# Patient Record
Sex: FEMALE | Race: WHITE | NOT HISPANIC OR LATINO | Employment: OTHER | URBAN - METROPOLITAN AREA
[De-identification: names, ages, dates, MRNs, and addresses within clinical notes are randomized per-mention and may not be internally consistent; named-entity substitution may affect disease eponyms.]

---

## 2023-05-15 ENCOUNTER — TELEPHONE (OUTPATIENT)
Dept: PRIMARY CARE | Facility: CLINIC | Age: 78
End: 2023-05-15

## 2023-06-28 PROBLEM — M25.511 CHRONIC RIGHT SHOULDER PAIN: Status: ACTIVE | Noted: 2023-06-28

## 2023-06-28 PROBLEM — E03.8 HYPOTHYROIDISM DUE TO HASHIMOTO'S THYROIDITIS: Status: ACTIVE | Noted: 2023-06-28

## 2023-06-28 PROBLEM — S46.001S OSTEOARTHRITIS OF RIGHT SHOULDER DUE TO ROTATOR CUFF INJURY: Status: ACTIVE | Noted: 2023-06-28

## 2023-06-28 PROBLEM — M67.819 TENDINOSIS OF ROTATOR CUFF: Status: ACTIVE | Noted: 2023-06-28

## 2023-06-28 PROBLEM — M54.6 DORSALGIA OF THORACIC REGION: Status: ACTIVE | Noted: 2023-06-28

## 2023-06-28 PROBLEM — M25.572 ACUTE BILATERAL ANKLE PAIN: Status: ACTIVE | Noted: 2023-06-28

## 2023-06-28 PROBLEM — K63.5 BENIGN COLONIC POLYP: Status: ACTIVE | Noted: 2023-06-28

## 2023-06-28 PROBLEM — G47.62: Status: ACTIVE | Noted: 2023-06-28

## 2023-06-28 PROBLEM — L80 VITILIGO: Status: ACTIVE | Noted: 2023-06-28

## 2023-06-28 PROBLEM — E55.9 VITAMIN D DEFICIENCY: Status: ACTIVE | Noted: 2023-06-28

## 2023-06-28 PROBLEM — N81.10 FEMALE BLADDER PROLAPSE: Status: ACTIVE | Noted: 2023-06-28

## 2023-06-28 PROBLEM — M48.062 SPINAL STENOSIS OF LUMBAR REGION WITH NEUROGENIC CLAUDICATION: Status: ACTIVE | Noted: 2023-06-28

## 2023-06-28 PROBLEM — G89.29 CHRONIC RIGHT SHOULDER PAIN: Status: ACTIVE | Noted: 2023-06-28

## 2023-06-28 PROBLEM — M25.561 RIGHT KNEE PAIN: Status: ACTIVE | Noted: 2023-06-28

## 2023-06-28 PROBLEM — I65.23 BILATERAL CAROTID ARTERY STENOSIS: Status: ACTIVE | Noted: 2023-06-28

## 2023-06-28 PROBLEM — R10.2 PELVIC PAIN IN FEMALE: Status: ACTIVE | Noted: 2023-06-28

## 2023-06-28 PROBLEM — M70.61 GREATER TROCHANTERIC BURSITIS OF RIGHT HIP: Status: ACTIVE | Noted: 2023-06-28

## 2023-06-28 PROBLEM — R26.81 GAIT INSTABILITY: Status: ACTIVE | Noted: 2023-06-28

## 2023-06-28 PROBLEM — M85.80 OSTEOPENIA: Status: ACTIVE | Noted: 2023-06-28

## 2023-06-28 PROBLEM — M19.111 OSTEOARTHRITIS OF RIGHT SHOULDER DUE TO ROTATOR CUFF INJURY: Status: ACTIVE | Noted: 2023-06-28

## 2023-06-28 PROBLEM — E53.8 VITAMIN B12 DEFICIENCY: Status: ACTIVE | Noted: 2023-06-28

## 2023-06-28 PROBLEM — E06.3 HYPOTHYROIDISM DUE TO HASHIMOTO'S THYROIDITIS: Status: ACTIVE | Noted: 2023-06-28

## 2023-06-28 PROBLEM — J44.89 COPD (CHRONIC OBSTRUCTIVE PULMONARY DISEASE) WITH CHRONIC BRONCHITIS (MULTI): Status: ACTIVE | Noted: 2023-06-28

## 2023-06-28 PROBLEM — I87.2 CHRONIC VENOUS INSUFFICIENCY OF LOWER EXTREMITY: Status: ACTIVE | Noted: 2023-06-28

## 2023-06-28 PROBLEM — N32.81 OAB (OVERACTIVE BLADDER): Status: ACTIVE | Noted: 2023-06-28

## 2023-06-28 PROBLEM — J30.89 PERENNIAL ALLERGIC RHINITIS WITH SEASONAL VARIATION: Status: ACTIVE | Noted: 2023-06-28

## 2023-06-28 PROBLEM — M53.9 MULTILEVEL DEGENERATIVE DISC DISEASE: Status: ACTIVE | Noted: 2023-06-28

## 2023-06-28 PROBLEM — J30.2 PERENNIAL ALLERGIC RHINITIS WITH SEASONAL VARIATION: Status: ACTIVE | Noted: 2023-06-28

## 2023-06-28 PROBLEM — M25.571 ACUTE BILATERAL ANKLE PAIN: Status: ACTIVE | Noted: 2023-06-28

## 2023-06-28 PROBLEM — M99.09 SEGMENTAL AND SOMATIC DYSFUNCTION: Status: ACTIVE | Noted: 2023-06-28

## 2023-06-28 PROBLEM — M12.811 ROTATOR CUFF ARTHROPATHY OF RIGHT SHOULDER: Status: ACTIVE | Noted: 2023-06-28

## 2023-06-28 PROBLEM — R73.02 IGT (IMPAIRED GLUCOSE TOLERANCE): Status: ACTIVE | Noted: 2023-06-28

## 2023-06-28 PROBLEM — M47.814 THORACIC SPONDYLOSIS: Status: ACTIVE | Noted: 2023-06-28

## 2023-06-28 PROBLEM — D89.89: Status: ACTIVE | Noted: 2023-06-28

## 2023-06-28 PROBLEM — R53.83 FATIGUE: Status: ACTIVE | Noted: 2023-06-28

## 2023-06-28 PROBLEM — M15.9 GENERALIZED OSTEOARTHRITIS OF MULTIPLE SITES: Status: ACTIVE | Noted: 2023-06-28

## 2023-06-28 PROBLEM — G93.32: Status: ACTIVE | Noted: 2023-06-28

## 2023-06-28 RX ORDER — CHOLECALCIFEROL (VITAMIN D3) 50 MCG
50 TABLET ORAL DAILY
COMMUNITY

## 2023-06-28 RX ORDER — ALBUTEROL SULFATE 90 UG/1
2 AEROSOL, METERED RESPIRATORY (INHALATION) EVERY 6 HOURS PRN
COMMUNITY
Start: 2020-06-03

## 2023-06-28 RX ORDER — ESTRADIOL 0.1 MG/G
2 CREAM VAGINAL
COMMUNITY
Start: 2023-03-08

## 2023-06-28 RX ORDER — CYANOCOBALAMIN 1000 UG/ML
1000 INJECTION, SOLUTION INTRAMUSCULAR; SUBCUTANEOUS
COMMUNITY
Start: 2022-09-19

## 2023-06-28 RX ORDER — LEVOTHYROXINE SODIUM 100 UG/1
100 TABLET ORAL DAILY
COMMUNITY
End: 2023-08-02 | Stop reason: ALTCHOICE

## 2023-06-28 RX ORDER — IBUPROFEN 200 MG
1 TABLET ORAL DAILY
COMMUNITY
Start: 2022-04-05

## 2023-06-28 RX ORDER — BUDESONIDE AND FORMOTEROL FUMARATE DIHYDRATE 160; 4.5 UG/1; UG/1
2 AEROSOL RESPIRATORY (INHALATION) 2 TIMES DAILY
COMMUNITY
Start: 2022-05-20 | End: 2023-10-31 | Stop reason: SDUPTHER

## 2023-07-05 ENCOUNTER — OFFICE VISIT (OUTPATIENT)
Dept: PRIMARY CARE | Facility: CLINIC | Age: 78
End: 2023-07-05
Payer: MEDICARE

## 2023-07-05 VITALS
HEART RATE: 78 BPM | OXYGEN SATURATION: 96 % | SYSTOLIC BLOOD PRESSURE: 118 MMHG | RESPIRATION RATE: 16 BRPM | HEIGHT: 68 IN | DIASTOLIC BLOOD PRESSURE: 70 MMHG | WEIGHT: 198 LBS | BODY MASS INDEX: 30.01 KG/M2

## 2023-07-05 DIAGNOSIS — E53.8 VITAMIN B12 DEFICIENCY: ICD-10-CM

## 2023-07-05 DIAGNOSIS — D89.89 CFIDS (CHRONIC FATIGUE AND IMMUNE DYSFUNCTION SYNDROME) (MULTI): ICD-10-CM

## 2023-07-05 DIAGNOSIS — I65.23 CAROTID STENOSIS, ASYMPTOMATIC, BILATERAL: ICD-10-CM

## 2023-07-05 DIAGNOSIS — I65.23 BILATERAL CAROTID ARTERY STENOSIS: ICD-10-CM

## 2023-07-05 DIAGNOSIS — J44.89 COPD (CHRONIC OBSTRUCTIVE PULMONARY DISEASE) WITH CHRONIC BRONCHITIS (MULTI): ICD-10-CM

## 2023-07-05 DIAGNOSIS — R73.02 IGT (IMPAIRED GLUCOSE TOLERANCE): ICD-10-CM

## 2023-07-05 DIAGNOSIS — E03.8 HYPOTHYROIDISM DUE TO HASHIMOTO'S THYROIDITIS: ICD-10-CM

## 2023-07-05 DIAGNOSIS — E06.3 HYPOTHYROIDISM DUE TO HASHIMOTO'S THYROIDITIS: ICD-10-CM

## 2023-07-05 DIAGNOSIS — G93.32 CFIDS (CHRONIC FATIGUE AND IMMUNE DYSFUNCTION SYNDROME) (MULTI): ICD-10-CM

## 2023-07-05 DIAGNOSIS — G43.801 OCULAR MIGRAINE WITH STATUS MIGRAINOSUS: Primary | ICD-10-CM

## 2023-07-05 DIAGNOSIS — M70.61 GREATER TROCHANTERIC BURSITIS OF RIGHT HIP: ICD-10-CM

## 2023-07-05 DIAGNOSIS — E55.9 VITAMIN D DEFICIENCY: ICD-10-CM

## 2023-07-05 DIAGNOSIS — E78.2 MIXED HYPERLIPIDEMIA: ICD-10-CM

## 2023-07-05 PROBLEM — G89.29 CHRONIC RIGHT SHOULDER PAIN: Status: RESOLVED | Noted: 2023-06-28 | Resolved: 2023-07-05

## 2023-07-05 PROBLEM — R26.81 GAIT INSTABILITY: Status: RESOLVED | Noted: 2023-06-28 | Resolved: 2023-07-05

## 2023-07-05 PROBLEM — M67.819 TENDINOSIS OF ROTATOR CUFF: Status: RESOLVED | Noted: 2023-06-28 | Resolved: 2023-07-05

## 2023-07-05 PROBLEM — M99.09 SEGMENTAL AND SOMATIC DYSFUNCTION: Status: RESOLVED | Noted: 2023-06-28 | Resolved: 2023-07-05

## 2023-07-05 PROBLEM — M25.571 ACUTE BILATERAL ANKLE PAIN: Status: RESOLVED | Noted: 2023-06-28 | Resolved: 2023-07-05

## 2023-07-05 PROBLEM — M25.572 ACUTE BILATERAL ANKLE PAIN: Status: RESOLVED | Noted: 2023-06-28 | Resolved: 2023-07-05

## 2023-07-05 PROBLEM — G47.62: Status: RESOLVED | Noted: 2023-06-28 | Resolved: 2023-07-05

## 2023-07-05 PROBLEM — M25.511 CHRONIC RIGHT SHOULDER PAIN: Status: RESOLVED | Noted: 2023-06-28 | Resolved: 2023-07-05

## 2023-07-05 PROBLEM — M12.811 ROTATOR CUFF ARTHROPATHY OF RIGHT SHOULDER: Status: RESOLVED | Noted: 2023-06-28 | Resolved: 2023-07-05

## 2023-07-05 PROBLEM — R10.2 PELVIC PAIN IN FEMALE: Status: RESOLVED | Noted: 2023-06-28 | Resolved: 2023-07-05

## 2023-07-05 PROBLEM — M25.561 RIGHT KNEE PAIN: Status: RESOLVED | Noted: 2023-06-28 | Resolved: 2023-07-05

## 2023-07-05 PROBLEM — R53.83 FATIGUE: Status: RESOLVED | Noted: 2023-06-28 | Resolved: 2023-07-05

## 2023-07-05 PROBLEM — M47.814 THORACIC SPONDYLOSIS: Status: RESOLVED | Noted: 2023-06-28 | Resolved: 2023-07-05

## 2023-07-05 PROCEDURE — 1160F RVW MEDS BY RX/DR IN RCRD: CPT | Performed by: INTERNAL MEDICINE

## 2023-07-05 PROCEDURE — 1036F TOBACCO NON-USER: CPT | Performed by: INTERNAL MEDICINE

## 2023-07-05 PROCEDURE — 20610 DRAIN/INJ JOINT/BURSA W/O US: CPT | Performed by: INTERNAL MEDICINE

## 2023-07-05 PROCEDURE — 1159F MED LIST DOCD IN RCRD: CPT | Performed by: INTERNAL MEDICINE

## 2023-07-05 PROCEDURE — 96372 THER/PROPH/DIAG INJ SC/IM: CPT | Performed by: INTERNAL MEDICINE

## 2023-07-05 PROCEDURE — 99214 OFFICE O/P EST MOD 30 MIN: CPT | Performed by: INTERNAL MEDICINE

## 2023-07-05 RX ORDER — CYANOCOBALAMIN 1000 UG/ML
1000 INJECTION, SOLUTION INTRAMUSCULAR; SUBCUTANEOUS ONCE
Status: COMPLETED | OUTPATIENT
Start: 2023-07-05 | End: 2023-07-05

## 2023-07-05 RX ORDER — TRIAMCINOLONE ACETONIDE 40 MG/ML
40 INJECTION, SUSPENSION INTRA-ARTICULAR; INTRAMUSCULAR
Status: COMPLETED | OUTPATIENT
Start: 2023-07-05 | End: 2023-07-05

## 2023-07-05 RX ORDER — LIDOCAINE HYDROCHLORIDE 10 MG/ML
2 INJECTION INFILTRATION; PERINEURAL
Status: COMPLETED | OUTPATIENT
Start: 2023-07-05 | End: 2023-07-05

## 2023-07-05 RX ADMIN — TRIAMCINOLONE ACETONIDE 40 MG: 40 INJECTION, SUSPENSION INTRA-ARTICULAR; INTRAMUSCULAR at 18:59

## 2023-07-05 RX ADMIN — CYANOCOBALAMIN 1000 MCG: 1000 INJECTION, SOLUTION INTRAMUSCULAR; SUBCUTANEOUS at 17:53

## 2023-07-05 RX ADMIN — LIDOCAINE HYDROCHLORIDE 2 ML: 10 INJECTION INFILTRATION; PERINEURAL at 18:59

## 2023-07-05 ASSESSMENT — ENCOUNTER SYMPTOMS
MYALGIAS: 1
LOSS OF SENSATION IN FEET: 0
OCCASIONAL FEELINGS OF UNSTEADINESS: 0
FATIGUE: 1
BACK PAIN: 1
ARTHRALGIAS: 1
HEADACHES: 1
DEPRESSION: 0

## 2023-07-05 ASSESSMENT — ANXIETY QUESTIONNAIRES
2. NOT BEING ABLE TO STOP OR CONTROL WORRYING: NOT AT ALL
5. BEING SO RESTLESS THAT IT IS HARD TO SIT STILL: NOT AT ALL
1. FEELING NERVOUS, ANXIOUS, OR ON EDGE: NOT AT ALL
4. TROUBLE RELAXING: NOT AT ALL
IF YOU CHECKED OFF ANY PROBLEMS ON THIS QUESTIONNAIRE, HOW DIFFICULT HAVE THESE PROBLEMS MADE IT FOR YOU TO DO YOUR WORK, TAKE CARE OF THINGS AT HOME, OR GET ALONG WITH OTHER PEOPLE: NOT DIFFICULT AT ALL
GAD7 TOTAL SCORE: 0
6. BECOMING EASILY ANNOYED OR IRRITABLE: NOT AT ALL
3. WORRYING TOO MUCH ABOUT DIFFERENT THINGS: NOT AT ALL
7. FEELING AFRAID AS IF SOMETHING AWFUL MIGHT HAPPEN: NOT AT ALL

## 2023-07-05 ASSESSMENT — PATIENT HEALTH QUESTIONNAIRE - PHQ9
2. FEELING DOWN, DEPRESSED OR HOPELESS: NOT AT ALL
SUM OF ALL RESPONSES TO PHQ9 QUESTIONS 1 AND 2: 0
1. LITTLE INTEREST OR PLEASURE IN DOING THINGS: NOT AT ALL

## 2023-07-05 ASSESSMENT — COLUMBIA-SUICIDE SEVERITY RATING SCALE - C-SSRS
1. IN THE PAST MONTH, HAVE YOU WISHED YOU WERE DEAD OR WISHED YOU COULD GO TO SLEEP AND NOT WAKE UP?: NO
2. HAVE YOU ACTUALLY HAD ANY THOUGHTS OF KILLING YOURSELF?: NO
6. HAVE YOU EVER DONE ANYTHING, STARTED TO DO ANYTHING, OR PREPARED TO DO ANYTHING TO END YOUR LIFE?: NO

## 2023-07-05 NOTE — PROGRESS NOTES
Subjective   Reason for Visit: Jie Crocker is an 78 y.o. female here for a fu  visit.     Past Medical, Surgical, and Family History reviewed and updated in chart.    Reviewed all medications by prescribing practitioner or clinical pharmacist (such as prescriptions, OTCs, herbal therapies and supplements) and documented in the medical record.    Presented to the office today history of vitreous detachment of her eyes bilaterally in the past has been experiencing increase in amount of floaters in addition to visual phenomenon followed by a chronic mild headache she was evaluated by her ophthalmologist who diagnosed ocular migraine there was no evidence of embolic disease but felt that she should have a vascular work-up.  Patient denies acute loss of vision diplopia she denies severe migraine headache she denies jaw claudication or temporal pain he denies any shoulder or neck pain does have chronic rotator cuff tear pathology of her right shoulder from previous falls no TIA or stroke phenomenon noted patient notes chronic back pain particularly in her thoracic spine at this time.  Patient being cared for by chiropractic physician with mild benefit patient also experiencing recurrent greater trochanteric bursitis of the right hip and would like a hip injection which she responded to well with her previous visit has follow-up appointment in 1 month for regular examination with her history of Hashimoto thyroiditis and vitiligo with like to repeat autoimmune work-up        Patient Care Team:  Magdaleno Nielson DO as PCP - General  Magdaleno Nielson DO as PCP - [facility] [place] [place]     Review of Systems   Constitutional:  Positive for fatigue.   Eyes:  Positive for visual disturbance.   Musculoskeletal:  Positive for arthralgias, back pain and myalgias.   Neurological:  Positive for headaches.   Patient ID: Jie Crocker is a 78 y.o. female.    Joint Injection Large/Arthrocentesis: R greater trochanteric  "bursa on 7/5/2023 6:59 PM  Indications: pain  Details: lateral approach  Medications: 40 mg triamcinolone acetonide 40 mg/mL; 2 mL lidocaine 10 mg/mL (1 %)  Procedure, treatment alternatives, risks and benefits explained, specific risks discussed. Immediately prior to procedure a time out was called to verify the correct patient, procedure, equipment, support staff and site/side marked as required. Patient was prepped and draped in the usual sterile fashion.           Objective   Vitals:  /70   Pulse 78   Resp 16   Ht 1.715 m (5' 7.5\")   Wt 89.8 kg (198 lb)   SpO2 96%   BMI 30.55 kg/m²       Physical Exam  Vitals and nursing note reviewed.   Constitutional:       General: She is not in acute distress.     Appearance: Normal appearance. She is well-developed. She is not toxic-appearing.   HENT:      Head: Normocephalic and atraumatic.      Right Ear: Tympanic membrane and external ear normal.      Left Ear: Tympanic membrane and external ear normal.      Nose: Nose normal.      Mouth/Throat:      Mouth: Mucous membranes are moist.      Pharynx: Oropharynx is clear. No oropharyngeal exudate or posterior oropharyngeal erythema.      Tonsils: No tonsillar exudate. 2+ on the right. 2+ on the left.   Eyes:      Extraocular Movements: Extraocular movements intact.      Conjunctiva/sclera: Conjunctivae normal.   Cardiovascular:      Rate and Rhythm: Normal rate and regular rhythm.      Pulses: Normal pulses.      Heart sounds: Normal heart sounds. No murmur heard.  Pulmonary:      Effort: Pulmonary effort is normal.      Breath sounds: Normal breath sounds.   Abdominal:      General: Abdomen is flat. Bowel sounds are normal.      Palpations: Abdomen is soft.   Musculoskeletal:      Cervical back: Neck supple.   Lymphadenopathy:      Cervical: No cervical adenopathy.   Skin:     General: Skin is warm and dry.      Findings: No rash.   Neurological:      Mental Status: She is alert. Mental status is at baseline. "   Psychiatric:         Mood and Affect: Mood normal.         Behavior: Behavior normal.         Thought Content: Thought content normal.         Judgment: Judgment normal.         Assessment/Plan   Problem List Items Addressed This Visit       Bilateral carotid artery stenosis    Current Assessment & Plan     Reevaluate carotid Dopplers in the setting of visual disturbances and ocular migraine         COPD (chronic obstructive pulmonary disease) with chronic bronchitis (CMS/[facility])    Current Assessment & Plan     Stable on bronchodilator therapy at this time         CFIDS (chronic fatigue and immune dysfunction syndrome) (CMS/[facility])    Current Assessment & Plan     Reevaluate autoimmune profile         Relevant Orders    Sedimentation Rate    MERRY with Reflex to EDITH    Rheumatoid factor    Tsh With Reflex To Free T4 If Abnormal    Comprehensive metabolic panel    CBC and Auto Differential    Vitamin D 25-Hydroxy,Total    Vitamin B12    High sensitivity CRP    Vascular US carotid artery duplex bilateral    Generalized osteoarthritis of multiple sites    Greater trochanteric bursitis of right hip    Current Assessment & Plan     Given bursal hip injection today with good results no complications reevaluate next visit         Relevant Orders    Sedimentation Rate    MERRY with Reflex to EDITH    Rheumatoid factor    Tsh With Reflex To Free T4 If Abnormal    Comprehensive metabolic panel    CBC and Auto Differential    Vitamin D 25-Hydroxy,Total    Vitamin B12    High sensitivity CRP    Vascular US carotid artery duplex bilateral    Hyperlipidemia    Current Assessment & Plan     Reevaluate cardiometabolic profile in the setting of vascular risk         Relevant Orders    Sedimentation Rate    MERRY with Reflex to EDITH    Rheumatoid factor    Tsh With Reflex To Free T4 If Abnormal    Comprehensive metabolic panel    CBC and Auto Differential    Vitamin D 25-Hydroxy,Total    Vitamin B12    High sensitivity CRP    Vascular US carotid  artery duplex bilateral    Hypothyroidism due to Hashimoto's thyroiditis    Current Assessment & Plan     Reevaluate thyroid functions continue levothyroxine 100 mcg daily clinically euthyroid in January         Relevant Orders    Sedimentation Rate    MERRY with Reflex to EDITH    Rheumatoid factor    Tsh With Reflex To Free T4 If Abnormal    Comprehensive metabolic panel    CBC and Auto Differential    Vitamin D 25-Hydroxy,Total    Vitamin B12    High sensitivity CRP    Vascular US carotid artery duplex bilateral    IGT (impaired glucose tolerance)    Current Assessment & Plan     Reevaluate fasting blood sugar and hemoglobin A1c as a relates to fatigue and polymyalgia         Vitamin B12 deficiency    Current Assessment & Plan     Repeat vitamin B12 levels for history of vitamin B12 deficiency         Relevant Medications    cyanocobalamin (Vitamin B-12) injection 1,000 mcg (Completed)    Vitamin D deficiency    Current Assessment & Plan     Repeat vitamin D level with vitamin D supplementation         Relevant Orders    Sedimentation Rate    MERRY with Reflex to EDITH    Rheumatoid factor    Tsh With Reflex To Free T4 If Abnormal    Comprehensive metabolic panel    CBC and Auto Differential    Vitamin D 25-Hydroxy,Total    Vitamin B12    High sensitivity CRP    Vascular US carotid artery duplex bilateral    Ocular migraine with status migrainosus    Overview     Patient have more frequent episodes of starburst followed by headache may be related to rebound headache from NSAID overuse for treatment of osteoarthritis multifactorial history of bilateral vitreous detachment no evidence of retinal detachment continue to monitor closely neurological exam is nonfocal and does not suggest intracranial pathology May consider CT scan of head if symptoms continue or acutely worsen we will check sed rate and CRP levels to evaluate for possibility of temporal arteritis based on age and history of autoimmune condition with  polymyalgia rheumatica          Other Visit Diagnoses       Ocular migraine    -  Primary    Relevant Orders    Sedimentation Rate    MERRY with Reflex to EDITH    Rheumatoid factor    Tsh With Reflex To Free T4 If Abnormal    Comprehensive metabolic panel    CBC and Auto Differential    Vitamin D 25-Hydroxy,Total    Vitamin B12    High sensitivity CRP    Vascular US carotid artery duplex bilateral    Carotid stenosis, asymptomatic, bilateral        Relevant Orders    Vascular US carotid artery duplex bilateral

## 2023-07-24 ENCOUNTER — LAB (OUTPATIENT)
Dept: LAB | Facility: LAB | Age: 78
End: 2023-07-24
Payer: MEDICARE

## 2023-07-24 DIAGNOSIS — M70.61 GREATER TROCHANTERIC BURSITIS OF RIGHT HIP: ICD-10-CM

## 2023-07-24 DIAGNOSIS — D89.89 CFIDS (CHRONIC FATIGUE AND IMMUNE DYSFUNCTION SYNDROME) (MULTI): ICD-10-CM

## 2023-07-24 DIAGNOSIS — E03.8 HYPOTHYROIDISM DUE TO HASHIMOTO'S THYROIDITIS: ICD-10-CM

## 2023-07-24 DIAGNOSIS — E78.2 MIXED HYPERLIPIDEMIA: ICD-10-CM

## 2023-07-24 DIAGNOSIS — E55.9 VITAMIN D DEFICIENCY: ICD-10-CM

## 2023-07-24 DIAGNOSIS — E06.3 HYPOTHYROIDISM DUE TO HASHIMOTO'S THYROIDITIS: ICD-10-CM

## 2023-07-24 DIAGNOSIS — G93.32 CFIDS (CHRONIC FATIGUE AND IMMUNE DYSFUNCTION SYNDROME) (MULTI): ICD-10-CM

## 2023-07-24 LAB
ALANINE AMINOTRANSFERASE (SGPT) (U/L) IN SER/PLAS: 21 U/L (ref 7–45)
ALBUMIN (G/DL) IN SER/PLAS: 3.9 G/DL (ref 3.4–5)
ALKALINE PHOSPHATASE (U/L) IN SER/PLAS: 75 U/L (ref 33–136)
ANION GAP IN SER/PLAS: 9 MMOL/L (ref 10–20)
ASPARTATE AMINOTRANSFERASE (SGOT) (U/L) IN SER/PLAS: 19 U/L (ref 9–39)
BASOPHILS (10*3/UL) IN BLOOD BY AUTOMATED COUNT: 0.07 X10E9/L (ref 0–0.1)
BASOPHILS/100 LEUKOCYTES IN BLOOD BY AUTOMATED COUNT: 1.1 % (ref 0–2)
BILIRUBIN TOTAL (MG/DL) IN SER/PLAS: 0.5 MG/DL (ref 0–1.2)
C REACTIVE PROTEIN (MG/L) IN SER/PLAS BY HIGH SENSIT: 3.1 MG/L
CALCIDIOL (25 OH VITAMIN D3) (NG/ML) IN SER/PLAS: 46 NG/ML
CALCIUM (MG/DL) IN SER/PLAS: 9.2 MG/DL (ref 8.6–10.3)
CARBON DIOXIDE, TOTAL (MMOL/L) IN SER/PLAS: 26 MMOL/L (ref 21–32)
CHLORIDE (MMOL/L) IN SER/PLAS: 109 MMOL/L (ref 98–107)
COBALAMIN (VITAMIN B12) (PG/ML) IN SER/PLAS: 1060 PG/ML (ref 211–911)
CREATININE (MG/DL) IN SER/PLAS: 0.77 MG/DL (ref 0.5–1.05)
EOSINOPHILS (10*3/UL) IN BLOOD BY AUTOMATED COUNT: 0.21 X10E9/L (ref 0–0.4)
EOSINOPHILS/100 LEUKOCYTES IN BLOOD BY AUTOMATED COUNT: 3.4 % (ref 0–6)
ERYTHROCYTE DISTRIBUTION WIDTH (RATIO) BY AUTOMATED COUNT: 12.8 % (ref 11.5–14.5)
ERYTHROCYTE MEAN CORPUSCULAR HEMOGLOBIN CONCENTRATION (G/DL) BY AUTOMATED: 31.8 G/DL (ref 32–36)
ERYTHROCYTE MEAN CORPUSCULAR VOLUME (FL) BY AUTOMATED COUNT: 91 FL (ref 80–100)
ERYTHROCYTES (10*6/UL) IN BLOOD BY AUTOMATED COUNT: 4.9 X10E12/L (ref 4–5.2)
GFR FEMALE: 80 ML/MIN/1.73M2
GLUCOSE (MG/DL) IN SER/PLAS: 81 MG/DL (ref 74–99)
HEMATOCRIT (%) IN BLOOD BY AUTOMATED COUNT: 44.4 % (ref 36–46)
HEMOGLOBIN (G/DL) IN BLOOD: 14.1 G/DL (ref 12–16)
IMMATURE GRANULOCYTES/100 LEUKOCYTES IN BLOOD BY AUTOMATED COUNT: 0.2 % (ref 0–0.9)
LEUKOCYTES (10*3/UL) IN BLOOD BY AUTOMATED COUNT: 6.1 X10E9/L (ref 4.4–11.3)
LYMPHOCYTES (10*3/UL) IN BLOOD BY AUTOMATED COUNT: 2.5 X10E9/L (ref 0.8–3)
LYMPHOCYTES/100 LEUKOCYTES IN BLOOD BY AUTOMATED COUNT: 41 % (ref 13–44)
MONOCYTES (10*3/UL) IN BLOOD BY AUTOMATED COUNT: 0.56 X10E9/L (ref 0.05–0.8)
MONOCYTES/100 LEUKOCYTES IN BLOOD BY AUTOMATED COUNT: 9.2 % (ref 2–10)
NEUTROPHILS (10*3/UL) IN BLOOD BY AUTOMATED COUNT: 2.75 X10E9/L (ref 1.6–5.5)
NEUTROPHILS/100 LEUKOCYTES IN BLOOD BY AUTOMATED COUNT: 45.1 % (ref 40–80)
PLATELETS (10*3/UL) IN BLOOD AUTOMATED COUNT: 219 X10E9/L (ref 150–450)
POTASSIUM (MMOL/L) IN SER/PLAS: 4.4 MMOL/L (ref 3.5–5.3)
PROTEIN TOTAL: 6.1 G/DL (ref 6.4–8.2)
RHEUMATOID FACTOR (IU/ML) IN SERUM OR PLASMA: <10 IU/ML (ref 0–15)
SEDIMENTATION RATE, ERYTHROCYTE: 14 MM/H (ref 0–30)
SODIUM (MMOL/L) IN SER/PLAS: 140 MMOL/L (ref 136–145)
THYROTROPIN (MIU/L) IN SER/PLAS BY DETECTION LIMIT <= 0.05 MIU/L: 2.95 MIU/L (ref 0.44–3.98)
UREA NITROGEN (MG/DL) IN SER/PLAS: 14 MG/DL (ref 6–23)

## 2023-07-24 PROCEDURE — 36415 COLL VENOUS BLD VENIPUNCTURE: CPT

## 2023-07-24 PROCEDURE — 86431 RHEUMATOID FACTOR QUANT: CPT

## 2023-07-24 PROCEDURE — 85652 RBC SED RATE AUTOMATED: CPT

## 2023-07-24 PROCEDURE — 84443 ASSAY THYROID STIM HORMONE: CPT

## 2023-07-24 PROCEDURE — 85025 COMPLETE CBC W/AUTO DIFF WBC: CPT

## 2023-07-24 PROCEDURE — 80053 COMPREHEN METABOLIC PANEL: CPT

## 2023-07-24 PROCEDURE — 86038 ANTINUCLEAR ANTIBODIES: CPT

## 2023-07-24 PROCEDURE — 82306 VITAMIN D 25 HYDROXY: CPT

## 2023-07-24 PROCEDURE — 86141 C-REACTIVE PROTEIN HS: CPT

## 2023-07-24 PROCEDURE — 82607 VITAMIN B-12: CPT

## 2023-07-25 LAB — ANTI-NUCLEAR ANTIBODY (ANA): NEGATIVE

## 2023-07-31 ENCOUNTER — OFFICE VISIT (OUTPATIENT)
Dept: PRIMARY CARE | Facility: CLINIC | Age: 78
End: 2023-07-31
Payer: MEDICARE

## 2023-07-31 VITALS
HEIGHT: 68 IN | HEART RATE: 68 BPM | SYSTOLIC BLOOD PRESSURE: 112 MMHG | BODY MASS INDEX: 29.25 KG/M2 | DIASTOLIC BLOOD PRESSURE: 70 MMHG | WEIGHT: 193 LBS

## 2023-07-31 DIAGNOSIS — E03.8 HYPOTHYROIDISM DUE TO HASHIMOTO'S THYROIDITIS: ICD-10-CM

## 2023-07-31 DIAGNOSIS — E53.8 VITAMIN B12 DEFICIENCY: ICD-10-CM

## 2023-07-31 DIAGNOSIS — I73.9 PAD (PERIPHERAL ARTERY DISEASE) (CMS-HCC): ICD-10-CM

## 2023-07-31 DIAGNOSIS — I65.23 BILATERAL CAROTID ARTERY STENOSIS: ICD-10-CM

## 2023-07-31 DIAGNOSIS — E06.3 HYPOTHYROIDISM DUE TO HASHIMOTO'S THYROIDITIS: ICD-10-CM

## 2023-07-31 DIAGNOSIS — I87.2 CHRONIC VENOUS INSUFFICIENCY OF LOWER EXTREMITY: Primary | ICD-10-CM

## 2023-07-31 DIAGNOSIS — G93.32 CFIDS (CHRONIC FATIGUE AND IMMUNE DYSFUNCTION SYNDROME) (MULTI): ICD-10-CM

## 2023-07-31 DIAGNOSIS — I87.2 EDEMA OF LEFT LOWER LEG DUE TO PERIPHERAL VENOUS INSUFFICIENCY: ICD-10-CM

## 2023-07-31 DIAGNOSIS — D89.89 CFIDS (CHRONIC FATIGUE AND IMMUNE DYSFUNCTION SYNDROME) (MULTI): ICD-10-CM

## 2023-07-31 DIAGNOSIS — R60.0 EDEMA OF LEFT LOWER LEG DUE TO PERIPHERAL VENOUS INSUFFICIENCY: ICD-10-CM

## 2023-07-31 PROCEDURE — 99213 OFFICE O/P EST LOW 20 MIN: CPT | Performed by: INTERNAL MEDICINE

## 2023-07-31 PROCEDURE — 1036F TOBACCO NON-USER: CPT | Performed by: INTERNAL MEDICINE

## 2023-07-31 PROCEDURE — 1159F MED LIST DOCD IN RCRD: CPT | Performed by: INTERNAL MEDICINE

## 2023-07-31 PROCEDURE — 1160F RVW MEDS BY RX/DR IN RCRD: CPT | Performed by: INTERNAL MEDICINE

## 2023-07-31 PROCEDURE — 96372 THER/PROPH/DIAG INJ SC/IM: CPT | Performed by: INTERNAL MEDICINE

## 2023-07-31 RX ORDER — CYANOCOBALAMIN 1000 UG/ML
1000 INJECTION, SOLUTION INTRAMUSCULAR; SUBCUTANEOUS ONCE
Status: COMPLETED | OUTPATIENT
Start: 2023-07-31 | End: 2023-07-31

## 2023-07-31 RX ADMIN — CYANOCOBALAMIN 1000 MCG: 1000 INJECTION, SOLUTION INTRAMUSCULAR; SUBCUTANEOUS at 12:18

## 2023-07-31 ASSESSMENT — ENCOUNTER SYMPTOMS
ARTHRALGIAS: 1
BACK PAIN: 1
FATIGUE: 1
MYALGIAS: 1

## 2023-07-31 NOTE — PROGRESS NOTES
"Subjective   Reason for Visit: Jie Crocker is an 78 y.o. female here for a fu  visit.     Past Medical, Surgical, and Family History reviewed and updated in chart.         HPI    Patient Care Team:  Magdaleno Nielson DO as PCP - General  Magdaleno Nielson DO as PCP - [facility] Attributed Provider     Review of Systems   Constitutional:  Positive for fatigue.   Musculoskeletal:  Positive for arthralgias, back pain and myalgias.       Objective   Vitals:  /70 (BP Location: Other (Comment), Patient Position: Sitting) Comment (BP Location): Right Forearm  Pulse 68   Ht 1.715 m (5' 7.5\")   Wt 87.5 kg (193 lb)   BMI 29.78 kg/m²       Physical Exam  Vitals and nursing note reviewed.   Constitutional:       General: She is not in acute distress.     Appearance: Normal appearance. She is well-developed. She is not toxic-appearing.   HENT:      Head: Normocephalic and atraumatic.      Right Ear: Tympanic membrane and external ear normal.      Left Ear: Tympanic membrane and external ear normal.      Nose: Nose normal.      Mouth/Throat:      Mouth: Mucous membranes are moist.      Pharynx: Oropharynx is clear. No oropharyngeal exudate or posterior oropharyngeal erythema.      Tonsils: No tonsillar exudate. 2+ on the right. 2+ on the left.   Eyes:      Extraocular Movements: Extraocular movements intact.      Conjunctiva/sclera: Conjunctivae normal.   Cardiovascular:      Rate and Rhythm: Normal rate and regular rhythm.      Pulses: Normal pulses.      Heart sounds: Normal heart sounds. No murmur heard.  Pulmonary:      Effort: Pulmonary effort is normal.      Breath sounds: Normal breath sounds.   Abdominal:      General: Abdomen is flat. Bowel sounds are normal.      Palpations: Abdomen is soft.   Musculoskeletal:      Cervical back: Neck supple.   Lymphadenopathy:      Cervical: No cervical adenopathy.   Skin:     General: Skin is warm and dry.      Findings: No rash.   Neurological:      Mental Status: She " is alert. Mental status is at baseline.   Psychiatric:         Mood and Affect: Mood normal.         Behavior: Behavior normal.         Thought Content: Thought content normal.         Judgment: Judgment normal.         Assessment/Plan   Problem List Items Addressed This Visit       Bilateral carotid artery stenosis    Current Assessment & Plan     Reevaluation of carotid arteries reveals minimal disease less than 50% continue with annual screening         Chronic venous insufficiency of lower extremity - Primary    Current Assessment & Plan     Patient experiencing worsening nocturnal cramping of the lower legs with venous insufficiency in addition to knee-high compression stockings which may help referral to vascular surgery for further evaluation         Relevant Orders    Vascular US lower extremity arterial duplex graft bilateral with DEJON    Vascular US lower extremity venous insufficiency bilateral    Follow Up In [facility] - PCP - Established    CFIDS (chronic fatigue and immune dysfunction syndrome) (CMS/[facility])    Current Assessment & Plan     Consider referral to rheumatologist recent blood work revealed no evidence of autoimmune pathology or explanation for arthralgias myalgias other than treatment of fibromyalgia discomfort with         Hypothyroidism due to Hashimoto's thyroiditis    Current Assessment & Plan     Clinically euthyroid TSH at 2.95.  Higher doses in the past has resulted in subclinical hyperthyroidism thyroid levels appropriate at this time and not related to her symptoms we will continue to follow on a 6-month basis no change in therapy         Vitamin B12 deficiency     Other Visit Diagnoses       PAD (peripheral artery disease) (CMS/HCC)        Relevant Orders    Vascular US lower extremity arterial duplex graft bilateral with DEJON    Follow Up In [facility] - PCP - Established    Edema of left lower leg due to peripheral venous insufficiency        Relevant Orders     Vascular US lower extremity venous insufficiency bilateral    Follow Up In [facility] - PCP - Established

## 2023-07-31 NOTE — ASSESSMENT & PLAN NOTE
Patient experiencing worsening nocturnal cramping of the lower legs with venous insufficiency in addition to knee-high compression stockings which may help referral to vascular surgery for further evaluation

## 2023-07-31 NOTE — ASSESSMENT & PLAN NOTE
Reevaluation of carotid arteries reveals minimal disease less than 50% continue with annual screening

## 2023-07-31 NOTE — ASSESSMENT & PLAN NOTE
Consider referral to rheumatologist recent blood work revealed no evidence of autoimmune pathology or explanation for arthralgias myalgias other than treatment of fibromyalgia discomfort with

## 2023-07-31 NOTE — PROGRESS NOTES
"Subjective   Patient ID: Jie Crocker is a 78 y.o. female who presents for Follow-up, Hip Pain, and Leg Cramps.    HPI     Review of Systems    Objective   /70 (BP Location: Other (Comment), Patient Position: Sitting) Comment (BP Location): Right Forearm  Pulse 68   Ht 1.715 m (5' 7.5\")   Wt 87.5 kg (193 lb)   BMI 29.78 kg/m²     Physical Exam    Assessment/Plan          "

## 2023-08-02 RX ORDER — LEVOTHYROXINE SODIUM 112 UG/1
112 TABLET ORAL DAILY
Qty: 30 TABLET | Refills: 11 | Status: SHIPPED | OUTPATIENT
Start: 2023-08-02 | End: 2024-08-01

## 2023-08-02 NOTE — ASSESSMENT & PLAN NOTE
Clinically euthyroid TSH at 2.95.   Patient exhibiting mild cognitive deficits with memory and ongoing fatigue we will titrate levothyroxine to 112 mcg daily and reevaluate with next blood work

## 2023-09-12 ENCOUNTER — TELEPHONE (OUTPATIENT)
Dept: PRIMARY CARE | Facility: CLINIC | Age: 78
End: 2023-09-12
Payer: MEDICARE

## 2023-09-12 DIAGNOSIS — I87.2 CHRONIC VENOUS INSUFFICIENCY OF LOWER EXTREMITY: Primary | ICD-10-CM

## 2023-09-12 NOTE — TELEPHONE ENCOUNTER
Per dr. Nielson    Arterial Dopplers of the lower legs reveals no evidence of peripheral arterial disease with excellent flow of the arteries of both legs

## 2023-09-13 ENCOUNTER — TELEPHONE (OUTPATIENT)
Dept: PRIMARY CARE | Facility: CLINIC | Age: 78
End: 2023-09-13
Payer: MEDICARE

## 2023-09-13 NOTE — TELEPHONE ENCOUNTER
Per Dr. Nielson    Venous reflux ultrasound suggests there is significant reflux in the upper thighs that may be amenable to stent we will make referral to Dr. Dez Kirkland of vascular surgery for venous stenting to improve overall venous flow of the lower legs

## 2023-10-02 ENCOUNTER — APPOINTMENT (OUTPATIENT)
Dept: PHYSICAL THERAPY | Facility: CLINIC | Age: 78
End: 2023-10-02
Payer: MEDICARE

## 2023-10-03 ENCOUNTER — CLINICAL SUPPORT (OUTPATIENT)
Dept: UROLOGY | Facility: CLINIC | Age: 78
End: 2023-10-03
Payer: MEDICARE

## 2023-10-03 ENCOUNTER — OFFICE VISIT (OUTPATIENT)
Dept: UROLOGY | Facility: CLINIC | Age: 78
End: 2023-10-03
Payer: MEDICARE

## 2023-10-03 ENCOUNTER — PROCEDURE VISIT (OUTPATIENT)
Dept: UROLOGY | Facility: CLINIC | Age: 78
End: 2023-10-03
Payer: MEDICARE

## 2023-10-03 DIAGNOSIS — N32.81 OAB (OVERACTIVE BLADDER): Primary | ICD-10-CM

## 2023-10-03 DIAGNOSIS — N81.9 FEMALE GENITAL PROLAPSE, UNSPECIFIED TYPE: ICD-10-CM

## 2023-10-03 DIAGNOSIS — N39.3 SUI (STRESS URINARY INCONTINENCE, FEMALE): ICD-10-CM

## 2023-10-03 PROCEDURE — 51728 CYSTOMETROGRAM W/VP: CPT | Performed by: STUDENT IN AN ORGANIZED HEALTH CARE EDUCATION/TRAINING PROGRAM

## 2023-10-03 PROCEDURE — 99214 OFFICE O/P EST MOD 30 MIN: CPT | Performed by: STUDENT IN AN ORGANIZED HEALTH CARE EDUCATION/TRAINING PROGRAM

## 2023-10-03 PROCEDURE — 1036F TOBACCO NON-USER: CPT | Performed by: STUDENT IN AN ORGANIZED HEALTH CARE EDUCATION/TRAINING PROGRAM

## 2023-10-03 PROCEDURE — 51741 ELECTRO-UROFLOWMETRY FIRST: CPT | Performed by: STUDENT IN AN ORGANIZED HEALTH CARE EDUCATION/TRAINING PROGRAM

## 2023-10-03 PROCEDURE — 51784 ANAL/URINARY MUSCLE STUDY: CPT | Performed by: STUDENT IN AN ORGANIZED HEALTH CARE EDUCATION/TRAINING PROGRAM

## 2023-10-03 PROCEDURE — 51797 INTRAABDOMINAL PRESSURE TEST: CPT | Performed by: STUDENT IN AN ORGANIZED HEALTH CARE EDUCATION/TRAINING PROGRAM

## 2023-10-03 PROCEDURE — 1159F MED LIST DOCD IN RCRD: CPT | Performed by: STUDENT IN AN ORGANIZED HEALTH CARE EDUCATION/TRAINING PROGRAM

## 2023-10-03 PROCEDURE — 1160F RVW MEDS BY RX/DR IN RCRD: CPT | Performed by: STUDENT IN AN ORGANIZED HEALTH CARE EDUCATION/TRAINING PROGRAM

## 2023-10-03 RX ORDER — TAMSULOSIN HYDROCHLORIDE 0.4 MG/1
CAPSULE ORAL
Qty: 10 CAPSULE | Refills: 0 | Status: SHIPPED | OUTPATIENT
Start: 2023-10-03 | End: 2024-04-30 | Stop reason: WASHOUT

## 2023-10-03 NOTE — PROGRESS NOTES
HPI:  Jie is a 79 y/o female presenting today for a follow up visit with UDS test results. Patient's UDS test results showed she does not leak and will not need a bladder sling along with her surgery. Surgery is scheduled for December 13th.       A/P:    78 year old patient referred from urge incontinence and stage II vaginal probe     POP:  Doing well with #4 ring with support  erosion improved  start estradiol cream   f/up in 6 months  will consider surgery   Surgery is scheduled for December 13th.   Will prescribe Flomax   Follow up in 6 weeks after surgery       MEGAN:  developed MEGAN with pessary  Status post bulking 4 weeks ago with not significant improvement         OAB:  improved with pessary

## 2023-10-03 NOTE — PROGRESS NOTES
Urodynamic Study:   Jie Crocker Identified using 2 forms of identification. A timeout was completed, patient is in the correct position and all safety precautions have been taken.   Risks, Benefits and Alternatives:  Risks, benefits and alternatives were discussed in detail. The patient appears to understand and agrees to proceed.  Jie Crocker has completed, signed and dated the procedure consent form.    Uroflow completed.  Using sterile technique, a 7fr Air Charge Catheter was inserted into the bladder. Rectal catheter inserted approximately 15cm  Bladder filled with normal saline at a rate of 49.9ml/min  775.9 ml of normal saline instilled in bladder prior to voiding.   Results:  Post void residual  (PVR)  volume of   150  ml.      Patient here for urodynamic study. Discussed procedure.  Bladder diary reviewed. Performed UDS without difficulty. Patient complains of leaks with urgency not so much stress. She had pessary in during study. Reduction was not done. Instructed patient to increase fluid intake.

## 2023-10-03 NOTE — PROGRESS NOTES
Urodynamic Study:   Jie Crocker   Identified using 2 forms of identification. A timeout was completed, patient is in the correct position and all safety precautions have been taken.   Risks, Benefits and Alternatives:  Risks, benefits and alternatives were discussed in detail. The patient appears to understand and agrees to proceed.  Jie Crocker  has completed, signed and dated the procedure consent form.    Uroflow was not completed.  Using sterile technique, a 7fr Air Charge Catheter was inserted into the bladder. Rectal catheter inserted approximately 15cm  Bladder filled with normal saline at a rate of 49.9ml/min   775.9 ml of normal saline instilled in bladder prior to voiding.   Results:  Post void residual  (PVR)  volume of  150  ml.      Patient here for urodynamic study. Discussed procedure. Bladder diary reviewed. Performed UDS without difficulty. Patient states that she has leaks more so with urgency and not so much with stress. She is wearing a pessary today. Prolapse reduction was not done. Instructed patient to increase fluid intake.

## 2023-10-04 ENCOUNTER — TREATMENT (OUTPATIENT)
Dept: PHYSICAL THERAPY | Facility: CLINIC | Age: 78
End: 2023-10-04
Payer: MEDICARE

## 2023-10-04 DIAGNOSIS — R25.2 CRAMP OF LIMB: ICD-10-CM

## 2023-10-04 DIAGNOSIS — R26.81 GAIT INSTABILITY: ICD-10-CM

## 2023-10-04 DIAGNOSIS — M54.6 DORSALGIA OF THORACIC REGION: Primary | ICD-10-CM

## 2023-10-04 PROCEDURE — 97110 THERAPEUTIC EXERCISES: CPT | Mod: GP | Performed by: PHYSICAL THERAPIST

## 2023-10-04 ASSESSMENT — PATIENT HEALTH QUESTIONNAIRE - PHQ9
1. LITTLE INTEREST OR PLEASURE IN DOING THINGS: NOT AT ALL
SUM OF ALL RESPONSES TO PHQ9 QUESTIONS 1 AND 2: 0
2. FEELING DOWN, DEPRESSED OR HOPELESS: NOT AT ALL

## 2023-10-04 ASSESSMENT — ENCOUNTER SYMPTOMS
DEPRESSION: 0
LOSS OF SENSATION IN FEET: 0
OCCASIONAL FEELINGS OF UNSTEADINESS: 0

## 2023-10-04 NOTE — PROGRESS NOTES
"  Physical Therapy Treatment    Patient Name: Jie Crocker  MRN: [id number]  Today's Date: 10/4/2023    PRECAUTIONS   S/p L) TKR  SUBJECTIVE:  78 y.o. Female returns for thoracic pain, LE cramping and gait instability.  Back pain today 2-3/10.  W-7-8/10 over past week.    OBJECTIVE:  Kyphotic sitting posture  Strength: hip flexors 4/5, hip ABD L) 3/5, R) 3-/5  Hip EXT 3+/5  (+) compensatory trunk sway with gait   Abdominal strength 3-/5  Trunk ROM is WFL w/mild end range limits throughout and mild end range pain, (-) repeated movements for pain provocation    TREATMENT:  - Therex:  Therapeutic Exercise  Therapeutic Exercise Activity 1: Seated T-ROT x15 ea  Therapeutic Exercise Activity 2: seated T-EXT 3x10  Therapeutic Exercise Activity 3: T-L HL ROT 10\"x5  Therapeutic Exercise Activity 4: Dead bug #2 3x10  Therapeutic Exercise Activity 5: TB HL hip ABD G 5\"x10x2  Therapeutic Exercise Activity 6: seated trunk SB x10ea.  Therapeutic Exercise Activity 7: bend over hip EXT Y 2 x10 ea.  Therapeutic Exercise Activity 8: Incline sit holds 10\"x10x2  Therapeutic Exercise Activity 9: Fig 4 Hip cross body S 10:x 5 ea.  Therapeutic Exercise Activity 10: wall Calf S 30\"x2  Therapeutic Exercise Activity 11: wall Soleus S 30\"x2    Reviewed neutral spine sitting using lumbar roll again today.    ASSESSMENT:  Pt. Continues to have pain that is minimally changed throughout body.  Tolerated ex's with changes as noted in flow sheet.     PLAN:    Continue HEP and try to attend PT more consistently.        "

## 2023-10-04 NOTE — PROGRESS NOTES
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), Neuralgia and neuritis, unspecified (08/05/2020), Obesity, unspecified (02/13/2020), Other fecal abnormalities (11/15/2021), Other low back pain (07/05/2022), Other shoulder lesions, right shoulder (02/13/2020), Other symptoms and signs involving the musculoskeletal system (12/13/2021), Other symptoms and signs involving the musculoskeletal system (10/27/2021), Other thyrotoxicosis without thyrotoxic crisis or storm (01/11/2021), Pain in left elbow (09/16/2021), Pain in left knee (12/13/2021), Pain in right finger(s) (01/03/2017), Pain in right hip (01/03/2017), Pain in thoracic spine (08/05/2020), Pain in thoracic spine (08/05/2020), Personal history of diseases of the blood and blood-forming organs and certain disorders involving the immune mechanism (02/10/2022), Personal history of other diseases of the musculoskeletal system and connective tissue (03/08/2022), Personal history of other diseases of the musculoskeletal system and connective tissue (07/25/2022), Personal history of other diseases of the musculoskeletal system and connective tissue, Personal history of other diseases of the respiratory system, Personal history of other endocrine, nutritional and metabolic disease (09/03/2020), Personal history of other endocrine, nutritional and metabolic disease (07/10/2021), Personal history of other endocrine,  "nutritional and metabolic disease (08/31/2020), Personal history of other endocrine, nutritional and metabolic disease, Personal history of other specified conditions (07/05/2022), Personal history of other specified conditions (06/24/2017), Radiculopathy, cervical region (11/08/2021), Repeated falls (07/05/2022), 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    Surgical History  She has a past surgical history that includes Other surgical history (09/03/2020); Other surgical history (09/03/2020); Other surgical history (09/03/2020); Other surgical history (09/03/2020); Other surgical history (09/03/2020); and Other surgical history (11/13/2019).     Social History  She reports that she has never smoked. She has never used smokeless tobacco. She reports that she does not currently use alcohol. She reports that she does not use drugs.    Family History  Family History   Problem Relation Name Age of Onset    Heart attack Other      Dementia Other          Allergies  Mold, Corticosteroids (glucocorticoids), Ketamine, Pollen extracts, Propoxyphene-acetaminophen, Adhesive tape-silicones, and Piroxicam      A comprehensive 10+ review of systems was negative except for: see hpi                          PHYSICAL EXAMINATION:  BP Readings from Last 3 Encounters:   07/31/23 112/70   07/05/23 118/70   03/07/23 142/76      Wt Readings from Last 3 Encounters:   07/31/23 87.5 kg (193 lb)   07/05/23 89.8 kg (198 lb)   05/18/23 90.7 kg (200 lb)      BMI: Estimated body mass index is 29.78 kg/m² as calculated from the following:    Height as of 7/31/23: 1.715 m (5' 7.5\").    Weight as of 7/31/23: 87.5 kg (193 lb).  BSA: Estimated body surface area is 2.04 meters squared as calculated from the " "following:    Height as of 7/31/23: 1.715 m (5' 7.5\").    Weight as of 7/31/23: 87.5 kg (193 lb).  HEENT: Normocephalic, atraumatic, PER EOMI, nonicteric, trachea normal, thyroid normal, oropharynx normal.  CARDIAC: regular rate & rhythm, S1 & S2 normal.  No heaves, thrills, gallops or murmurs.  LUNGS: Clear to auscultation, no spinal or CV tenderness.  ABDOMEN: flat, negative hepatosplenomegaly, soft and non-tender.  EXTREMITIES: No evidence of cyanosis, clubbing or edema.               Assessment:    78 year old patient referred from urge incontinence and stage II vaginal probe    POP:  Doing well with #4 ring with support  erosion improved  continue estradiol cream   wants to proceed with laparoscopic sacrocolpopexy       MEGAN:  developed MEGAN with pessary  Status post bulking 11/22, no improvement  UDS negative, no sling indicated     OAB:  improved with pessary        Bernardino Maya MD                   Assessment:    Bernardino Maya MD      "

## 2023-10-05 ENCOUNTER — ALLIED HEALTH (OUTPATIENT)
Dept: INTEGRATIVE MEDICINE | Facility: CLINIC | Age: 78
End: 2023-10-05
Payer: MEDICARE

## 2023-10-05 DIAGNOSIS — M54.59 OTHER LOW BACK PAIN: Primary | ICD-10-CM

## 2023-10-05 PROCEDURE — 97810 ACUP 1/> WO ESTIM 1ST 15 MIN: CPT | Performed by: ACUPUNCTURIST

## 2023-10-05 PROCEDURE — 97811 ACUP 1/> W/O ESTIM EA ADD 15: CPT | Performed by: ACUPUNCTURIST

## 2023-10-05 ASSESSMENT — ENCOUNTER SYMPTOMS: BACK PAIN: 1

## 2023-10-05 NOTE — PROGRESS NOTES
Acupuncture Visit:     Subjective   Patient ID: Jie Crocker is a 78 y.o. female who presents for Back Pain (Chronic low//)  No change in pain.  States mid back pain always more painful than low back.  Mid back pain more during the day.  States R hip pain seems to radiate from low back area.  Notes RIGHT hip is more painful at night.      Back Pain        Session Information  Is this acupuncture treatment being billed to the patient's insurance company: Yes  This is visit number: 2  The patient has a total number of visits of: 12  Initial Acupuncture Treatment date: 09/13/23  Last Treatment date: 12/13/23  Name of Insurance Company: Medicare A  + B  Name of Supervising Physician: DELBERT  Visit Type: Follow-up visit         Review of Systems   Musculoskeletal:  Positive for back pain.            Provider reviewed plan for the acupuncture session, precautions and contraindications. Patient/guardian/hospital staff has given consent to treat with full understanding of what to expect during the session. Before acupuncture began, provider explained to the patient to communicate at any time if the procedure was causing discomfort past their tolerance level. Patient agreed to advise acupuncturist. The acupuncturist counseled the patient on the risks of acupuncture treatment including pain, infection, bleeding, and no relief of pain. The patient was positioned comfortably. There was no evidence of infection at the site of needle insertions.    Objective   Physical Exam              Acupuncture Treatment  Patient Position: Supine on a table  Acupuncture Needling: Yes  Body Points: With retention  Body Points - Left: 22.01, Lv 5-6, 22.06  Body Points - Bilateral: Du 20, SP 6, UB 57-58, Kailey 5(cramps)  Body Points - Right: LGDB  Auricular Points: No  Electroacupuncture Used: No  Other Techniques Utilized: TDP Lamp  TDP Lamp Descripton: feet  Needle Count In: 12  Needle Count Out: 12  Total Face to Face Time (min): 35  "minutes    Acupuncture Evaluation / Recommendation(s) / Follow-up  Patient Comments: Pt states abdominal \"rumbling\" 5 min post insertion. States she also had a bowel movmt 3 hrs ago, magnesium supp to help bowels move last night and no breakfast this morning.         Assessment/Plan       "

## 2023-10-17 ENCOUNTER — APPOINTMENT (OUTPATIENT)
Dept: PHYSICAL THERAPY | Facility: CLINIC | Age: 78
End: 2023-10-17
Payer: MEDICARE

## 2023-10-20 ENCOUNTER — TELEPHONE (OUTPATIENT)
Dept: PRIMARY CARE | Facility: CLINIC | Age: 78
End: 2023-10-20
Payer: MEDICARE

## 2023-10-20 DIAGNOSIS — R05.1 ACUTE COUGH: Primary | ICD-10-CM

## 2023-10-20 RX ORDER — BROMPHENIRAMINE MALEATE, PSEUDOEPHEDRINE HYDROCHLORIDE, AND DEXTROMETHORPHAN HYDROBROMIDE 2; 30; 10 MG/5ML; MG/5ML; MG/5ML
10 SYRUP ORAL 4 TIMES DAILY PRN
Qty: 240 ML | Refills: 0 | Status: SHIPPED | OUTPATIENT
Start: 2023-10-20 | End: 2023-10-24

## 2023-10-20 NOTE — TELEPHONE ENCOUNTER
Patient called said has had cold and flu symptoms since Friday last, sore throat, sinus congestion, productive cough afebrile, no covid test   Pharmacy [facility] in [place]  In liquid form if possible   Taking OTC Mucinex Nyquil, but just not helping Pl  Advise to go be tested for covid as coughing during call and it was a barky cough  Please call [phone]

## 2023-10-23 ENCOUNTER — TELEPHONE (OUTPATIENT)
Dept: PRIMARY CARE | Facility: CLINIC | Age: 78
End: 2023-10-23
Payer: MEDICARE

## 2023-10-23 NOTE — TELEPHONE ENCOUNTER
Yzfwonblazxxrhhh-Abpsowacg-On was called in to pharmacy on 10/20. Patient was told to call office on Monday if she still wasn't feeling well and an antibiotic will be called in to pharmacy. Patient states she can't swallow normal size pills. Kendell

## 2023-10-24 ENCOUNTER — OFFICE VISIT (OUTPATIENT)
Dept: PRIMARY CARE | Facility: CLINIC | Age: 78
End: 2023-10-24
Payer: MEDICARE

## 2023-10-24 VITALS
BODY MASS INDEX: 29.4 KG/M2 | DIASTOLIC BLOOD PRESSURE: 74 MMHG | SYSTOLIC BLOOD PRESSURE: 116 MMHG | WEIGHT: 194 LBS | HEART RATE: 74 BPM | HEIGHT: 68 IN

## 2023-10-24 DIAGNOSIS — E53.8 VITAMIN B12 DEFICIENCY: ICD-10-CM

## 2023-10-24 DIAGNOSIS — J44.89 COPD (CHRONIC OBSTRUCTIVE PULMONARY DISEASE) WITH CHRONIC BRONCHITIS (MULTI): Primary | ICD-10-CM

## 2023-10-24 PROCEDURE — 1160F RVW MEDS BY RX/DR IN RCRD: CPT | Performed by: INTERNAL MEDICINE

## 2023-10-24 PROCEDURE — 1159F MED LIST DOCD IN RCRD: CPT | Performed by: INTERNAL MEDICINE

## 2023-10-24 PROCEDURE — 1036F TOBACCO NON-USER: CPT | Performed by: INTERNAL MEDICINE

## 2023-10-24 PROCEDURE — 99213 OFFICE O/P EST LOW 20 MIN: CPT | Performed by: INTERNAL MEDICINE

## 2023-10-24 PROCEDURE — 96372 THER/PROPH/DIAG INJ SC/IM: CPT | Performed by: INTERNAL MEDICINE

## 2023-10-24 RX ORDER — CYANOCOBALAMIN 1000 UG/ML
1000 INJECTION, SOLUTION INTRAMUSCULAR; SUBCUTANEOUS ONCE
Status: COMPLETED | OUTPATIENT
Start: 2023-10-24 | End: 2023-10-24

## 2023-10-24 RX ORDER — PREDNISONE 20 MG/1
TABLET ORAL
Qty: 18 TABLET | Refills: 0 | Status: SHIPPED | OUTPATIENT
Start: 2023-10-24 | End: 2023-10-31 | Stop reason: ALTCHOICE

## 2023-10-24 RX ADMIN — CYANOCOBALAMIN 1000 MCG: 1000 INJECTION, SOLUTION INTRAMUSCULAR; SUBCUTANEOUS at 14:47

## 2023-10-24 ASSESSMENT — PATIENT HEALTH QUESTIONNAIRE - PHQ9
SUM OF ALL RESPONSES TO PHQ9 QUESTIONS 1 AND 2: 0
1. LITTLE INTEREST OR PLEASURE IN DOING THINGS: NOT AT ALL
2. FEELING DOWN, DEPRESSED OR HOPELESS: NOT AT ALL

## 2023-10-24 ASSESSMENT — ENCOUNTER SYMPTOMS
DEPRESSION: 0
LOSS OF SENSATION IN FEET: 0
OCCASIONAL FEELINGS OF UNSTEADINESS: 0

## 2023-10-24 NOTE — PROGRESS NOTES
"Subjective   Reason for Visit: Jie Crocker is an 78 y.o. female here for a visit.     Past Medical, Surgical, and Family History reviewed and updated in chart.    Reviewed all medications by prescribing practitioner or clinical pharmacist (such as prescriptions, OTCs, herbal therapies and supplements) and documented in the medical record.    HPI    Patient Care Team:  Magdaleno Nielson DO as PCP - General  Magdaleno Nielson DO as PCP - [facility] Attributed Provider     Review of Systems   All other systems reviewed and are negative.      Objective   Vitals:  /74 (BP Location: Right arm, Patient Position: Sitting)   Pulse 74   Ht 1.727 m (5' 8\")   Wt 88 kg (194 lb)   BMI 29.50 kg/m²       Physical Exam  Pulmonary:      Breath sounds: Wheezing and rhonchi present.         Assessment/Plan   Problem List Items Addressed This Visit       COPD (chronic obstructive pulmonary disease) with chronic bronchitis - Primary    Current Assessment & Plan     Patient with COPD exacerbation we will test patient for COVID-19 placed on steroid taper continued use of inhalers with albuterol and use of Symbicort reevaluate with follow-up appointment next week         Relevant Medications    predniSONE (Deltasone) 20 mg tablet    Vitamin B12 deficiency    Current Assessment & Plan     Given vitamin B12 injection today in office         Relevant Medications    cyanocobalamin (Vitamin B-12) injection 1,000 mcg (Completed)    predniSONE (Deltasone) 20 mg tablet          "

## 2023-10-24 NOTE — ASSESSMENT & PLAN NOTE
Patient with COPD exacerbation we will test patient for COVID-19 placed on steroid taper continued use of inhalers with albuterol and use of Symbicort reevaluate with follow-up appointment next week

## 2023-10-25 ENCOUNTER — APPOINTMENT (OUTPATIENT)
Dept: INTEGRATIVE MEDICINE | Facility: CLINIC | Age: 78
End: 2023-10-25
Payer: MEDICARE

## 2023-10-27 ENCOUNTER — TELEPHONE (OUTPATIENT)
Dept: PRIMARY CARE | Facility: CLINIC | Age: 78
End: 2023-10-27
Payer: MEDICARE

## 2023-10-27 ENCOUNTER — APPOINTMENT (OUTPATIENT)
Dept: PHYSICAL THERAPY | Facility: CLINIC | Age: 78
End: 2023-10-27
Payer: MEDICARE

## 2023-10-27 NOTE — TELEPHONE ENCOUNTER
She is on day 3 of taking Prednisone---she has not slept for 2 nights    Can dosage be cut back on the Prednisone ?

## 2023-10-30 DIAGNOSIS — Z23 FLU VACCINE NEED: ICD-10-CM

## 2023-10-31 ENCOUNTER — OFFICE VISIT (OUTPATIENT)
Dept: PRIMARY CARE | Facility: CLINIC | Age: 78
End: 2023-10-31
Payer: MEDICARE

## 2023-10-31 VITALS
HEIGHT: 68 IN | DIASTOLIC BLOOD PRESSURE: 60 MMHG | SYSTOLIC BLOOD PRESSURE: 110 MMHG | HEART RATE: 66 BPM | BODY MASS INDEX: 29.86 KG/M2 | WEIGHT: 197 LBS

## 2023-10-31 DIAGNOSIS — E53.8 VITAMIN B12 DEFICIENCY: ICD-10-CM

## 2023-10-31 DIAGNOSIS — I73.9 PAD (PERIPHERAL ARTERY DISEASE) (CMS-HCC): ICD-10-CM

## 2023-10-31 DIAGNOSIS — E06.3 HYPOTHYROIDISM DUE TO HASHIMOTO'S THYROIDITIS: ICD-10-CM

## 2023-10-31 DIAGNOSIS — E78.5 DYSLIPIDEMIA, GOAL LDL BELOW 70: ICD-10-CM

## 2023-10-31 DIAGNOSIS — E78.2 MIXED HYPERLIPIDEMIA: ICD-10-CM

## 2023-10-31 DIAGNOSIS — G93.32 CFIDS (CHRONIC FATIGUE AND IMMUNE DYSFUNCTION SYNDROME) (MULTI): ICD-10-CM

## 2023-10-31 DIAGNOSIS — E55.9 VITAMIN D DEFICIENCY: ICD-10-CM

## 2023-10-31 DIAGNOSIS — D89.89 CFIDS (CHRONIC FATIGUE AND IMMUNE DYSFUNCTION SYNDROME) (MULTI): ICD-10-CM

## 2023-10-31 DIAGNOSIS — E03.8 HYPOTHYROIDISM DUE TO HASHIMOTO'S THYROIDITIS: ICD-10-CM

## 2023-10-31 DIAGNOSIS — J44.89 COPD (CHRONIC OBSTRUCTIVE PULMONARY DISEASE) WITH CHRONIC BRONCHITIS (MULTI): ICD-10-CM

## 2023-10-31 DIAGNOSIS — Z00.00 MEDICARE ANNUAL WELLNESS VISIT, SUBSEQUENT: Primary | ICD-10-CM

## 2023-10-31 DIAGNOSIS — Z00.00 ROUTINE GENERAL MEDICAL EXAMINATION AT HEALTH CARE FACILITY: ICD-10-CM

## 2023-10-31 PROBLEM — R73.02 IGT (IMPAIRED GLUCOSE TOLERANCE): Status: RESOLVED | Noted: 2023-06-28 | Resolved: 2023-10-31

## 2023-10-31 PROBLEM — R25.2 CRAMP OF LIMB: Status: RESOLVED | Noted: 2023-06-28 | Resolved: 2023-10-31

## 2023-10-31 PROCEDURE — 99497 ADVNCD CARE PLAN 30 MIN: CPT | Performed by: INTERNAL MEDICINE

## 2023-10-31 PROCEDURE — 1170F FXNL STATUS ASSESSED: CPT | Performed by: INTERNAL MEDICINE

## 2023-10-31 PROCEDURE — 1160F RVW MEDS BY RX/DR IN RCRD: CPT | Performed by: INTERNAL MEDICINE

## 2023-10-31 PROCEDURE — 99214 OFFICE O/P EST MOD 30 MIN: CPT | Performed by: INTERNAL MEDICINE

## 2023-10-31 PROCEDURE — 1036F TOBACCO NON-USER: CPT | Performed by: INTERNAL MEDICINE

## 2023-10-31 PROCEDURE — G0444 DEPRESSION SCREEN ANNUAL: HCPCS | Performed by: INTERNAL MEDICINE

## 2023-10-31 PROCEDURE — G0439 PPPS, SUBSEQ VISIT: HCPCS | Performed by: INTERNAL MEDICINE

## 2023-10-31 PROCEDURE — 1159F MED LIST DOCD IN RCRD: CPT | Performed by: INTERNAL MEDICINE

## 2023-10-31 RX ORDER — BUDESONIDE AND FORMOTEROL FUMARATE DIHYDRATE 160; 4.5 UG/1; UG/1
2 AEROSOL RESPIRATORY (INHALATION) 2 TIMES DAILY
Qty: 90 EACH | Refills: 3 | Status: SHIPPED | OUTPATIENT
Start: 2023-10-31

## 2023-10-31 ASSESSMENT — ANXIETY QUESTIONNAIRES
3. WORRYING TOO MUCH ABOUT DIFFERENT THINGS: NOT AT ALL
IF YOU CHECKED OFF ANY PROBLEMS ON THIS QUESTIONNAIRE, HOW DIFFICULT HAVE THESE PROBLEMS MADE IT FOR YOU TO DO YOUR WORK, TAKE CARE OF THINGS AT HOME, OR GET ALONG WITH OTHER PEOPLE: NOT DIFFICULT AT ALL
2. NOT BEING ABLE TO STOP OR CONTROL WORRYING: NOT AT ALL
GAD7 TOTAL SCORE: 0
6. BECOMING EASILY ANNOYED OR IRRITABLE: NOT AT ALL
4. TROUBLE RELAXING: NOT AT ALL
5. BEING SO RESTLESS THAT IT IS HARD TO SIT STILL: NOT AT ALL
1. FEELING NERVOUS, ANXIOUS, OR ON EDGE: NOT AT ALL
7. FEELING AFRAID AS IF SOMETHING AWFUL MIGHT HAPPEN: NOT AT ALL

## 2023-10-31 ASSESSMENT — ACTIVITIES OF DAILY LIVING (ADL)
DOING_HOUSEWORK: INDEPENDENT
TAKING_MEDICATION: INDEPENDENT
GROCERY_SHOPPING: INDEPENDENT
BATHING: INDEPENDENT
MANAGING_FINANCES: INDEPENDENT
DRESSING: INDEPENDENT

## 2023-10-31 ASSESSMENT — PATIENT HEALTH QUESTIONNAIRE - PHQ9
1. LITTLE INTEREST OR PLEASURE IN DOING THINGS: NOT AT ALL
2. FEELING DOWN, DEPRESSED OR HOPELESS: NOT AT ALL
SUM OF ALL RESPONSES TO PHQ9 QUESTIONS 1 AND 2: 0

## 2023-10-31 ASSESSMENT — ENCOUNTER SYMPTOMS
DEPRESSION: 0
LOSS OF SENSATION IN FEET: 0
OCCASIONAL FEELINGS OF UNSTEADINESS: 0

## 2023-10-31 NOTE — ASSESSMENT & PLAN NOTE
Continue with annual surveillance carotid Dopplers completed July 2023 revealed less than 50% stenosis

## 2023-10-31 NOTE — ASSESSMENT & PLAN NOTE
Reevaluate lipid panel in the setting of suspected peripheral arterial disease with LDL goal less than 100

## 2023-10-31 NOTE — ASSESSMENT & PLAN NOTE
Reinitiate medical therapy with Symbicort to reduce exacerbation rate with recent exacerbation of acute on chronic bronchitis

## 2023-10-31 NOTE — ASSESSMENT & PLAN NOTE
Continue with regular exercise program as tolerated improvement in sleep continue low-dose of cyclobenzaprine at night optimize health or activity poor tolerance to medication trials in the past

## 2023-10-31 NOTE — PROGRESS NOTES
"Subjective   Reason for Visit: Jie Crocker is an 78 y.o. female here for a Medicare Wellness visit.          Reviewed all medications by prescribing practitioner or clinical pharmacist (such as prescriptions, OTCs, herbal therapies and supplements) and documented in the medical record.    HPI    Patient Care Team:  Magdaleno Nielson DO as PCP - General  Magdaleno Nielson DO as PCP - [facility] Attributed Provider     Review of Systems    Objective   Vitals:  /60 (BP Location: Right arm, Patient Position: Sitting)   Pulse 66   Ht 1.715 m (5' 7.5\")   Wt 89.4 kg (197 lb)   BMI 30.40 kg/m²       Physical Exam    Assessment/Plan   Problem List Items Addressed This Visit       Chronic venous insufficiency of lower extremity    COPD (chronic obstructive pulmonary disease) with chronic bronchitis     Other Visit Diagnoses       PAD (peripheral artery disease) (CMS/[facility])        Edema of left lower leg due to peripheral venous insufficiency                   "

## 2023-10-31 NOTE — PROGRESS NOTES
"Alcohol consumption becomes hazardous when consuming women oh over 65 years old greater than 7 drinks per week or greater than 3 drinks per occasion for men greater than 14 drinks per week or greater than 4 drinks per occasion.  I spent 15 minutes screening for alcohol use.Depression and anxiety screening completed   PHQ9 score   GAD7 score   I spent 15 minutes obtaining and discussing depression screening using PHQ 2 questions with results documented in chart.  Screening using PHQ-9 and CLARISSA-7 scores were used for follow-up with treatment and referral plan discussed.      I spent greater than 15 minutes face-to-face with individual providing recommendations for nutrition choices and exercise plan to help achieve weight reductionI spent greater than 15 minutes discussing advance care planning including the explanation and discussion of advanced directives.  If patient does not have current up-to-date documents examples and information provided on how to create both living will and power of .  toolkit was given to patient and was encouraged to work out completing these documents.Subjective   Reason for Visit: Jie Crocker is an 78 y.o. female here for a Medicare Wellness visit.     Past Medical, Surgical, and Family History reviewed and updated in chart.    Reviewed all medications by prescribing practitioner or clinical pharmacist (such as prescriptions, OTCs, herbal therapies and supplements) and documented in the medical record.    HPI    Patient Care Team:  Magdaleno Nielson DO as PCP - General  Magdaleno Nielson DO as PCP [facility]     Review of Systems   All other systems reviewed and are negative.      Objective   Vitals:  /60 (BP Location: Right arm, Patient Position: Sitting)   Pulse 66   Ht 1.715 m (5' 7.5\")   Wt 89.4 kg (197 lb)   BMI 30.40 kg/m²       Physical Exam  Vitals and nursing note reviewed.   Constitutional:       General: She is not in acute " distress.     Appearance: Normal appearance. She is well-developed. She is obese. She is not toxic-appearing.   HENT:      Head: Normocephalic and atraumatic.      Right Ear: Tympanic membrane and external ear normal.      Left Ear: Tympanic membrane and external ear normal.      Nose: Nose normal.      Mouth/Throat:      Mouth: Mucous membranes are moist.      Pharynx: Oropharynx is clear. No oropharyngeal exudate or posterior oropharyngeal erythema.      Tonsils: No tonsillar exudate. 2+ on the right. 2+ on the left.   Eyes:      Extraocular Movements: Extraocular movements intact.      Conjunctiva/sclera: Conjunctivae normal.   Cardiovascular:      Rate and Rhythm: Normal rate and regular rhythm.      Pulses: Normal pulses.      Heart sounds: Normal heart sounds. No murmur heard.  Pulmonary:      Effort: Pulmonary effort is normal.      Breath sounds: Normal breath sounds.   Abdominal:      General: Abdomen is flat. Bowel sounds are normal.      Palpations: Abdomen is soft.   Musculoskeletal:      Cervical back: Neck supple.   Lymphadenopathy:      Cervical: No cervical adenopathy.   Skin:     General: Skin is warm and dry.      Findings: No rash.   Neurological:      Mental Status: She is alert. Mental status is at baseline.   Psychiatric:         Mood and Affect: Mood normal.         Behavior: Behavior normal.         Thought Content: Thought content normal.         Judgment: Judgment normal.         Assessment/Plan   Problem List Items Addressed This Visit       Chronic venous insufficiency of lower extremity    COPD (chronic obstructive pulmonary disease) with chronic bronchitis    Relevant Medications    budesonide-formoteroL (Symbicort) 160-4.5 mcg/actuation inhaler     Other Visit Diagnoses       PAD (peripheral artery disease) (CMS/[facility])        Edema of left lower leg due to peripheral venous insufficiency

## 2023-11-02 ENCOUNTER — ALLIED HEALTH (OUTPATIENT)
Dept: INTEGRATIVE MEDICINE | Facility: CLINIC | Age: 78
End: 2023-11-02
Payer: MEDICARE

## 2023-11-02 DIAGNOSIS — M54.59 OTHER LOW BACK PAIN: Primary | ICD-10-CM

## 2023-11-02 PROCEDURE — 97810 ACUP 1/> WO ESTIM 1ST 15 MIN: CPT | Performed by: ACUPUNCTURIST

## 2023-11-02 PROCEDURE — 97811 ACUP 1/> W/O ESTIM EA ADD 15: CPT | Performed by: ACUPUNCTURIST

## 2023-11-02 ASSESSMENT — ENCOUNTER SYMPTOMS: BACK PAIN: 1

## 2023-11-02 NOTE — PROGRESS NOTES
Acupuncture Visit:     Subjective   Patient ID: Jie Crocker is a 78 y.o. female who presents for Back Pain (Chronic low)  Medicare A B : DELBERT  Initial : 9/13/23  3/12 in 90 days    States pain was worse the day of the tx.  Notes back worse since being sick recently.  Notes she has been sick this past week with cold and cough.   She is on a steroid, only slept 1 hour last night d/t side effect of steroid.  R elbow with pain today.    previous  No change in pain.  States mid back pain always more painful than low back.  Mid back pain more during the day.  States R hip pain seems to radiate from low back area.  Notes RIGHT hip is more painful at night.      Back Pain        Session Information  Is this acupuncture treatment being billed to the patient's insurance company: Yes  This is visit number: 3  The patient has a total number of visits of: 12  Initial Acupuncture Treatment date: 09/13/23  Last Treatment date: 12/13/23  Name of Insurance Company: Medicare A  + B  Name of Supervising Physician: DELBERT  Visit Type: Follow-up visit         Review of Systems   Musculoskeletal:  Positive for back pain.            Provider reviewed plan for the acupuncture session, precautions and contraindications. Patient/guardian/hospital staff has given consent to treat with full understanding of what to expect during the session. Before acupuncture began, provider explained to the patient to communicate at any time if the procedure was causing discomfort past their tolerance level. Patient agreed to advise acupuncturist. The acupuncturist counseled the patient on the risks of acupuncture treatment including pain, infection, bleeding, and no relief of pain. The patient was positioned comfortably. There was no evidence of infection at the site of needle insertions.    Objective   Physical Exam              Acupuncture Treatment  Body Points - Left: SP 9.5, SP 6-4, SI 3, 22.06  Body Points - Bilateral: Kd 6, 7, Kailey 7, SJ 5 (next  consider back tx-UB pts)  Body Points - Right: JENNY HUNTER 11  Other Techniques Utilized: TDP Lamp  TDP Lamp Descripton: feet  Needle Count In: 18  Needle Count Out: 18  Total Face to Face Time (min): 35 minutes              Assessment/Plan

## 2023-11-09 ENCOUNTER — ALLIED HEALTH (OUTPATIENT)
Dept: INTEGRATIVE MEDICINE | Facility: CLINIC | Age: 78
End: 2023-11-09
Payer: MEDICARE

## 2023-11-09 DIAGNOSIS — M54.59 OTHER LOW BACK PAIN: Primary | ICD-10-CM

## 2023-11-09 PROCEDURE — 97811 ACUP 1/> W/O ESTIM EA ADD 15: CPT | Performed by: ACUPUNCTURIST

## 2023-11-09 PROCEDURE — 97810 ACUP 1/> WO ESTIM 1ST 15 MIN: CPT | Performed by: ACUPUNCTURIST

## 2023-11-09 ASSESSMENT — ENCOUNTER SYMPTOMS: BACK PAIN: 1

## 2023-11-09 NOTE — PROGRESS NOTES
Acupuncture Visit:     Subjective   Patient ID: Jie Crocker is a 78 y.o. female who presents for Back Pain  Medicare A B : DELBERT  Initial : 9/13/23  4/12 in 90 days    States no change in pain.  BL hips and ankles bothering her this past week.    previous  States pain was worse the day of the tx.  Notes back worse since being sick recently.  Notes she has been sick this past week with cold and cough.   She is on a steroid, only slept 1 hour last night d/t side effect of steroid.  R elbow with pain today.    previous  No change in pain.  States mid back pain always more painful than low back.  Mid back pain more during the day.  States R hip pain seems to radiate from low back area.  Notes RIGHT hip is more painful at night.      Back Pain        Session Information  Is this acupuncture treatment being billed to the patient's insurance company: Yes  This is visit number: 4  The patient has a total number of visits of: 12  Initial Acupuncture Treatment date: 09/13/23  Name of Insurance Company: Medicare A  + B  Name of Supervising Physician: DELBERT         Review of Systems   Musculoskeletal:  Positive for back pain.            Provider reviewed plan for the acupuncture session, precautions and contraindications. Patient/guardian/hospital staff has given consent to treat with full understanding of what to expect during the session. Before acupuncture began, provider explained to the patient to communicate at any time if the procedure was causing discomfort past their tolerance level. Patient agreed to advise acupuncturist. The acupuncturist counseled the patient on the risks of acupuncture treatment including pain, infection, bleeding, and no relief of pain. The patient was positioned comfortably. There was no evidence of infection at the site of needle insertions.    Objective   Physical Exam              Acupuncture Treatment  Body Points - Left: SP 6-4, SP 9 1.5, 22.06, SI 3  Body Points - Bilateral: Kd 6, 7,   27, GB 41  Body Points - Right: LGDB  Other Techniques Utilized: TDP Lamp  TDP Lamp Descripton: feet  Needle Count In: 17  Needle Count Out: 17  Needle Retention Time (min): 25 minutes  Total Face to Face Time (min): 35 minutes              Assessment/Plan

## 2023-11-15 ENCOUNTER — ALLIED HEALTH (OUTPATIENT)
Dept: INTEGRATIVE MEDICINE | Facility: CLINIC | Age: 78
End: 2023-11-15
Payer: MEDICARE

## 2023-11-15 VITALS
HEART RATE: 62 BPM | WEIGHT: 198.4 LBS | BODY MASS INDEX: 30.62 KG/M2 | DIASTOLIC BLOOD PRESSURE: 69 MMHG | OXYGEN SATURATION: 93 % | SYSTOLIC BLOOD PRESSURE: 107 MMHG

## 2023-11-15 DIAGNOSIS — G25.81 RESTLESS LEG: ICD-10-CM

## 2023-11-15 DIAGNOSIS — M54.6 DORSALGIA OF THORACIC REGION: Primary | ICD-10-CM

## 2023-11-15 DIAGNOSIS — E55.9 VITAMIN D DEFICIENCY: ICD-10-CM

## 2023-11-15 DIAGNOSIS — M25.551 PAIN IN RIGHT HIP: ICD-10-CM

## 2023-11-15 DIAGNOSIS — E53.8 VITAMIN B12 DEFICIENCY: ICD-10-CM

## 2023-11-15 DIAGNOSIS — M15.9 GENERALIZED OSTEOARTHRITIS OF MULTIPLE SITES: ICD-10-CM

## 2023-11-15 PROCEDURE — 99215 OFFICE O/P EST HI 40 MIN: CPT | Performed by: INTERNAL MEDICINE

## 2023-11-15 ASSESSMENT — ENCOUNTER SYMPTOMS
SLEEP DISTURBANCE: 0
BACK PAIN: 1
COUGH: 0
ARTHRALGIAS: 0
MYALGIAS: 0
NERVOUS/ANXIOUS: 0
FATIGUE: 0
HEADACHES: 0
DIFFICULTY URINATING: 0
APPETITE CHANGE: 0
FEVER: 0
WEAKNESS: 0
DYSURIA: 0
SHORTNESS OF BREATH: 0

## 2023-11-15 NOTE — PROGRESS NOTES
Integrative Medicine Follow-up Visit :     Subjective   Patient ID: Jie Crocker is a 78 y.o. female who presents for fuv       HPI  She became deathly sick since her last visit.  She is better now. Was tested for covid and it was negatvie.     Having trouble with energy. So tired all the time.  Not exercising. Not able to walk. Has too much hip pain. Has even used a walker. Uses it once every few weeks.  Having trouble with balance. Holds on to things to walk and notices her posture is leaning forward.   Taking vitamin b12 every day but not sure of the dose.     Seeing acupuncture for her low back pain. It is not helpful yet. Has gone several times. Still willing to go more to see if it improves.  Physical therapy hurts her. Has lots of little pains-       Continues to eat organic food.    Sleep: had trouble with sleep while on prednisone and could not sleep. She wakes with leg cramps regularly. Takes calcium and magnesium to help.        Pain:pain all over.  Not pain when sitting. When she tries to sew or do dishes.     Review of Systems   Constitutional:  Negative for appetite change, fatigue and fever.   HENT:  Negative for congestion and hearing loss.    Eyes:  Negative for visual disturbance.   Respiratory:  Negative for cough and shortness of breath.    Cardiovascular:  Negative for chest pain and leg swelling.   Genitourinary:  Negative for difficulty urinating, dysuria and urgency.   Musculoskeletal:  Positive for back pain and gait problem. Negative for arthralgias and myalgias.   Skin:  Negative for rash.   Neurological:  Negative for weakness and headaches.   Psychiatric/Behavioral:  Negative for behavioral problems and sleep disturbance. The patient is not nervous/anxious.                   Objective   /69 (BP Location: Right arm, Patient Position: Sitting)   Pulse 62   Wt 90 kg (198 lb 6.4 oz)   SpO2 93%   BMI 30.62 kg/m²     Physical Exam  HENT:      Head: Normocephalic and atraumatic.       Mouth/Throat:      Mouth: Mucous membranes are moist.   Cardiovascular:      Rate and Rhythm: Normal rate.   Pulmonary:      Effort: Pulmonary effort is normal. No respiratory distress.   Musculoskeletal:         General: No swelling or deformity.      Cervical back: Normal range of motion.   Skin:     General: Skin is dry.      Findings: No rash.   Neurological:      General: No focal deficit present.      Mental Status: She is alert and oriented to person, place, and time.      Gait: Gait abnormal.      Comments: Very slow to get up and to walk.    Psychiatric:         Thought Content: Thought content normal.      Comments: Normal affect                      Assessment/Plan     Problem List Items Addressed This Visit             ICD-10-CM    Dorsalgia of thoracic region - Primary M54.6     Encouraged her to continue PT but she says it makes pain worse.  Discussed importance of movement.   Leads very sedentary life.   Is open to trying chiropractic care again. Referred.          Relevant Orders    Referral to [employer]    Magnesium    Generalized osteoarthritis of multiple sites M15.9    Relevant Orders    Referral to [employer]    Vitamin B12 deficiency E53.8     She is not sure of dose that she is taking. Seems a bit confused today and reports memory loss. Make sure she is still taking this. Lab ordered.          Relevant Orders    Vitamin B12    Vitamin D deficiency E55.9     Advised to take daily.         Restless leg G25.81     Check ferritin. Had no anemia on recent blood work. Hard to tell if cramps or resltess legs going on.   Discussed stretching. Check magnesium level.          Relevant Orders    Ferritin     Other Visit Diagnoses         Codes    Pain in right hip     M25.551    Relevant Orders    Ferritin              Recommend Follow up in : 4 month      Chiquita Saba MD PhD

## 2023-11-15 NOTE — PATIENT INSTRUCTIONS
See PT again for your thoracic back pain.  Try to take the magnesium cream on your legs every night.  Stretch your legs three times per day.  Stop b12 for three days then get your labs.   Get up and move for five minutes every two hours at a minimum. Stretch twice a day.   Return in 4 months or as needed.   Chiquita Saba MD PhD

## 2023-11-15 NOTE — ASSESSMENT & PLAN NOTE
Encouraged her to continue PT but she says it makes pain worse.  Discussed importance of movement.   Leads very sedentary life.   Is open to trying chiropractic care again. Referred.

## 2023-11-15 NOTE — ASSESSMENT & PLAN NOTE
Check ferritin. Had no anemia on recent blood work. Hard to tell if cramps or resltess legs going on.   Discussed stretching. Check magnesium level.

## 2023-11-15 NOTE — ASSESSMENT & PLAN NOTE
She is not sure of dose that she is taking. Seems a bit confused today and reports memory loss. Make sure she is still taking this. Lab ordered.

## 2023-11-16 ENCOUNTER — ALLIED HEALTH (OUTPATIENT)
Dept: INTEGRATIVE MEDICINE | Facility: CLINIC | Age: 78
End: 2023-11-16
Payer: MEDICARE

## 2023-11-16 DIAGNOSIS — M15.9 GENERALIZED OSTEOARTHRITIS OF MULTIPLE SITES: ICD-10-CM

## 2023-11-16 DIAGNOSIS — M54.6 DORSALGIA OF THORACIC REGION: ICD-10-CM

## 2023-11-16 DIAGNOSIS — M54.59 OTHER LOW BACK PAIN: Primary | ICD-10-CM

## 2023-11-16 PROCEDURE — 97810 ACUP 1/> WO ESTIM 1ST 15 MIN: CPT | Performed by: ACUPUNCTURIST

## 2023-11-16 PROCEDURE — 97811 ACUP 1/> W/O ESTIM EA ADD 15: CPT | Performed by: ACUPUNCTURIST

## 2023-11-16 ASSESSMENT — ENCOUNTER SYMPTOMS: BACK PAIN: 1

## 2023-11-16 NOTE — PROGRESS NOTES
Acupuncture Visit:     Subjective   Patient ID: Jie Crocker is a 78 y.o. female who presents for Back Pain  Medicare A B : DELBERT  Initial : 9/13/23 4/12 in 90 days    No change in LBP.  R shoulder with pain.  BL hip with pain.  States she is having a bladder procedure Dec 13th. Currently with pessary.    previous  States no change in pain.  BL hips and ankles bothering her this past week.    Back Pain        Session Information  Is this acupuncture treatment being billed to the patient's insurance company: Yes  This is visit number: 5  The patient has a total number of visits of: 12  Initial Acupuncture Treatment date: 09/13/23  Name of Insurance Company: Medicare A  + B  Name of Supervising Physician: DELBERT         Review of Systems   Musculoskeletal:  Positive for back pain.            Provider reviewed plan for the acupuncture session, precautions and contraindications. Patient/guardian/hospital staff has given consent to treat with full understanding of what to expect during the session. Before acupuncture began, provider explained to the patient to communicate at any time if the procedure was causing discomfort past their tolerance level. Patient agreed to advise acupuncturist. The acupuncturist counseled the patient on the risks of acupuncture treatment including pain, infection, bleeding, and no relief of pain. The patient was positioned comfortably. There was no evidence of infection at the site of needle insertions.    Objective   Physical Exam              Acupuncture Treatment  Body Points - Bilateral: Du 20, Kd 7, SP 6, 9, GB 41, UB 60, 62  Body Points - Right: 44.06  Other Techniques Utilized: TDP Lamp, Topicals  Topicals Description: moxa feet  TDP Lamp Descripton: feet 4 moxa  Needle Count In: 14  Needle Count Out: 14  Total Face to Face Time (min): 35 minutes              Assessment/Plan

## 2023-11-20 ENCOUNTER — LAB (OUTPATIENT)
Dept: LAB | Facility: LAB | Age: 78
End: 2023-11-20
Payer: MEDICARE

## 2023-11-20 DIAGNOSIS — G25.81 RESTLESS LEG: ICD-10-CM

## 2023-11-20 DIAGNOSIS — M54.6 DORSALGIA OF THORACIC REGION: ICD-10-CM

## 2023-11-20 DIAGNOSIS — M25.551 PAIN IN RIGHT HIP: ICD-10-CM

## 2023-11-20 DIAGNOSIS — E53.8 VITAMIN B12 DEFICIENCY: ICD-10-CM

## 2023-11-20 LAB
FERRITIN SERPL-MCNC: 210 NG/ML (ref 8–150)
MAGNESIUM SERPL-MCNC: 2.2 MG/DL (ref 1.6–2.4)
VIT B12 SERPL-MCNC: 1346 PG/ML (ref 211–911)

## 2023-11-20 PROCEDURE — 82607 VITAMIN B-12: CPT

## 2023-11-20 PROCEDURE — 82728 ASSAY OF FERRITIN: CPT

## 2023-11-20 PROCEDURE — 36415 COLL VENOUS BLD VENIPUNCTURE: CPT

## 2023-11-20 PROCEDURE — 83735 ASSAY OF MAGNESIUM: CPT

## 2023-11-24 NOTE — RESULT ENCOUNTER NOTE
Alonso Crocker  Your labs look ok. Please let me know if you have questions.   Chiquita Saba MD PhD

## 2023-12-03 ENCOUNTER — ANESTHESIA EVENT (OUTPATIENT)
Dept: OPERATING ROOM | Facility: HOSPITAL | Age: 78
End: 2023-12-03
Payer: MEDICARE

## 2023-12-05 ENCOUNTER — PRE-ADMISSION TESTING (OUTPATIENT)
Dept: PREADMISSION TESTING | Facility: HOSPITAL | Age: 78
End: 2023-12-05
Payer: MEDICARE

## 2023-12-05 ENCOUNTER — OFFICE VISIT (OUTPATIENT)
Dept: UROLOGY | Facility: CLINIC | Age: 78
End: 2023-12-05
Payer: MEDICARE

## 2023-12-05 VITALS
HEART RATE: 99 BPM | RESPIRATION RATE: 20 BRPM | SYSTOLIC BLOOD PRESSURE: 106 MMHG | OXYGEN SATURATION: 95 % | HEIGHT: 68 IN | BODY MASS INDEX: 29.86 KG/M2 | WEIGHT: 197 LBS | TEMPERATURE: 96.4 F

## 2023-12-05 VITALS — DIASTOLIC BLOOD PRESSURE: 74 MMHG | SYSTOLIC BLOOD PRESSURE: 120 MMHG | HEART RATE: 67 BPM

## 2023-12-05 DIAGNOSIS — N81.9 FEMALE GENITAL PROLAPSE, UNSPECIFIED TYPE: Primary | ICD-10-CM

## 2023-12-05 DIAGNOSIS — N32.81 OAB (OVERACTIVE BLADDER): Primary | ICD-10-CM

## 2023-12-05 DIAGNOSIS — N39.3 SUI (STRESS URINARY INCONTINENCE, FEMALE): ICD-10-CM

## 2023-12-05 DIAGNOSIS — N32.81 OAB (OVERACTIVE BLADDER): ICD-10-CM

## 2023-12-05 DIAGNOSIS — N18.9 CHRONIC KIDNEY DISEASE, UNSPECIFIED CKD STAGE: ICD-10-CM

## 2023-12-05 DIAGNOSIS — I65.23 BILATERAL CAROTID ARTERY STENOSIS: ICD-10-CM

## 2023-12-05 DIAGNOSIS — J44.89 COPD (CHRONIC OBSTRUCTIVE PULMONARY DISEASE) WITH CHRONIC BRONCHITIS (MULTI): ICD-10-CM

## 2023-12-05 LAB
ANION GAP SERPL CALC-SCNC: 13 MMOL/L (ref 10–20)
BUN SERPL-MCNC: 11 MG/DL (ref 6–23)
CALCIUM SERPL-MCNC: 8.8 MG/DL (ref 8.6–10.3)
CHLORIDE SERPL-SCNC: 107 MMOL/L (ref 98–107)
CO2 SERPL-SCNC: 21 MMOL/L (ref 21–32)
CREAT SERPL-MCNC: 0.7 MG/DL (ref 0.5–1.05)
ERYTHROCYTE [DISTWIDTH] IN BLOOD BY AUTOMATED COUNT: 12.7 % (ref 11.5–14.5)
GFR SERPL CREATININE-BSD FRML MDRD: 90 ML/MIN/1.73M*2
GLUCOSE SERPL-MCNC: 90 MG/DL (ref 74–99)
HCT VFR BLD AUTO: 44.4 % (ref 36–46)
HGB BLD-MCNC: 14.2 G/DL (ref 12–16)
MCH RBC QN AUTO: 28.5 PG (ref 26–34)
MCHC RBC AUTO-ENTMCNC: 32 G/DL (ref 32–36)
MCV RBC AUTO: 89 FL (ref 80–100)
NRBC BLD-RTO: 0 /100 WBCS (ref 0–0)
PLATELET # BLD AUTO: 230 X10*3/UL (ref 150–450)
POTASSIUM SERPL-SCNC: 3.9 MMOL/L (ref 3.5–5.3)
RBC # BLD AUTO: 4.99 X10*6/UL (ref 4–5.2)
SODIUM SERPL-SCNC: 137 MMOL/L (ref 136–145)
WBC # BLD AUTO: 7.5 X10*3/UL (ref 4.4–11.3)

## 2023-12-05 PROCEDURE — 80048 BASIC METABOLIC PNL TOTAL CA: CPT | Performed by: CLINICAL NURSE SPECIALIST

## 2023-12-05 PROCEDURE — 99214 OFFICE O/P EST MOD 30 MIN: CPT | Performed by: STUDENT IN AN ORGANIZED HEALTH CARE EDUCATION/TRAINING PROGRAM

## 2023-12-05 PROCEDURE — 1036F TOBACCO NON-USER: CPT | Performed by: STUDENT IN AN ORGANIZED HEALTH CARE EDUCATION/TRAINING PROGRAM

## 2023-12-05 PROCEDURE — 36415 COLL VENOUS BLD VENIPUNCTURE: CPT

## 2023-12-05 PROCEDURE — 99214 OFFICE O/P EST MOD 30 MIN: CPT | Performed by: CLINICAL NURSE SPECIALIST

## 2023-12-05 PROCEDURE — 85027 COMPLETE CBC AUTOMATED: CPT | Performed by: CLINICAL NURSE SPECIALIST

## 2023-12-05 PROCEDURE — 1159F MED LIST DOCD IN RCRD: CPT | Performed by: STUDENT IN AN ORGANIZED HEALTH CARE EDUCATION/TRAINING PROGRAM

## 2023-12-05 PROCEDURE — 93005 ELECTROCARDIOGRAM TRACING: CPT

## 2023-12-05 PROCEDURE — 1160F RVW MEDS BY RX/DR IN RCRD: CPT | Performed by: STUDENT IN AN ORGANIZED HEALTH CARE EDUCATION/TRAINING PROGRAM

## 2023-12-05 RX ORDER — TAMSULOSIN HYDROCHLORIDE 0.4 MG/1
CAPSULE ORAL
Qty: 10 CAPSULE | Refills: 0 | Status: SHIPPED | OUTPATIENT
Start: 2023-12-05 | End: 2024-04-30 | Stop reason: WASHOUT

## 2023-12-05 RX ORDER — IBUPROFEN 200 MG
TABLET ORAL EVERY 6 HOURS PRN
Status: ON HOLD | COMMUNITY
End: 2023-12-13 | Stop reason: ALTCHOICE

## 2023-12-05 ASSESSMENT — ENCOUNTER SYMPTOMS
PSYCHIATRIC NEGATIVE: 1
ENDOCRINE NEGATIVE: 1
ALLERGIC/IMMUNOLOGIC NEGATIVE: 1
RESPIRATORY NEGATIVE: 1
CONSTITUTIONAL NEGATIVE: 1
HEMATOLOGIC/LYMPHATIC NEGATIVE: 1
NEUROLOGICAL NEGATIVE: 1
MUSCULOSKELETAL NEGATIVE: 1
CARDIOVASCULAR NEGATIVE: 1
FREQUENCY: 1
EYES NEGATIVE: 1
GASTROINTESTINAL NEGATIVE: 1

## 2023-12-05 ASSESSMENT — CHADS2 SCORE
DIABETES: NO
AGE GREATER THAN OR EQUAL TO 75: YES
HYPERTENSION: YES
PRIOR STROKE OR TIA OR THROMBOEMBOLISM: NO
CHADS2 SCORE: 2
CHF: NO

## 2023-12-05 ASSESSMENT — LIFESTYLE VARIABLES: SMOKING_STATUS: NONSMOKER

## 2023-12-05 NOTE — PREPROCEDURE INSTRUCTIONS
Medication List            Accurate as of December 5, 2023 10:44 AM. Always use your most recent med list.                Acidophilus capsule  Generic drug: lactobacillus acidophilus     albuterol 90 mcg/actuation inhaler     budesonide-formoteroL 160-4.5 mcg/actuation inhaler  Commonly known as: Symbicort  Inhale 2 puffs 2 times a day.     cholecalciferol 50 MCG (2000 UT) tablet  Commonly known as: Vitamin D-3     cyanocobalamin 1,000 mcg/mL injection  Commonly known as: Vitamin B-12     estradiol 0.01 % (0.1 mg/gram) vaginal cream  Commonly known as: Estrace     ibuprofen 200 mg tablet     levothyroxine 112 mcg tablet  Commonly known as: Synthroid, Levoxyl  Take 1 tablet (112 mcg) by mouth once daily.     magnesium chloride 71.5 mg tablet,delayed release (DR/EC)     tamsulosin 0.4 mg 24 hr capsule  Commonly known as: Flomax  Take 3 days before surgery and 7 days after                              NPO Instructions:  May take thyroid medication morning of   Nothing to eat or drink after midnight   Hydrate well the day before  Last dose advil 12/6/2023 unless advised differently from Dr. Maya    Additional Instructions:     Wear  comfortable loose fitting clothing  Do not use moisturizers, creams, lotions or perfume  All jewelry and valuables should be left at home      Shower night before,deodorant only    Day of surgery bring dose of magnesium and vitamin b12

## 2023-12-05 NOTE — CPM/PAT H&P
CPM/PAT Evaluation       Name: Jie Crocker (Jie Crocker)  /Age: 1945/78 y.o.     In-Person       Chief Complaint: Hx of OAB, Scheduled for Laparoscopic sacrocolpopexy under general anesthesia per Dr. Maya on 23.         Past Medical History:   Diagnosis Date    Allergic contact dermatitis due to plants, except food 2021    Contact dermatitis due to poison ivy    Cervical disc disorder, unspecified, unspecified cervical region 2020    Cervical disc disease    Deficiency of other specified B group vitamins     Vitamin B 12 deficiency    Hypermobility syndrome 2020    Hypermobile joint syndrome of ankle    Hypothyroidism, unspecified 2020    Primary hypothyroidism    Localized edema 2020    Bilateral edema of lower extremity    Low back pain, unspecified 2020    Chronic midline low back pain, unspecified whether sciatica present    Myalgic encephalomyelitis/chronic fatigue syndrome 2021    Chronic fatigue syndrome with fibromyalgia    Neuralgia and neuritis, unspecified 2020    Thoracic neuralgia    Obesity, unspecified 2020    Class 1 obesity without serious comorbidity with body mass index (BMI) of 32.0 to 32.9 in adult, unspecified obesity type    Other fecal abnormalities 11/15/2021    Positive colorectal cancer screening using Cologuard test    Other low back pain 2022    Intractable low back pain    Other shoulder lesions, right shoulder 2020    Rotator cuff tendonitis, right    Other symptoms and signs involving the musculoskeletal system 2021    Weakness of lower extremity    Other symptoms and signs involving the musculoskeletal system 10/27/2021    Weakness of left upper extremity    Other thyrotoxicosis without thyrotoxic crisis or storm 2021    Hyperthyroidism with Hashimoto disease    Pain in left elbow 2021    Elbow pain, left    Pain in left knee 2021    Acute pain of left knee    Pain in right  finger(s) 01/03/2017    Thumb pain, right    Pain in right hip 01/03/2017    Pain of right hip joint    Pain in thoracic spine 08/05/2020    Chronic midline thoracic back pain    Pain in thoracic spine 08/05/2020    Chronic bilateral thoracic back pain    Personal history of diseases of the blood and blood-forming organs and certain disorders involving the immune mechanism 02/10/2022    History of anemia    Personal history of other diseases of the musculoskeletal system and connective tissue 03/08/2022    History of joint pain    Personal history of other diseases of the musculoskeletal system and connective tissue 07/25/2022    History of muscle spasm    Personal history of other diseases of the musculoskeletal system and connective tissue     History of back pain    Personal history of other diseases of the respiratory system     History of chronic obstructive lung disease    Personal history of other endocrine, nutritional and metabolic disease 09/03/2020    History of hyperthyroidism    Personal history of other endocrine, nutritional and metabolic disease 07/10/2021    History of hypothyroidism    Personal history of other endocrine, nutritional and metabolic disease 08/31/2020    History of vitamin B deficiency    Personal history of other endocrine, nutritional and metabolic disease     History of hypothyroidism    Personal history of other specified conditions 07/05/2022    History of balance disorder    Personal history of other specified conditions 06/24/2017    History of abdominal pain    Radiculopathy, cervical region 11/08/2021    Cervical neuritis    Repeated falls 07/05/2022    Multiple falls    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    Severe persistent asthma, uncomplicated 07/21/2021    Severe persistent asthmatic bronchitis without complication    Unspecified abdominal pain 11/15/2021    Intermittent abdominal pain    Unspecified dislocation of left ulnohumeral  joint, initial encounter 10/06/2021    Dislocation of left elbow, initial encounter    Venous insufficiency (chronic) (peripheral) 02/14/2022    Chronic venous insufficiency       Past Surgical History:   Procedure Laterality Date    OTHER SURGICAL HISTORY  09/03/2020    Laparoscopic hysterectomy    OTHER SURGICAL HISTORY  09/03/2020    Lumpectomy    OTHER SURGICAL HISTORY  09/03/2020    Tonsillectomy    OTHER SURGICAL HISTORY  09/03/2020    Cystoscopy    OTHER SURGICAL HISTORY  09/03/2020    Corneal lasik    OTHER SURGICAL HISTORY  11/13/2019    left       Patient  reports that she is not currently sexually active.    Family History   Problem Relation Name Age of Onset    Heart attack Mother          age 52    Heart attack Other      Dementia Other         Allergies   Allergen Reactions    Mold Shortness of breath    Corticosteroids (Glucocorticoids) Other     Leg cramps    Darvocet A500 [Propoxyphene N-Acetaminophen] Blurred vision    Ketamine Hallucinations    Pollen Extracts Unknown    Propoxyphene-Acetaminophen Other    Adhesive Tape-Silicones Rash    Piroxicam Other     burning to eyes and swelling       Prior to Admission medications    Medication Sig Start Date End Date Taking? Authorizing Provider   ibuprofen 200 mg tablet Take by mouth every 6 hours if needed for mild pain (1 - 3).   Yes Historical Provider, MD   albuterol 90 mcg/actuation inhaler Inhale 2 puffs every 6 hours if needed for wheezing. 6/3/20   Historical Provider, MD   budesonide-formoteroL (Symbicort) 160-4.5 mcg/actuation inhaler Inhale 2 puffs 2 times a day. 10/31/23   Magdaleno Nielson,    cholecalciferol (Vitamin D-3) 50 MCG (2000 UT) tablet Take 1 tablet (50 mcg) by mouth once daily.    Historical Provider, MD   cyanocobalamin (Vitamin B-12) 1,000 mcg/mL injection Inject 1 mL (1,000 mcg) into the muscle every 30 (thirty) days. 9/19/22   Historical Provider, MD   estradiol (Estrace) 0.01 % (0.1 mg/gram) vaginal cream Insert 0.5  Applicatorfuls (2 g) into the vagina 1 (one) time per week. 3/8/23   Historical Provider, MD   Lactobacillus acidophilus (Acidophilus) capsule Take 1 capsule by mouth once daily. 4/5/22   Historical Provider, MD   levothyroxine (Synthroid, Levoxyl) 112 mcg tablet Take 1 tablet (112 mcg) by mouth once daily. 8/2/23 8/1/24  Magdaleno Nielson,    magnesium chloride 71.5 mg tablet,delayed release (DR/EC) Take 2 tablets (143 mg) by mouth once daily. 2/13/20   Historical Provider, MD   tamsulosin (Flomax) 0.4 mg 24 hr capsule Take 3 days before surgery and 7 days after 10/3/23   Bernardino Maya MD          PAT AIRWAY:   Airway:     Mallampati::  III    Neck ROM::  Full  normal        Visit Vitals  BP (!) 106/0 Comment: palpitated LLE   Pulse 99   Temp 35.8 °C (96.4 °F)   Resp 20       DASI Risk Score    No data to display       Caprini DVT Assessment      Flowsheet Row Most Recent Value   DVT Score 10   Current Status COPD, Minor surgery planned, Major surgery planned, including arthroscopic and laproscopic (1-2 hours)   History Prior major surgery, COPD   Age Over 75 years   BMI 30 or less          Modified Frailty Index    No data to display       CHADS2 Stroke Risk  Current as of 28 minutes ago        N/A 3 - 100%: High Risk   2 - 3%: Medium Risk   0 - 2%: Low Risk     Last Change: N/A          This score determines the patient's risk of having a stroke if the patient has atrial fibrillation.        This score is not applicable to this patient. Components are not calculated.          Revised Cardiac Risk Index      Flowsheet Row Most Recent Value   Revised Cardiac Risk Calculator 0          Apfel Simplified Score      Flowsheet Row Most Recent Value   Apfel Simplified Score Calculator 3          Risk Analysis Index Results This Encounter    No data found in the last 1 encounters.       Stop Bang Score      Flowsheet Row Most Recent Value   Do you snore loudly? 0   Do you often feel tired or fatigued after your sleep? 0    Has anyone ever observed you stop breathing in your sleep? 0   Do you have or are you being treated for high blood pressure? 0   Recent BMI (Calculated) 30.4   Is BMI greater than 35 kg/m2? 0=No   Age older than 50 years old? 1=Yes   Is your neck circumference greater than 17 inches (Male) or 16 inches (Female)? 0   Gender - Male 0=No   STOP-BANG Total Score 1            Assessment and Plan:     Other:  Hx of OAB, Scheduled for Laparoscopic sacrocolpopexy under general anesthesia per Dr. Maya on 12/13/23. See H&P. Plan is to follow up with urology/GYN service post operatively.

## 2023-12-05 NOTE — H&P
History Of Present Illness  Jie Crocker is a 78 y.o. very pleasant female presenting with Hx of OAB, Scheduled for Laparoscopic sacrocolpopexy under general anesthesia per Dr. Maya on 12/13/23.        Past Medical History  Past Medical History:   Diagnosis Date    Allergic contact dermatitis due to plants, except food 07/09/2021    Contact dermatitis due to poison ivy    Cervical disc disorder, unspecified, unspecified cervical region 08/05/2020    Cervical disc disease    Deficiency of other specified B group vitamins     Vitamin B 12 deficiency    Hypermobility syndrome 08/31/2020    Hypermobile joint syndrome of ankle    Hypothyroidism, unspecified 12/22/2020    Primary hypothyroidism    Localized edema 08/27/2020    Bilateral edema of lower extremity    Low back pain, unspecified 09/29/2020    Chronic midline low back pain, unspecified whether sciatica present    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    Chronic fatigue syndrome with fibromyalgia    Neuralgia and neuritis, unspecified 08/05/2020    Thoracic neuralgia    Obesity, unspecified 02/13/2020    Class 1 obesity without serious comorbidity with body mass index (BMI) of 32.0 to 32.9 in adult, unspecified obesity type    Other fecal abnormalities 11/15/2021    Positive colorectal cancer screening using Cologuard test    Other low back pain 07/05/2022    Intractable low back pain    Other shoulder lesions, right shoulder 02/13/2020    Rotator cuff tendonitis, right    Other symptoms and signs involving the musculoskeletal system 12/13/2021    Weakness of lower extremity    Other symptoms and signs involving the musculoskeletal system 10/27/2021    Weakness of left upper extremity    Other thyrotoxicosis without thyrotoxic crisis or storm 01/11/2021    Hyperthyroidism with Hashimoto disease    Pain in left elbow 09/16/2021    Elbow pain, left    Pain in left knee 12/13/2021    Acute pain of left knee    Pain in right finger(s)  01/03/2017    Thumb pain, right    Pain in right hip 01/03/2017    Pain of right hip joint    Pain in thoracic spine 08/05/2020    Chronic midline thoracic back pain    Pain in thoracic spine 08/05/2020    Chronic bilateral thoracic back pain    Personal history of diseases of the blood and blood-forming organs and certain disorders involving the immune mechanism 02/10/2022    History of anemia    Personal history of other diseases of the musculoskeletal system and connective tissue 03/08/2022    History of joint pain    Personal history of other diseases of the musculoskeletal system and connective tissue 07/25/2022    History of muscle spasm    Personal history of other diseases of the musculoskeletal system and connective tissue     History of back pain    Personal history of other diseases of the respiratory system     History of chronic obstructive lung disease    Personal history of other endocrine, nutritional and metabolic disease 09/03/2020    History of hyperthyroidism    Personal history of other endocrine, nutritional and metabolic disease 07/10/2021    History of hypothyroidism    Personal history of other endocrine, nutritional and metabolic disease 08/31/2020    History of vitamin B deficiency    Personal history of other endocrine, nutritional and metabolic disease     History of hypothyroidism    Personal history of other specified conditions 07/05/2022    History of balance disorder    Personal history of other specified conditions 06/24/2017    History of abdominal pain    Radiculopathy, cervical region 11/08/2021    Cervical neuritis    Repeated falls 07/05/2022    Multiple falls    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    Severe persistent asthma, uncomplicated 07/21/2021    Severe persistent asthmatic bronchitis without complication    Unspecified abdominal pain 11/15/2021    Intermittent abdominal pain    Unspecified dislocation of left ulnohumeral joint,  initial encounter 10/06/2021    Dislocation of left elbow, initial encounter    Venous insufficiency (chronic) (peripheral) 02/14/2022    Chronic venous insufficiency       Surgical History  Past Surgical History:   Procedure Laterality Date    OTHER SURGICAL HISTORY  09/03/2020    Laparoscopic hysterectomy    OTHER SURGICAL HISTORY  09/03/2020    Lumpectomy    OTHER SURGICAL HISTORY  09/03/2020    Tonsillectomy    OTHER SURGICAL HISTORY  09/03/2020    Cystoscopy    OTHER SURGICAL HISTORY  09/03/2020    Corneal lasik    OTHER SURGICAL HISTORY  11/13/2019    left        Social History  She reports that she has never smoked. She has never used smokeless tobacco. She reports that she does not currently use alcohol. She reports that she does not use drugs.    Family History  Family History   Problem Relation Name Age of Onset    Heart attack Mother          age 52    Heart attack Other      Dementia Other          Allergies  Mold, Corticosteroids (glucocorticoids), Darvocet a500 [propoxyphene n-acetaminophen], Ketamine, Pollen extracts, Propoxyphene-acetaminophen, Adhesive tape-silicones, and Piroxicam    Review of Systems   Constitutional: Negative.    HENT: Negative.     Eyes: Negative.    Respiratory: Negative.     Cardiovascular: Negative.    Gastrointestinal: Negative.    Endocrine: Negative.    Genitourinary:  Positive for frequency.        OAB, urgency   Musculoskeletal: Negative.    Skin: Negative.    Allergic/Immunologic: Negative.    Neurological: Negative.    Hematological: Negative.    Psychiatric/Behavioral: Negative.     All other systems reviewed and are negative.       Physical Exam  Vitals and nursing note reviewed.   Constitutional:       Appearance: Normal appearance.   HENT:      Head: Normocephalic.      Nose: Nose normal.      Mouth/Throat:      Comments: Mallampati 3, finger breadth 2  Full neck ROM  No dentures/partials.   Eyes:      Pupils: Pupils are equal, round, and reactive to light.  "  Cardiovascular:      Rate and Rhythm: Normal rate and regular rhythm.      Heart sounds: S1 normal and S2 normal. Heart sounds are distant.      Comments: Distant/low heart tones  Ace trace edema, Left is slightly greater than Right (Patient states this became worse after having acupuncture recently)   Pulmonary:      Effort: Pulmonary effort is normal.      Breath sounds: Normal breath sounds.      Comments: Lungs clear throughout all fields.   Abdominal:      General: Bowel sounds are normal.      Palpations: Abdomen is soft.   Musculoskeletal:         General: Normal range of motion.      Cervical back: Normal range of motion.   Skin:     General: Skin is warm and dry.   Neurological:      General: No focal deficit present.      Mental Status: She is alert and oriented to person, place, and time.   Psychiatric:         Mood and Affect: Mood normal.         Behavior: Behavior normal.         Thought Content: Thought content normal.         Judgment: Judgment normal.          Last Recorded Vitals  Blood pressure (!) 106/0, pulse 99, temperature 35.8 °C (96.4 °F), resp. rate 20, height 1.727 m (5' 8\"), weight 89.4 kg (197 lb), SpO2 95 %.    Relevant Results    Current Outpatient Medications:     ibuprofen 200 mg tablet, Take by mouth every 6 hours if needed for mild pain (1 - 3)., Disp: , Rfl:     albuterol 90 mcg/actuation inhaler, Inhale 2 puffs every 6 hours if needed for wheezing., Disp: , Rfl:     budesonide-formoteroL (Symbicort) 160-4.5 mcg/actuation inhaler, Inhale 2 puffs 2 times a day., Disp: 90 each, Rfl: 3    cholecalciferol (Vitamin D-3) 50 MCG (2000 UT) tablet, Take 1 tablet (50 mcg) by mouth once daily., Disp: , Rfl:     cyanocobalamin (Vitamin B-12) 1,000 mcg/mL injection, Inject 1 mL (1,000 mcg) into the muscle every 30 (thirty) days., Disp: , Rfl:     estradiol (Estrace) 0.01 % (0.1 mg/gram) vaginal cream, Insert 0.5 Applicatorfuls (2 g) into the vagina 1 (one) time per week., Disp: , Rfl:     " Lactobacillus acidophilus (Acidophilus) capsule, Take 1 capsule by mouth once daily., Disp: , Rfl:     levothyroxine (Synthroid, Levoxyl) 112 mcg tablet, Take 1 tablet (112 mcg) by mouth once daily., Disp: 30 tablet, Rfl: 11    magnesium chloride 71.5 mg tablet,delayed release (DR/EC), Take 2 tablets (143 mg) by mouth once daily., Disp: , Rfl:     tamsulosin (Flomax) 0.4 mg 24 hr capsule, Take 3 days before surgery and 7 days after, Disp: 10 capsule, Rfl: 0     No results found for this or any previous visit (from the past 24 hour(s)).            Assessment/Plan   Problem List Items Addressed This Visit             ICD-10-CM    Bilateral carotid artery stenosis I65.23    Relevant Orders    Basic Metabolic Panel    CBC    ECG 12 Lead    COPD (chronic obstructive pulmonary disease) with chronic bronchitis J44.89    Relevant Orders    Basic Metabolic Panel    CBC    ECG 12 Lead    OAB (overactive bladder) - Primary N32.81    Relevant Orders    Basic Metabolic Panel    CBC       Hx of OAB, Scheduled for Laparoscopic sacrocolpopexy under general anesthesia per Dr. Maya on 12/13/23.   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 Arevalo read.   All surgery instructions reviewed by RN with patient. Patient verbalized understanding. All questions answered. Patient has pre-op scheduled with Dr. Maya today right after [facility] appointment.        I spent 25 minutes in the professional and overall care of this patient.      Shelbi Johnson, VITALIY-CNS

## 2023-12-05 NOTE — PROGRESS NOTES
CHIEF COMPLAINT: FUV Pre-surgery visit             HISTORY OF PRESENT ILLNESS:  This is a 78 y.o. female, who presents with a pre-surgery follow up visit. Patient is ready for her surgery. Patient is not sexually active currently but wants to keep the option open for the future.              HISTORY OF PRESENT ILLNESS:           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), Neuralgia and neuritis, unspecified (08/05/2020), Obesity, unspecified (02/13/2020), Other fecal abnormalities (11/15/2021), Other low back pain (07/05/2022), Other shoulder lesions, right shoulder (02/13/2020), Other symptoms and signs involving the musculoskeletal system (12/13/2021), Other symptoms and signs involving the musculoskeletal system (10/27/2021), Other thyrotoxicosis without thyrotoxic crisis or storm (01/11/2021), Pain in left elbow (09/16/2021), Pain in left knee (12/13/2021), Pain in right finger(s) (01/03/2017), Pain in right hip (01/03/2017), Pain in thoracic spine (08/05/2020), Pain in thoracic spine (08/05/2020), Personal history of diseases of the blood and blood-forming organs and certain disorders involving the immune mechanism (02/10/2022), Personal history of other diseases of the musculoskeletal system and connective tissue (03/08/2022), Personal history of other diseases of the musculoskeletal system and connective tissue (07/25/2022), Personal history of other diseases of the musculoskeletal system and connective tissue, Personal history of other diseases of the respiratory system, Personal history of  other endocrine, nutritional and metabolic disease (09/03/2020), Personal history of other endocrine, nutritional and metabolic disease (07/10/2021), Personal history of other endocrine, nutritional and metabolic disease (08/31/2020), Personal history of other endocrine, nutritional and metabolic disease, Personal history of other specified conditions (07/05/2022), Personal history of other specified conditions (06/24/2017), Radiculopathy, cervical region (11/08/2021), Repeated falls (07/05/2022), 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    Surgical History  She has a past surgical history that includes Other surgical history (09/03/2020); Other surgical history (09/03/2020); Other surgical history (09/03/2020); Other surgical history (09/03/2020); Other surgical history (09/03/2020); and Other surgical history (11/13/2019).     Social History  She reports that she has never smoked. She has never used smokeless tobacco. She reports that she does not currently use alcohol. She reports that she does not use drugs.    Family History  Family History   Problem Relation Name Age of Onset    Heart attack Mother          age 52    Heart attack Other      Dementia Other          Allergies  Mold, Corticosteroids (glucocorticoids), Darvocet a500 [propoxyphene n-acetaminophen], Ketamine, Pollen extracts, Propoxyphene-acetaminophen, Adhesive tape-silicones, and Piroxicam      A comprehensive 10+ review of systems was negative except for: see hpi                          PHYSICAL EXAMINATION:  BP Readings from Last 3 Encounters:   12/05/23 120/74   12/05/23 (!) 106/0   11/15/23 107/69      Wt Readings from Last 3 Encounters:   12/05/23 89.4 kg (197 lb)   11/15/23 90 kg (198 lb 6.4 oz)  "  10/31/23 89.4 kg (197 lb)      BMI: Estimated body mass index is 29.95 kg/m² as calculated from the following:    Height as of an earlier encounter on 12/5/23: 1.727 m (5' 8\").    Weight as of an earlier encounter on 12/5/23: 89.4 kg (197 lb).  BSA: Estimated body surface area is 2.07 meters squared as calculated from the following:    Height as of an earlier encounter on 12/5/23: 1.727 m (5' 8\").    Weight as of an earlier encounter on 12/5/23: 89.4 kg (197 lb).  HEENT: Normocephalic, atraumatic, PER EOMI, nonicteric, trachea normal, thyroid normal, oropharynx normal.  CARDIAC: regular rate & rhythm, S1 & S2 normal.  No heaves, thrills, gallops or murmurs.  LUNGS: Clear to auscultation, no spinal or CV tenderness.  EXTREMITIES: No evidence of cyanosis, clubbing or edema.                   Assessment:    78 year old patient referred from urge incontinence and stage II vaginal prolapse     POP:  Has pessary in place but wants to proceed   Wants to maintain sexual function   wants to proceed with laparoscopic sacrocolpopexy   Will prescribe Flomax  Follow up in 6 weeks after surgery       MEGAN:  developed MEGAN with pessary  Status post bulking 11/22, no improvement  UDS negative, no sling indicated      OAB:  improved with pessary       Bernardino Maya MD  "

## 2023-12-13 ENCOUNTER — HOSPITAL ENCOUNTER (OUTPATIENT)
Facility: HOSPITAL | Age: 78
Discharge: HOME | End: 2023-12-14
Attending: STUDENT IN AN ORGANIZED HEALTH CARE EDUCATION/TRAINING PROGRAM | Admitting: STUDENT IN AN ORGANIZED HEALTH CARE EDUCATION/TRAINING PROGRAM
Payer: MEDICARE

## 2023-12-13 ENCOUNTER — PHARMACY VISIT (OUTPATIENT)
Dept: PHARMACY | Facility: CLINIC | Age: 78
End: 2023-12-13
Payer: MEDICARE

## 2023-12-13 ENCOUNTER — ANESTHESIA (OUTPATIENT)
Dept: OPERATING ROOM | Facility: HOSPITAL | Age: 78
End: 2023-12-13
Payer: MEDICARE

## 2023-12-13 DIAGNOSIS — Z98.890 S/P SACROCOLPOPEXY: Primary | ICD-10-CM

## 2023-12-13 DIAGNOSIS — N81.10 FEMALE BLADDER PROLAPSE: ICD-10-CM

## 2023-12-13 PROBLEM — R11.2 POSTOPERATIVE NAUSEA AND VOMITING: Status: ACTIVE | Noted: 2023-12-13

## 2023-12-13 LAB — GLUCOSE BLD MANUAL STRIP-MCNC: 106 MG/DL (ref 74–99)

## 2023-12-13 PROCEDURE — 7100000001 HC RECOVERY ROOM TIME - INITIAL BASE CHARGE: Performed by: STUDENT IN AN ORGANIZED HEALTH CARE EDUCATION/TRAINING PROGRAM

## 2023-12-13 PROCEDURE — RXMED WILLOW AMBULATORY MEDICATION CHARGE

## 2023-12-13 PROCEDURE — 44005 FREEING OF BOWEL ADHESION: CPT | Performed by: STUDENT IN AN ORGANIZED HEALTH CARE EDUCATION/TRAINING PROGRAM

## 2023-12-13 PROCEDURE — 2500000001 HC RX 250 WO HCPCS SELF ADMINISTERED DRUGS (ALT 637 FOR MEDICARE OP): Performed by: STUDENT IN AN ORGANIZED HEALTH CARE EDUCATION/TRAINING PROGRAM

## 2023-12-13 PROCEDURE — 2500000005 HC RX 250 GENERAL PHARMACY W/O HCPCS: Performed by: STUDENT IN AN ORGANIZED HEALTH CARE EDUCATION/TRAINING PROGRAM

## 2023-12-13 PROCEDURE — 2780000003 HC OR 278 NO HCPCS: Performed by: STUDENT IN AN ORGANIZED HEALTH CARE EDUCATION/TRAINING PROGRAM

## 2023-12-13 PROCEDURE — 2500000005 HC RX 250 GENERAL PHARMACY W/O HCPCS: Performed by: NURSE ANESTHETIST, CERTIFIED REGISTERED

## 2023-12-13 PROCEDURE — 2720000007 HC OR 272 NO HCPCS: Performed by: STUDENT IN AN ORGANIZED HEALTH CARE EDUCATION/TRAINING PROGRAM

## 2023-12-13 PROCEDURE — 3600000009 HC OR TIME - EACH INCREMENTAL 1 MINUTE - PROCEDURE LEVEL FOUR: Performed by: STUDENT IN AN ORGANIZED HEALTH CARE EDUCATION/TRAINING PROGRAM

## 2023-12-13 PROCEDURE — 82947 ASSAY GLUCOSE BLOOD QUANT: CPT

## 2023-12-13 PROCEDURE — 3600000004 HC OR TIME - INITIAL BASE CHARGE - PROCEDURE LEVEL FOUR: Performed by: STUDENT IN AN ORGANIZED HEALTH CARE EDUCATION/TRAINING PROGRAM

## 2023-12-13 PROCEDURE — 2500000004 HC RX 250 GENERAL PHARMACY W/ HCPCS (ALT 636 FOR OP/ED): Performed by: STUDENT IN AN ORGANIZED HEALTH CARE EDUCATION/TRAINING PROGRAM

## 2023-12-13 PROCEDURE — 7100000002 HC RECOVERY ROOM TIME - EACH INCREMENTAL 1 MINUTE: Performed by: STUDENT IN AN ORGANIZED HEALTH CARE EDUCATION/TRAINING PROGRAM

## 2023-12-13 PROCEDURE — 3700000002 HC GENERAL ANESTHESIA TIME - EACH INCREMENTAL 1 MINUTE: Performed by: STUDENT IN AN ORGANIZED HEALTH CARE EDUCATION/TRAINING PROGRAM

## 2023-12-13 PROCEDURE — 99024 POSTOP FOLLOW-UP VISIT: CPT | Performed by: STUDENT IN AN ORGANIZED HEALTH CARE EDUCATION/TRAINING PROGRAM

## 2023-12-13 PROCEDURE — 57425 LAPAROSCOPY SURG COLPOPEXY: CPT | Performed by: STUDENT IN AN ORGANIZED HEALTH CARE EDUCATION/TRAINING PROGRAM

## 2023-12-13 PROCEDURE — 3700000001 HC GENERAL ANESTHESIA TIME - INITIAL BASE CHARGE: Performed by: STUDENT IN AN ORGANIZED HEALTH CARE EDUCATION/TRAINING PROGRAM

## 2023-12-13 PROCEDURE — 2500000004 HC RX 250 GENERAL PHARMACY W/ HCPCS (ALT 636 FOR OP/ED): Performed by: NURSE ANESTHETIST, CERTIFIED REGISTERED

## 2023-12-13 PROCEDURE — G0378 HOSPITAL OBSERVATION PER HR: HCPCS

## 2023-12-13 PROCEDURE — 2500000004 HC RX 250 GENERAL PHARMACY W/ HCPCS (ALT 636 FOR OP/ED): Performed by: ANESTHESIOLOGY

## 2023-12-13 PROCEDURE — 96372 THER/PROPH/DIAG INJ SC/IM: CPT | Performed by: STUDENT IN AN ORGANIZED HEALTH CARE EDUCATION/TRAINING PROGRAM

## 2023-12-13 PROCEDURE — C1781 MESH (IMPLANTABLE): HCPCS | Performed by: STUDENT IN AN ORGANIZED HEALTH CARE EDUCATION/TRAINING PROGRAM

## 2023-12-13 DEVICE — VERTESSA LITE Y 26X4X3CM POLYPROPYLENE MESH FOR SACROCOLPOPEXY
Type: IMPLANTABLE DEVICE | Site: PELVIS | Status: FUNCTIONAL
Brand: VERTESSA LITE Y-MESH 26X4X3CM

## 2023-12-13 RX ORDER — DEXTROSE, SODIUM CHLORIDE, SODIUM LACTATE, POTASSIUM CHLORIDE, AND CALCIUM CHLORIDE 5; .6; .31; .03; .02 G/100ML; G/100ML; G/100ML; G/100ML; G/100ML
100 INJECTION, SOLUTION INTRAVENOUS CONTINUOUS
Status: ACTIVE | OUTPATIENT
Start: 2023-12-13 | End: 2023-12-14

## 2023-12-13 RX ORDER — ACETAMINOPHEN 325 MG/1
975 TABLET ORAL EVERY 6 HOURS
Status: DISCONTINUED | OUTPATIENT
Start: 2023-12-13 | End: 2023-12-14 | Stop reason: HOSPADM

## 2023-12-13 RX ORDER — MAGNESIUM HYDROXIDE 2400 MG/10ML
10 SUSPENSION ORAL DAILY PRN
Status: DISCONTINUED | OUTPATIENT
Start: 2023-12-13 | End: 2023-12-14 | Stop reason: HOSPADM

## 2023-12-13 RX ORDER — LIDOCAINE HYDROCHLORIDE 20 MG/ML
INJECTION, SOLUTION INFILTRATION; PERINEURAL AS NEEDED
Status: DISCONTINUED | OUTPATIENT
Start: 2023-12-13 | End: 2023-12-13

## 2023-12-13 RX ORDER — LEVOTHYROXINE SODIUM 112 UG/1
112 TABLET ORAL DAILY
Status: DISCONTINUED | OUTPATIENT
Start: 2023-12-14 | End: 2023-12-14 | Stop reason: HOSPADM

## 2023-12-13 RX ORDER — TRAMADOL HYDROCHLORIDE 50 MG/1
50 TABLET ORAL EVERY 8 HOURS PRN
Qty: 10 TABLET | Refills: 0 | Status: SHIPPED | OUTPATIENT
Start: 2023-12-13 | End: 2023-12-17

## 2023-12-13 RX ORDER — ONDANSETRON HYDROCHLORIDE 2 MG/ML
4 INJECTION, SOLUTION INTRAVENOUS ONCE AS NEEDED
Status: COMPLETED | OUTPATIENT
Start: 2023-12-13 | End: 2023-12-13

## 2023-12-13 RX ORDER — BUPIVACAINE HYDROCHLORIDE 2.5 MG/ML
INJECTION, SOLUTION INFILTRATION; PERINEURAL AS NEEDED
Status: DISCONTINUED | OUTPATIENT
Start: 2023-12-13 | End: 2023-12-13 | Stop reason: HOSPADM

## 2023-12-13 RX ORDER — ALBUTEROL SULFATE 90 UG/1
2 AEROSOL, METERED RESPIRATORY (INHALATION) EVERY 6 HOURS PRN
Status: DISCONTINUED | OUTPATIENT
Start: 2023-12-13 | End: 2023-12-14 | Stop reason: HOSPADM

## 2023-12-13 RX ORDER — ROCURONIUM BROMIDE 10 MG/ML
INJECTION, SOLUTION INTRAVENOUS AS NEEDED
Status: DISCONTINUED | OUTPATIENT
Start: 2023-12-13 | End: 2023-12-13

## 2023-12-13 RX ORDER — FAMOTIDINE 10 MG/ML
20 INJECTION INTRAVENOUS ONCE
Status: COMPLETED | OUTPATIENT
Start: 2023-12-13 | End: 2023-12-13

## 2023-12-13 RX ORDER — POLYETHYLENE GLYCOL 3350 17 G/17G
17 POWDER, FOR SOLUTION ORAL DAILY
Qty: 510 G | Refills: 0 | Status: SHIPPED | OUTPATIENT
Start: 2023-12-13 | End: 2024-04-30 | Stop reason: WASHOUT

## 2023-12-13 RX ORDER — ACETAMINOPHEN 500 MG
1000 TABLET ORAL EVERY 6 HOURS PRN
Qty: 30 TABLET | Refills: 0 | Status: SHIPPED | OUTPATIENT
Start: 2023-12-13 | End: 2023-12-20

## 2023-12-13 RX ORDER — ENOXAPARIN SODIUM 100 MG/ML
40 INJECTION SUBCUTANEOUS ONCE
Status: COMPLETED | OUTPATIENT
Start: 2023-12-13 | End: 2023-12-13

## 2023-12-13 RX ORDER — SCOLOPAMINE TRANSDERMAL SYSTEM 1 MG/1
1 PATCH, EXTENDED RELEASE TRANSDERMAL ONCE
Status: DISCONTINUED | OUTPATIENT
Start: 2023-12-13 | End: 2023-12-14 | Stop reason: HOSPADM

## 2023-12-13 RX ORDER — SODIUM CHLORIDE, SODIUM LACTATE, POTASSIUM CHLORIDE, CALCIUM CHLORIDE 600; 310; 30; 20 MG/100ML; MG/100ML; MG/100ML; MG/100ML
100 INJECTION, SOLUTION INTRAVENOUS CONTINUOUS
Status: DISCONTINUED | OUTPATIENT
Start: 2023-12-13 | End: 2023-12-14

## 2023-12-13 RX ORDER — LABETALOL HYDROCHLORIDE 5 MG/ML
5 INJECTION, SOLUTION INTRAVENOUS ONCE AS NEEDED
Status: DISCONTINUED | OUTPATIENT
Start: 2023-12-13 | End: 2023-12-13 | Stop reason: HOSPADM

## 2023-12-13 RX ORDER — KETOROLAC TROMETHAMINE 10 MG/1
10 TABLET, FILM COATED ORAL EVERY 6 HOURS PRN
Qty: 20 TABLET | Refills: 0 | Status: SHIPPED | OUTPATIENT
Start: 2023-12-13 | End: 2023-12-18

## 2023-12-13 RX ORDER — SODIUM CHLORIDE, SODIUM LACTATE, POTASSIUM CHLORIDE, CALCIUM CHLORIDE 600; 310; 30; 20 MG/100ML; MG/100ML; MG/100ML; MG/100ML
100 INJECTION, SOLUTION INTRAVENOUS CONTINUOUS
Status: DISCONTINUED | OUTPATIENT
Start: 2023-12-13 | End: 2023-12-13 | Stop reason: HOSPADM

## 2023-12-13 RX ORDER — SENNOSIDES 8.6 MG/1
1 TABLET ORAL DAILY
Qty: 30 TABLET | Refills: 0 | Status: SHIPPED | OUTPATIENT
Start: 2023-12-13 | End: 2024-01-12

## 2023-12-13 RX ORDER — POLYETHYLENE GLYCOL 3350 17 G/17G
17 POWDER, FOR SOLUTION ORAL DAILY
Status: DISCONTINUED | OUTPATIENT
Start: 2023-12-14 | End: 2023-12-14 | Stop reason: HOSPADM

## 2023-12-13 RX ORDER — OXYCODONE HYDROCHLORIDE 5 MG/1
5 TABLET ORAL EVERY 6 HOURS PRN
Status: DISCONTINUED | OUTPATIENT
Start: 2023-12-13 | End: 2023-12-14 | Stop reason: HOSPADM

## 2023-12-13 RX ORDER — DROPERIDOL 2.5 MG/ML
0.62 INJECTION, SOLUTION INTRAMUSCULAR; INTRAVENOUS ONCE AS NEEDED
Status: DISCONTINUED | OUTPATIENT
Start: 2023-12-13 | End: 2023-12-13 | Stop reason: HOSPADM

## 2023-12-13 RX ORDER — KETOROLAC TROMETHAMINE 30 MG/ML
15 INJECTION, SOLUTION INTRAMUSCULAR; INTRAVENOUS EVERY 6 HOURS PRN
Status: DISCONTINUED | OUTPATIENT
Start: 2023-12-13 | End: 2023-12-14 | Stop reason: HOSPADM

## 2023-12-13 RX ORDER — ALBUTEROL SULFATE 0.83 MG/ML
2.5 SOLUTION RESPIRATORY (INHALATION) ONCE AS NEEDED
Status: DISCONTINUED | OUTPATIENT
Start: 2023-12-13 | End: 2023-12-13 | Stop reason: HOSPADM

## 2023-12-13 RX ORDER — METOCLOPRAMIDE 10 MG/1
10 TABLET ORAL EVERY 6 HOURS PRN
Status: DISCONTINUED | OUTPATIENT
Start: 2023-12-13 | End: 2023-12-14 | Stop reason: HOSPADM

## 2023-12-13 RX ORDER — TAMSULOSIN HYDROCHLORIDE 0.4 MG/1
0.4 CAPSULE ORAL DAILY
Status: DISCONTINUED | OUTPATIENT
Start: 2023-12-14 | End: 2023-12-14 | Stop reason: HOSPADM

## 2023-12-13 RX ORDER — METOCLOPRAMIDE HYDROCHLORIDE 5 MG/ML
10 INJECTION INTRAMUSCULAR; INTRAVENOUS EVERY 6 HOURS PRN
Status: DISCONTINUED | OUTPATIENT
Start: 2023-12-13 | End: 2023-12-14 | Stop reason: HOSPADM

## 2023-12-13 RX ORDER — ONDANSETRON HYDROCHLORIDE 2 MG/ML
INJECTION, SOLUTION INTRAVENOUS AS NEEDED
Status: DISCONTINUED | OUTPATIENT
Start: 2023-12-13 | End: 2023-12-13

## 2023-12-13 RX ORDER — MORPHINE SULFATE 2 MG/ML
2 INJECTION, SOLUTION INTRAMUSCULAR; INTRAVENOUS EVERY 5 MIN PRN
Status: DISCONTINUED | OUTPATIENT
Start: 2023-12-13 | End: 2023-12-13 | Stop reason: HOSPADM

## 2023-12-13 RX ORDER — ACETAMINOPHEN AND PHENYLEPHRINE HCL 325; 5 MG/1; MG/1
1 TABLET ORAL DAILY
COMMUNITY

## 2023-12-13 RX ORDER — CHOLECALCIFEROL (VITAMIN D3) 25 MCG
2000 TABLET ORAL DAILY
Status: DISCONTINUED | OUTPATIENT
Start: 2023-12-14 | End: 2023-12-14 | Stop reason: HOSPADM

## 2023-12-13 RX ORDER — ACETAMINOPHEN 325 MG/1
1000 TABLET ORAL ONCE
Status: DISCONTINUED | OUTPATIENT
Start: 2023-12-13 | End: 2023-12-13 | Stop reason: HOSPADM

## 2023-12-13 RX ORDER — PROPOFOL 10 MG/ML
INJECTION, EMULSION INTRAVENOUS AS NEEDED
Status: DISCONTINUED | OUTPATIENT
Start: 2023-12-13 | End: 2023-12-13

## 2023-12-13 RX ORDER — HYDRALAZINE HYDROCHLORIDE 20 MG/ML
5 INJECTION INTRAMUSCULAR; INTRAVENOUS EVERY 30 MIN PRN
Status: DISCONTINUED | OUTPATIENT
Start: 2023-12-13 | End: 2023-12-13 | Stop reason: HOSPADM

## 2023-12-13 RX ORDER — LIDOCAINE HYDROCHLORIDE 10 MG/ML
0.1 INJECTION, SOLUTION EPIDURAL; INFILTRATION; INTRACAUDAL; PERINEURAL ONCE
Status: DISCONTINUED | OUTPATIENT
Start: 2023-12-13 | End: 2023-12-13 | Stop reason: HOSPADM

## 2023-12-13 RX ORDER — FUROSEMIDE 10 MG/ML
INJECTION INTRAMUSCULAR; INTRAVENOUS AS NEEDED
Status: DISCONTINUED | OUTPATIENT
Start: 2023-12-13 | End: 2023-12-13

## 2023-12-13 RX ORDER — LORAZEPAM 2 MG/ML
1 INJECTION INTRAMUSCULAR ONCE
Status: DISCONTINUED | OUTPATIENT
Start: 2023-12-13 | End: 2023-12-13 | Stop reason: HOSPADM

## 2023-12-13 RX ORDER — CEFAZOLIN SODIUM 2 G/100ML
2 INJECTION, SOLUTION INTRAVENOUS EVERY 4 HOURS
Status: DISCONTINUED | OUTPATIENT
Start: 2023-12-13 | End: 2023-12-13

## 2023-12-13 RX ORDER — ACETAMINOPHEN 650 MG/1
650 SUPPOSITORY RECTAL EVERY 6 HOURS
Status: DISCONTINUED | OUTPATIENT
Start: 2023-12-13 | End: 2023-12-14 | Stop reason: HOSPADM

## 2023-12-13 RX ORDER — SENNOSIDES 8.6 MG/1
1 TABLET ORAL NIGHTLY
Status: DISCONTINUED | OUTPATIENT
Start: 2023-12-13 | End: 2023-12-14 | Stop reason: HOSPADM

## 2023-12-13 RX ORDER — FENTANYL CITRATE 50 UG/ML
INJECTION, SOLUTION INTRAMUSCULAR; INTRAVENOUS AS NEEDED
Status: DISCONTINUED | OUTPATIENT
Start: 2023-12-13 | End: 2023-12-13

## 2023-12-13 RX ORDER — DIPHENHYDRAMINE HYDROCHLORIDE 50 MG/ML
12.5 INJECTION INTRAMUSCULAR; INTRAVENOUS ONCE AS NEEDED
Status: DISCONTINUED | OUTPATIENT
Start: 2023-12-13 | End: 2023-12-13 | Stop reason: HOSPADM

## 2023-12-13 RX ORDER — NORETHINDRONE AND ETHINYL ESTRADIOL 0.5-0.035
KIT ORAL AS NEEDED
Status: DISCONTINUED | OUTPATIENT
Start: 2023-12-13 | End: 2023-12-13

## 2023-12-13 RX ADMIN — EPHEDRINE SULFATE 15 MG: 50 INJECTION, SOLUTION INTRAVENOUS at 09:55

## 2023-12-13 RX ADMIN — FENTANYL CITRATE 100 MCG: 50 INJECTION, SOLUTION INTRAMUSCULAR; INTRAVENOUS at 10:13

## 2023-12-13 RX ADMIN — ROCURONIUM BROMIDE 10 MG: 10 INJECTION, SOLUTION INTRAVENOUS at 10:30

## 2023-12-13 RX ADMIN — ONDANSETRON 4 MG: 2 INJECTION, SOLUTION INTRAMUSCULAR; INTRAVENOUS at 12:03

## 2023-12-13 RX ADMIN — SODIUM CHLORIDE, SODIUM LACTATE, POTASSIUM CHLORIDE, CALCIUM CHLORIDE AND DEXTROSE MONOHYDRATE 100 ML/HR: 5; 600; 310; 30; 20 INJECTION, SOLUTION INTRAVENOUS at 21:42

## 2023-12-13 RX ADMIN — CEFAZOLIN SODIUM 2 G: 2 INJECTION, SOLUTION INTRAVENOUS at 09:15

## 2023-12-13 RX ADMIN — HYDROMORPHONE HYDROCHLORIDE 0.5 MG: 0.5 INJECTION, SOLUTION INTRAMUSCULAR; INTRAVENOUS; SUBCUTANEOUS at 13:04

## 2023-12-13 RX ADMIN — SUGAMMADEX 200 MG: 100 INJECTION, SOLUTION INTRAVENOUS at 12:08

## 2023-12-13 RX ADMIN — SODIUM CHLORIDE, SODIUM LACTATE, POTASSIUM CHLORIDE, AND CALCIUM CHLORIDE: 600; 310; 30; 20 INJECTION, SOLUTION INTRAVENOUS at 09:12

## 2023-12-13 RX ADMIN — SCOPALAMINE 1 PATCH: 1 PATCH, EXTENDED RELEASE TRANSDERMAL at 21:12

## 2023-12-13 RX ADMIN — FUROSEMIDE 20 MG: 10 INJECTION, SOLUTION INTRAMUSCULAR; INTRAVENOUS at 11:49

## 2023-12-13 RX ADMIN — HYDROMORPHONE HYDROCHLORIDE 0.5 MG: 0.5 INJECTION, SOLUTION INTRAMUSCULAR; INTRAVENOUS; SUBCUTANEOUS at 13:13

## 2023-12-13 RX ADMIN — FENTANYL CITRATE 100 MCG: 50 INJECTION, SOLUTION INTRAMUSCULAR; INTRAVENOUS at 09:17

## 2023-12-13 RX ADMIN — EPHEDRINE SULFATE 15 MG: 50 INJECTION, SOLUTION INTRAVENOUS at 09:51

## 2023-12-13 RX ADMIN — OXYCODONE HYDROCHLORIDE 5 MG: 5 TABLET ORAL at 21:42

## 2023-12-13 RX ADMIN — HYDROMORPHONE HYDROCHLORIDE 0.5 MG: 0.5 INJECTION, SOLUTION INTRAMUSCULAR; INTRAVENOUS; SUBCUTANEOUS at 13:23

## 2023-12-13 RX ADMIN — ROCURONIUM BROMIDE 40 MG: 10 INJECTION, SOLUTION INTRAVENOUS at 09:17

## 2023-12-13 RX ADMIN — ENOXAPARIN SODIUM 40 MG: 40 INJECTION SUBCUTANEOUS at 20:15

## 2023-12-13 RX ADMIN — EPHEDRINE SULFATE 10 MG: 50 INJECTION, SOLUTION INTRAVENOUS at 09:42

## 2023-12-13 RX ADMIN — FAMOTIDINE 20 MG: 10 INJECTION, SOLUTION INTRAVENOUS at 08:19

## 2023-12-13 RX ADMIN — KETOROLAC TROMETHAMINE 15 MG: 30 INJECTION, SOLUTION INTRAMUSCULAR at 23:40

## 2023-12-13 RX ADMIN — ONDANSETRON 4 MG: 2 INJECTION INTRAMUSCULAR; INTRAVENOUS at 16:11

## 2023-12-13 RX ADMIN — HYDROMORPHONE HYDROCHLORIDE 0.5 MG: 0.5 INJECTION, SOLUTION INTRAMUSCULAR; INTRAVENOUS; SUBCUTANEOUS at 12:55

## 2023-12-13 RX ADMIN — HYDROMORPHONE HYDROCHLORIDE 0.5 MG: 0.5 INJECTION, SOLUTION INTRAMUSCULAR; INTRAVENOUS; SUBCUTANEOUS at 12:45

## 2023-12-13 RX ADMIN — LIDOCAINE HYDROCHLORIDE 50 MG: 20 INJECTION, SOLUTION INFILTRATION; PERINEURAL at 09:17

## 2023-12-13 RX ADMIN — SENNOSIDES 8.6 MG: 8.6 TABLET, FILM COATED ORAL at 21:42

## 2023-12-13 RX ADMIN — SODIUM CHLORIDE, SODIUM LACTATE, POTASSIUM CHLORIDE, AND CALCIUM CHLORIDE: 600; 310; 30; 20 INJECTION, SOLUTION INTRAVENOUS at 09:29

## 2023-12-13 RX ADMIN — SODIUM CHLORIDE, POTASSIUM CHLORIDE, SODIUM LACTATE AND CALCIUM CHLORIDE 100 ML/HR: 600; 310; 30; 20 INJECTION, SOLUTION INTRAVENOUS at 08:19

## 2023-12-13 RX ADMIN — ACETAMINOPHEN 975 MG: 325 TABLET ORAL at 21:42

## 2023-12-13 RX ADMIN — PROPOFOL 150 MG: 10 INJECTION, EMULSION INTRAVENOUS at 09:17

## 2023-12-13 SDOH — SOCIAL STABILITY: SOCIAL INSECURITY: HAVE YOU HAD THOUGHTS OF HARMING ANYONE ELSE?: YES

## 2023-12-13 SDOH — ECONOMIC STABILITY: INCOME INSECURITY: HOW HARD IS IT FOR YOU TO PAY FOR THE VERY BASICS LIKE FOOD, HOUSING, MEDICAL CARE, AND HEATING?: NOT HARD AT ALL

## 2023-12-13 SDOH — ECONOMIC STABILITY: TRANSPORTATION INSECURITY
IN THE PAST 12 MONTHS, HAS LACK OF TRANSPORTATION KEPT YOU FROM MEETINGS, WORK, OR FROM GETTING THINGS NEEDED FOR DAILY LIVING?: NO

## 2023-12-13 SDOH — HEALTH STABILITY: PHYSICAL HEALTH: ON AVERAGE, HOW MANY DAYS PER WEEK DO YOU ENGAGE IN MODERATE TO STRENUOUS EXERCISE (LIKE A BRISK WALK)?: 4 DAYS

## 2023-12-13 SDOH — ECONOMIC STABILITY: TRANSPORTATION INSECURITY
IN THE PAST 12 MONTHS, HAS THE LACK OF TRANSPORTATION KEPT YOU FROM MEDICAL APPOINTMENTS OR FROM GETTING MEDICATIONS?: NO

## 2023-12-13 SDOH — SOCIAL STABILITY: SOCIAL INSECURITY: HAS ANYONE EVER THREATENED TO HURT YOUR FAMILY OR YOUR PETS?: NO

## 2023-12-13 SDOH — SOCIAL STABILITY: SOCIAL INSECURITY: ARE THERE ANY APPARENT SIGNS OF INJURIES/BEHAVIORS THAT COULD BE RELATED TO ABUSE/NEGLECT?: NO

## 2023-12-13 SDOH — ECONOMIC STABILITY: INCOME INSECURITY: IN THE PAST 12 MONTHS, HAS THE ELECTRIC, GAS, OIL, OR WATER COMPANY THREATENED TO SHUT OFF SERVICE IN YOUR HOME?: NO

## 2023-12-13 SDOH — ECONOMIC STABILITY: INCOME INSECURITY: IN THE LAST 12 MONTHS, WAS THERE A TIME WHEN YOU WERE NOT ABLE TO PAY THE MORTGAGE OR RENT ON TIME?: NO

## 2023-12-13 SDOH — SOCIAL STABILITY: SOCIAL INSECURITY: DO YOU FEEL UNSAFE GOING BACK TO THE PLACE WHERE YOU ARE LIVING?: NO

## 2023-12-13 SDOH — ECONOMIC STABILITY: HOUSING INSECURITY: IN THE LAST 12 MONTHS, HOW MANY PLACES HAVE YOU LIVED?: 1

## 2023-12-13 SDOH — SOCIAL STABILITY: SOCIAL INSECURITY: DOES ANYONE TRY TO KEEP YOU FROM HAVING/CONTACTING OTHER FRIENDS OR DOING THINGS OUTSIDE YOUR HOME?: NO

## 2023-12-13 SDOH — HEALTH STABILITY: MENTAL HEALTH: CURRENT SMOKER: 0

## 2023-12-13 SDOH — SOCIAL STABILITY: SOCIAL INSECURITY: ARE YOU OR HAVE YOU BEEN THREATENED OR ABUSED PHYSICALLY, EMOTIONALLY, OR SEXUALLY BY ANYONE?: NO

## 2023-12-13 SDOH — ECONOMIC STABILITY: HOUSING INSECURITY
IN THE LAST 12 MONTHS, WAS THERE A TIME WHEN YOU DID NOT HAVE A STEADY PLACE TO SLEEP OR SLEPT IN A SHELTER (INCLUDING NOW)?: NO

## 2023-12-13 SDOH — SOCIAL STABILITY: SOCIAL INSECURITY: DO YOU FEEL ANYONE HAS EXPLOITED OR TAKEN ADVANTAGE OF YOU FINANCIALLY OR OF YOUR PERSONAL PROPERTY?: NO

## 2023-12-13 SDOH — SOCIAL STABILITY: SOCIAL INSECURITY: WERE YOU ABLE TO COMPLETE ALL THE BEHAVIORAL HEALTH SCREENINGS?: YES

## 2023-12-13 SDOH — SOCIAL STABILITY: SOCIAL INSECURITY: ABUSE: ADULT

## 2023-12-13 SDOH — HEALTH STABILITY: PHYSICAL HEALTH: ON AVERAGE, HOW MANY MINUTES DO YOU ENGAGE IN EXERCISE AT THIS LEVEL?: 30 MIN

## 2023-12-13 ASSESSMENT — PAIN SCALES - GENERAL
PAINLEVEL_OUTOF10: 4
PAINLEVEL_OUTOF10: 0 - NO PAIN
PAINLEVEL_OUTOF10: 10 - WORST POSSIBLE PAIN
PAINLEVEL_OUTOF10: 2
PAINLEVEL_OUTOF10: 10 - WORST POSSIBLE PAIN
PAINLEVEL_OUTOF10: 4
PAINLEVEL_OUTOF10: 10 - WORST POSSIBLE PAIN
PAINLEVEL_OUTOF10: 9
PAINLEVEL_OUTOF10: 0 - NO PAIN
PAIN_LEVEL: 2
PAINLEVEL_OUTOF10: 10 - WORST POSSIBLE PAIN
PAINLEVEL_OUTOF10: 4
PAINLEVEL_OUTOF10: 8
PAINLEVEL_OUTOF10: 4
PAINLEVEL_OUTOF10: 0 - NO PAIN
PAINLEVEL_OUTOF10: 3
PAINLEVEL_OUTOF10: 5 - MODERATE PAIN
PAINLEVEL_OUTOF10: 0 - NO PAIN

## 2023-12-13 ASSESSMENT — LIFESTYLE VARIABLES
HOW OFTEN DO YOU HAVE 6 OR MORE DRINKS ON ONE OCCASION: NEVER
SKIP TO QUESTIONS 9-10: 1
AUDIT-C TOTAL SCORE: 0
HOW MANY STANDARD DRINKS CONTAINING ALCOHOL DO YOU HAVE ON A TYPICAL DAY: PATIENT DOES NOT DRINK
AUDIT-C TOTAL SCORE: 0
HOW OFTEN DO YOU HAVE A DRINK CONTAINING ALCOHOL: NEVER

## 2023-12-13 ASSESSMENT — PATIENT HEALTH QUESTIONNAIRE - PHQ9
1. LITTLE INTEREST OR PLEASURE IN DOING THINGS: NOT AT ALL
SUM OF ALL RESPONSES TO PHQ9 QUESTIONS 1 & 2: 0
2. FEELING DOWN, DEPRESSED OR HOPELESS: NOT AT ALL

## 2023-12-13 ASSESSMENT — PAIN - FUNCTIONAL ASSESSMENT
PAIN_FUNCTIONAL_ASSESSMENT: 0-10

## 2023-12-13 ASSESSMENT — ACTIVITIES OF DAILY LIVING (ADL)
LACK_OF_TRANSPORTATION: NO
HEARING - LEFT EAR: FUNCTIONAL
HEARING - RIGHT EAR: FUNCTIONAL
DRESSING YOURSELF: INDEPENDENT
PATIENT'S MEMORY ADEQUATE TO SAFELY COMPLETE DAILY ACTIVITIES?: YES
WALKS IN HOME: INDEPENDENT
BATHING: INDEPENDENT
FEEDING YOURSELF: INDEPENDENT
TOILETING: INDEPENDENT
ADEQUATE_TO_COMPLETE_ADL: YES
GROOMING: INDEPENDENT
JUDGMENT_ADEQUATE_SAFELY_COMPLETE_DAILY_ACTIVITIES: YES

## 2023-12-13 ASSESSMENT — COGNITIVE AND FUNCTIONAL STATUS - GENERAL
STANDING UP FROM CHAIR USING ARMS: A LITTLE
WALKING IN HOSPITAL ROOM: A LITTLE
HELP NEEDED FOR BATHING: A LITTLE
DAILY ACTIVITIY SCORE: 21
DRESSING REGULAR LOWER BODY CLOTHING: A LITTLE
DRESSING REGULAR UPPER BODY CLOTHING: A LITTLE
MOVING TO AND FROM BED TO CHAIR: A LITTLE
PATIENT BASELINE BEDBOUND: NO
MOBILITY SCORE: 20
CLIMB 3 TO 5 STEPS WITH RAILING: A LITTLE

## 2023-12-13 ASSESSMENT — COLUMBIA-SUICIDE SEVERITY RATING SCALE - C-SSRS
6. HAVE YOU EVER DONE ANYTHING, STARTED TO DO ANYTHING, OR PREPARED TO DO ANYTHING TO END YOUR LIFE?: NO
2. HAVE YOU ACTUALLY HAD ANY THOUGHTS OF KILLING YOURSELF?: NO
1. IN THE PAST MONTH, HAVE YOU WISHED YOU WERE DEAD OR WISHED YOU COULD GO TO SLEEP AND NOT WAKE UP?: NO

## 2023-12-13 ASSESSMENT — PAIN DESCRIPTION - ORIENTATION
ORIENTATION: LOWER

## 2023-12-13 ASSESSMENT — PAIN DESCRIPTION - LOCATION
LOCATION: ABDOMEN

## 2023-12-13 NOTE — OP NOTE
Laparoscopic Sacrocolpopexy Operative Note     Date: 2023  OR Location: [facility]    Name: Jie Crocker, : 1945, Age: 78 y.o., MRN: [id number], Sex: female    Diagnosis  Pre-op Diagnosis     * Mixed incontinence [N39.46]     * Pelvic organ prolapse quantification stage 2 cystocele [N81.10] Post-op Diagnosis     * Mixed incontinence [N39.46]     Procedures  Laparoscopic Sacrocolpopexy  36076 - AL LAPAROSCOPY COLPOPEXY SUSPENSION VAGINAL APEX  Lysis of adhesions     Surgeons      * Bernardino Maya - Primary    Resident/Fellow/Other Assistant:  Surgeon(s) and Role:    Procedure Summary  Anesthesia: General  ASA: III  Anesthesia Staff: CRNA: VITALIY Watts-CRNA  Estimated Blood Loss: 10 mL  Intra-op Medications:   Medication Name Total Dose   ceFAZolin in dextrose (iso-os) (Ancef) IVPB 2 g 2 g              Anesthesia Record               Intraprocedure I/O Totals          Intake    LR 1700.00 mL    ceFAZolin in dextrose (iso-os) (Ancef) IVPB 2 g 100.00 mL    Total Intake 1800 mL       Output    Urine 250 mL    Est. Blood Loss 10 mL    Total Output 260 mL       Net    Net Volume 1540 mL          Specimen: No specimens collected     Staff:   Circulator: Cris Kinney RN; Ella Tinoco RN  Scrub Person: Tova Jerez; Norah Cunningham RN         Drains and/or Catheters:   Urethral Catheter Non-latex 16 Fr. (Active)       Tourniquet Times:         Implants:  Implants       Type Name Action Serial No.      Surgical Mesh Sling Implant MESH, Y, VERTESSA LITE 26 X 4 X 3CM - [id number] Implanted               Findings: adhesions of small bowel to the right and left pelvic side-walls     Indications: Jie Crocker is an 78 y.o. female who is having surgery for Mixed incontinence [N39.46]. \    The patient was seen in the preoperative area. The risks, benefits, complications, treatment options, non-operative alternatives, expected recovery and outcomes were discussed with the patient. The possibilities of reaction to  medication, pulmonary aspiration, injury to surrounding structures, bleeding, recurrent infection, the need for additional procedures, failure to diagnose a condition, and creating a complication requiring transfusion or operation were discussed with the patient. The patient concurred with the proposed plan, giving informed consent.  The site of surgery was properly noted/marked if necessary per policy. The patient has been actively warmed in preoperative area. Preoperative antibiotics are not indicated. Venous thrombosis prophylaxis are not indicated.    Procedure Details: Patient was taken to the operating room, prepped  and draped in usual sterile fashion after being placed in dorsal lithotomy. A Juárez catheter was placed in the bladder, and an EA sizer was placed in the vagina.  The abdomen was entered through the umbilicus using the open Neo technique and a 12 mm balloon port was inserted.  The abdomen  was insufflated with carbon dioxide gas to 15 mmHg of carbon dioxide gas. Two 5 mm ports were placed in the right and left lower quadrants and a 12 mm port was placed in the suprapubic site. All incisions were first injected with 1% lidocaine with epinephrine. We then used a combination of blunt and sharp dissection to take down the extensive adhesions of the bowel so that we may complete the procedure, this required an additional 50 minutes of OR time.     The bladder was  then dissected off the anterior vaginal wall in order to  create site for mesh attachment.   The sacral promontory was identified.  The  peritoneum over the sacral promontory was grasped and entered sharply.  Careful sharp and blunt dissection were then used to develop the  presacral ligament plane.  The middle sacral artery was identified,  cauterized, and transected.  The peritoneum was then dissected in a  caudal fashion toward the right uterosacral ligament in order to create  a space to peritonealize the mesh.  We then placed the  Upsylon mesh into  the abdomen and attached at the anterior and posterior cervical stumps  and vagina using multiple interrupted 0 PDS sutures.  The vaginal apex  was then tensioned appropriately with the C-point 8 cm proximal to the  hymen and the mesh was attached to the sacral ligament using 2  interrupted 0 Ethibond sutures.  The peritoneum was then closed entirely  over the mesh using a running 0 Vicryl stitch.  Once this was completed,  cystoscopy was performed and spill of urine was noted bilaterally from the ureteral orifices.  The bladder was then fully evaluated and found to not have any abnormalities The bladder was drained and the Juárez was  replaced.  The umbilical fascia was closed using 0-vicryl stich.  The skin incisions were all closed using a running 4-0  Vicryl.    Complications:  None; patient tolerated the procedure well.    Disposition: PACU - hemodynamically stable.  Condition: stable         Additional Details:     Attending Attestation: I was present and scrubbed for the entire procedure.    Bernardino Maya  Phone Number: [phone]

## 2023-12-13 NOTE — SIGNIFICANT EVENT
Pt awake. Drinking tea. Voiding trial performed per orders. Pt felt the need to void immediately after nicholas removed. Assisted up to BSC by RN. No dizziness  noted. Able to void 300ml clear yellow urine. Pt did get nauseated being up. 100ML of emesis noted. Pt medicated per orders for nausea/vomiting. Returned to lying in cart. Vss. No further distress noted. Pt asked to rest. Will continue to monitor.

## 2023-12-13 NOTE — SIGNIFICANT EVENT
Awake. Assisted up to bedside. Vomited again. Dr. Maya messaged. Patient concerned about discharge. Feeling weak with movement

## 2023-12-13 NOTE — ANESTHESIA POSTPROCEDURE EVALUATION
Patient: Jie Crocker    Procedure Summary       Date: 12/13/23 Room / Location: [facility] 08 / [facility]    Anesthesia Start: 0912 Anesthesia Stop: 1221    Procedure: Laparoscopic Sacrocolpopexy Diagnosis:       Mixed incontinence      (Mixed incontinence [N39.46])    Surgeons: Bernardino Maya MD Responsible Provider: FLORENTINO Watts    Anesthesia Type: general ASA Status: 3            Anesthesia Type: general    Vitals Value Taken Time   BP 95/70 12/13/23 1532   Temp 36.1 °C (97 °F) 12/13/23 1400   Pulse 68 12/13/23 1552   Resp 13 12/13/23 1553   SpO2 92 % 12/13/23 1553   Vitals shown include unvalidated device data.    Anesthesia Post Evaluation    Patient location during evaluation: PACU  Patient participation: complete - patient participated  Level of consciousness: awake  Pain score: 2  Pain management: adequate  Airway patency: patent  Cardiovascular status: acceptable  Respiratory status: acceptable  Hydration status: acceptable  Postoperative Nausea and Vomiting: mild        No notable events documented.

## 2023-12-13 NOTE — SIGNIFICANT EVENT
Report received from jenise HAWKINS. Pt resting in bed. Attempting to call report to [facility]. Unable to take report at this time.

## 2023-12-13 NOTE — DISCHARGE INSTRUCTIONS
Call Dr. Maya's office for any problems and/or concerns    *Walk as much as possible, it is ok to use stairs carefully  *Ok to shower, avoid soaking in tub baths or swimming for 4-6 weeks after surgery   *Continue the coughing and deep breathing exercises that your learned in the hospital  *No lifting/straining and avoid constipation for 4-6 weeks (Avoid strenuous activity)  *Prevent constipation by using stool softeners and staying hydrated, so that you do not strain against your stitches or have pain from constipation  *Vaginal rest for 6 weeks (Do not put anything in your vagina except prescribed vaginal estrogen. Do not use tampons or douches. Do not have sex until cleared by physician)    Call Doctor right away for:    *Fever above 100.4/shaking chills  *Bright red vaginal bleeding or bleeding that soaks more than one sanitary pad per hour  *A foul smelling discharge from the vagina  *Trouble urinating or burning with urination  *Severe pain or bloating in your abdomen  *Persistent nausea/vomiting  *Redness, swelling, or drainage at your incision sites  *Chest pain/shortness of breath-call 911.    - You will be going home with prescriptions for pain medication. If you are able to take them, alternate a dose of Acetaminophen (Tylenol) and Ibuprofen (Motrin) every 3 hours. (For example, 1000mg of Tylenol at 09:00am, 600mg ibuprofen at 12:00pm, 1000mg of Tylenol at 3:00pm, 600mg ibuprofen at 6:00pm).   - Use any prescribed narcotic (ie oxycodone) for breakthrough pain as prescribed.   - Use a stool softener, such as Miralax, to prevent constipation from the narcotic medication.   - If you are going home with or already have a prescription for estrogen cream, you may begin/resume use vaginally as prescribed nightly until your 2 week post-op visit.

## 2023-12-13 NOTE — SIGNIFICANT EVENT
Dr. Maya at bedside to see patient. Updated on procedure. Pt states her pain is improved but feels very tired right now. No distress noted.

## 2023-12-13 NOTE — SIGNIFICANT EVENT
Dr. Maya messaged back. Possible to admit patient for observation. Pt assisted up to bedside commode. Became nauseated and vomited again. Pt decided she wanted to stay overnight. Awaiting admit orders. Brother at bedside. Is going home. Will contact with room assignment when available

## 2023-12-13 NOTE — ANESTHESIA PREPROCEDURE EVALUATION
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   (+) Multilevel degenerative disc disease   (+) Osteoarthritis of right shoulder due to rotator cuff injury   (+) Spinal stenosis of lumbar region with neurogenic claudication       Clinical information reviewed:   Tobacco  Allergies  Meds  Problems  Med Hx  Surg Hx   Fam Hx  Soc   Hx        NPO Detail:  NPO/Void Status  Date of Last Liquid: 12/12/23  Time of Last Liquid: 1830  Date of Last Solid: 12/12/23  Time of Last Solid: 1830  Last Intake Type: Clear fluids         Physical Exam    Airway  Mallampati: III  TM distance: >3 FB     Cardiovascular - normal exam     Dental - normal exam     Pulmonary - normal exam     Abdominal - normal exam             Anesthesia Plan    ASA 3     general     The patient is not a current smoker.    intravenous induction   Postoperative administration of opioids is intended.  Anesthetic plan and risks discussed with patient.  Use of blood products discussed with patient who consented to blood products.    Plan discussed with CRNA.

## 2023-12-13 NOTE — SIGNIFICANT EVENT
Brother updated via phone. He states he is going to go home and I can call him with updates or ready for ride home

## 2023-12-14 VITALS
DIASTOLIC BLOOD PRESSURE: 70 MMHG | BODY MASS INDEX: 29.87 KG/M2 | OXYGEN SATURATION: 94 % | SYSTOLIC BLOOD PRESSURE: 112 MMHG | WEIGHT: 197.09 LBS | HEART RATE: 60 BPM | TEMPERATURE: 97.7 F | HEIGHT: 68 IN | RESPIRATION RATE: 16 BRPM

## 2023-12-14 LAB
ANION GAP SERPL CALC-SCNC: 12 MMOL/L (ref 10–20)
BUN SERPL-MCNC: 12 MG/DL (ref 6–23)
CALCIUM SERPL-MCNC: 8.3 MG/DL (ref 8.6–10.3)
CHLORIDE SERPL-SCNC: 105 MMOL/L (ref 98–107)
CO2 SERPL-SCNC: 26 MMOL/L (ref 21–32)
CREAT SERPL-MCNC: 0.78 MG/DL (ref 0.5–1.05)
ERYTHROCYTE [DISTWIDTH] IN BLOOD BY AUTOMATED COUNT: 12.9 % (ref 11.5–14.5)
GFR SERPL CREATININE-BSD FRML MDRD: 79 ML/MIN/1.73M*2
GLUCOSE SERPL-MCNC: 112 MG/DL (ref 74–99)
HCT VFR BLD AUTO: 38.3 % (ref 36–46)
HGB BLD-MCNC: 12.3 G/DL (ref 12–16)
MCH RBC QN AUTO: 28.3 PG (ref 26–34)
MCHC RBC AUTO-ENTMCNC: 32.1 G/DL (ref 32–36)
MCV RBC AUTO: 88 FL (ref 80–100)
NRBC BLD-RTO: 0 /100 WBCS (ref 0–0)
PLATELET # BLD AUTO: 193 X10*3/UL (ref 150–450)
POTASSIUM SERPL-SCNC: 3.6 MMOL/L (ref 3.5–5.3)
RBC # BLD AUTO: 4.35 X10*6/UL (ref 4–5.2)
SODIUM SERPL-SCNC: 139 MMOL/L (ref 136–145)
WBC # BLD AUTO: 9.7 X10*3/UL (ref 4.4–11.3)

## 2023-12-14 PROCEDURE — 80048 BASIC METABOLIC PNL TOTAL CA: CPT | Performed by: STUDENT IN AN ORGANIZED HEALTH CARE EDUCATION/TRAINING PROGRAM

## 2023-12-14 PROCEDURE — 2500000004 HC RX 250 GENERAL PHARMACY W/ HCPCS (ALT 636 FOR OP/ED): Mod: MUE | Performed by: STUDENT IN AN ORGANIZED HEALTH CARE EDUCATION/TRAINING PROGRAM

## 2023-12-14 PROCEDURE — G0378 HOSPITAL OBSERVATION PER HR: HCPCS

## 2023-12-14 PROCEDURE — 36415 COLL VENOUS BLD VENIPUNCTURE: CPT | Performed by: STUDENT IN AN ORGANIZED HEALTH CARE EDUCATION/TRAINING PROGRAM

## 2023-12-14 PROCEDURE — 2500000001 HC RX 250 WO HCPCS SELF ADMINISTERED DRUGS (ALT 637 FOR MEDICARE OP): Performed by: STUDENT IN AN ORGANIZED HEALTH CARE EDUCATION/TRAINING PROGRAM

## 2023-12-14 PROCEDURE — 7100000011 HC EXTENDED STAY RECOVERY HOURLY - NURSING UNIT

## 2023-12-14 PROCEDURE — 85027 COMPLETE CBC AUTOMATED: CPT | Performed by: STUDENT IN AN ORGANIZED HEALTH CARE EDUCATION/TRAINING PROGRAM

## 2023-12-14 RX ADMIN — ACETAMINOPHEN 975 MG: 325 TABLET ORAL at 08:59

## 2023-12-14 RX ADMIN — LEVOTHYROXINE SODIUM 112 MCG: 112 TABLET ORAL at 06:24

## 2023-12-14 RX ADMIN — POLYETHYLENE GLYCOL 3350 17 G: 17 POWDER, FOR SOLUTION ORAL at 09:00

## 2023-12-14 RX ADMIN — Medication 2000 UNITS: at 08:59

## 2023-12-14 RX ADMIN — ACETAMINOPHEN 975 MG: 325 TABLET ORAL at 03:16

## 2023-12-14 RX ADMIN — TAMSULOSIN HYDROCHLORIDE 0.4 MG: 0.4 CAPSULE ORAL at 08:59

## 2023-12-14 RX ADMIN — OXYCODONE HYDROCHLORIDE 5 MG: 5 TABLET ORAL at 08:59

## 2023-12-14 ASSESSMENT — PAIN SCALES - GENERAL: PAINLEVEL_OUTOF10: 4

## 2023-12-14 ASSESSMENT — PAIN DESCRIPTION - LOCATION: LOCATION: ABDOMEN

## 2023-12-14 ASSESSMENT — PAIN - FUNCTIONAL ASSESSMENT
PAIN_FUNCTIONAL_ASSESSMENT: 0-10
PAIN_FUNCTIONAL_ASSESSMENT: 0-10

## 2023-12-14 ASSESSMENT — COGNITIVE AND FUNCTIONAL STATUS - GENERAL
WALKING IN HOSPITAL ROOM: A LITTLE
TOILETING: A LITTLE
DAILY ACTIVITIY SCORE: 21
DRESSING REGULAR LOWER BODY CLOTHING: A LITTLE
MOVING TO AND FROM BED TO CHAIR: A LITTLE
HELP NEEDED FOR BATHING: A LITTLE
CLIMB 3 TO 5 STEPS WITH RAILING: A LOT
MOBILITY SCORE: 20

## 2023-12-14 ASSESSMENT — PAIN DESCRIPTION - ORIENTATION: ORIENTATION: MID

## 2023-12-14 NOTE — CARE PLAN
The patient's goals for the shift include    Problem: Skin  Goal: Participates in plan/prevention/treatment measures  Outcome: Progressing  Flowsheets (Taken 12/14/2023 0738)  Participates in plan/prevention/treatment measures: Increase activity/out of bed for meals  Goal: Prevent/manage excess moisture  Outcome: Progressing  Flowsheets (Taken 12/14/2023 0738)  Prevent/manage excess moisture: Monitor for/manage infection if present  Goal: Prevent/minimize sheer/friction injuries  Outcome: Progressing  Flowsheets (Taken 12/14/2023 0738)  Prevent/minimize sheer/friction injuries: Turn/reposition every 2 hours/use positioning/transfer devices  Goal: Promote/optimize nutrition  Outcome: Progressing  Flowsheets (Taken 12/14/2023 0738)  Promote/optimize nutrition: Monitor/record intake including meals  Goal: Promote skin healing  Outcome: Progressing  Flowsheets (Taken 12/14/2023 0738)  Promote skin healing: Assess skin/pad under line(s)/device(s)     Problem: Fall/Injury  Goal: Not fall by end of shift  Outcome: Progressing  Goal: Be free from injury by end of the shift  Outcome: Progressing  Goal: Verbalize understanding of personal risk factors for fall in the hospital  Outcome: Progressing  Goal: Verbalize understanding of risk factor reduction measures to prevent injury from fall in the home  Outcome: Progressing  Goal: Pace activities to prevent fatigue by end of the shift  Outcome: Progressing     Problem: Pain  Goal: Performs ADL's with improved pain control throughout shift  Outcome: Progressing     Problem: Pain  Goal: My pain/discomfort is manageable  Outcome: Progressing     Problem: Daily Care  Goal: Daily care needs are met  Outcome: Progressing     Problem: Psychosocial Needs  Goal: Demonstrates ability to cope with hospitalization/illness  Outcome: Progressing  Goal: Collaborate with me, my family, and caregiver to identify my specific goals  Outcome: Progressing     Problem: Discharge Barriers  Goal: My  discharge needs are met  Outcome: Progressing

## 2023-12-14 NOTE — NURSING NOTE
Volunteers coming to take pt out for discharge. Pt discharged accompanied by her brother. Meds were sent home yesterday with brother.

## 2023-12-14 NOTE — PROGRESS NOTES
[facility]     Urology Progress Note   PCP: Magdaleno Nielson DO    LOS: 0     Subjective: no acute events overnight  Denies fevers/chills/chest pain/shortness of air  Has not yet ambulated, using bedside commode to void  -flatus -BM  Taking regular diet  Pain controlled on PO meds     Past Medical History:   Diagnosis Date    Allergic contact dermatitis due to plants, except food 07/09/2021    Contact dermatitis due to poison ivy    Cervical disc disorder, unspecified, unspecified cervical region 08/05/2020    Cervical disc disease    Deficiency of other specified B group vitamins     Vitamin B 12 deficiency    Hypermobility syndrome 08/31/2020    Hypermobile joint syndrome of ankle    Hypothyroidism, unspecified 12/22/2020    Primary hypothyroidism    Localized edema 08/27/2020    Bilateral edema of lower extremity    Low back pain, unspecified 09/29/2020    Chronic midline low back pain, unspecified whether sciatica present    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    Chronic fatigue syndrome with fibromyalgia    Neuralgia and neuritis, unspecified 08/05/2020    Thoracic neuralgia    Obesity, unspecified 02/13/2020    Class 1 obesity without serious comorbidity with body mass index (BMI) of 32.0 to 32.9 in adult, unspecified obesity type    Other fecal abnormalities 11/15/2021    Positive colorectal cancer screening using Cologuard test    Other low back pain 07/05/2022    Intractable low back pain    Other shoulder lesions, right shoulder 02/13/2020    Rotator cuff tendonitis, right    Other symptoms and signs involving the musculoskeletal system 12/13/2021    Weakness of lower extremity    Other symptoms and signs involving the musculoskeletal system 10/27/2021    Weakness of left upper extremity    Other thyrotoxicosis without thyrotoxic crisis or storm 01/11/2021    Hyperthyroidism with Hashimoto disease    Pain in left elbow 09/16/2021    Elbow pain, left    Pain in left knee  12/13/2021    Acute pain of left knee    Pain in right finger(s) 01/03/2017    Thumb pain, right    Pain in right hip 01/03/2017    Pain of right hip joint    Pain in thoracic spine 08/05/2020    Chronic midline thoracic back pain    Pain in thoracic spine 08/05/2020    Chronic bilateral thoracic back pain    Personal history of diseases of the blood and blood-forming organs and certain disorders involving the immune mechanism 02/10/2022    History of anemia    Personal history of other diseases of the musculoskeletal system and connective tissue 03/08/2022    History of joint pain    Personal history of other diseases of the musculoskeletal system and connective tissue 07/25/2022    History of muscle spasm    Personal history of other diseases of the musculoskeletal system and connective tissue     History of back pain    Personal history of other diseases of the respiratory system     History of chronic obstructive lung disease    Personal history of other endocrine, nutritional and metabolic disease 09/03/2020    History of hyperthyroidism    Personal history of other endocrine, nutritional and metabolic disease 07/10/2021    History of hypothyroidism    Personal history of other endocrine, nutritional and metabolic disease 08/31/2020    History of vitamin B deficiency    Personal history of other endocrine, nutritional and metabolic disease     History of hypothyroidism    Personal history of other specified conditions 07/05/2022    History of balance disorder    Personal history of other specified conditions 06/24/2017    History of abdominal pain    Radiculopathy, cervical region 11/08/2021    Cervical neuritis    Repeated falls 07/05/2022    Multiple falls    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    Severe persistent asthma, uncomplicated 07/21/2021    Severe persistent asthmatic bronchitis without complication    Unspecified abdominal pain 11/15/2021    Intermittent  abdominal pain    Unspecified dislocation of left ulnohumeral joint, initial encounter 10/06/2021    Dislocation of left elbow, initial encounter    Venous insufficiency (chronic) (peripheral) 02/14/2022    Chronic venous insufficiency     Past Surgical History:   Procedure Laterality Date    ABDOMINAL SACROCOLPOPEXY N/A 12/13/2023    laparoscopic with lysis of adhesions    OTHER SURGICAL HISTORY  09/03/2020    Laparoscopic hysterectomy    OTHER SURGICAL HISTORY  09/03/2020    Lumpectomy    OTHER SURGICAL HISTORY  09/03/2020    Tonsillectomy    OTHER SURGICAL HISTORY  09/03/2020    Cystoscopy    OTHER SURGICAL HISTORY  09/03/2020    Corneal lasik    OTHER SURGICAL HISTORY  11/13/2019    left     Social History     Socioeconomic History    Marital status:      Spouse name: Not on file    Number of children: Not on file    Years of education: Not on file    Highest education level: Not on file   Occupational History    Not on file   Tobacco Use    Smoking status: Never    Smokeless tobacco: Never   Vaping Use    Vaping Use: Never used   Substance and Sexual Activity    Alcohol use: Not Currently    Drug use: Never    Sexual activity: Not Currently   Other Topics Concern    Not on file   Social History Narrative    Not on file     Social Determinants of Health     Financial Resource Strain: Low Risk  (12/13/2023)    Overall Financial Resource Strain (CARDIA)     Difficulty of Paying Living Expenses: Not hard at all   Food Insecurity: Not on file   Transportation Needs: No Transportation Needs (12/13/2023)    PRAPARE - Transportation     Lack of Transportation (Medical): No     Lack of Transportation (Non-Medical): No   Physical Activity: Insufficiently Active (12/13/2023)    Exercise Vital Sign     Days of Exercise per Week: 4 days     Minutes of Exercise per Session: 30 min   Stress: Not on file   Social Connections: Not on file   Intimate Partner Violence: Not on file   Housing Stability: Low Risk   (12/13/2023)    Housing Stability Vital Sign     Unable to Pay for Housing in the Last Year: No     Number of Places Lived in the Last Year: 1     Unstable Housing in the Last Year: No     Scheduled medications  acetaminophen, 975 mg, oral, q6h   Or  acetaminophen, 650 mg, rectal, q6h  cholecalciferol, 2,000 Units, oral, Daily  levothyroxine, 112 mcg, oral, Daily  polyethylene glycol, 17 g, oral, Daily  scopolamine, 1 patch, transdermal, Once  sennosides, 1 tablet, oral, Nightly  tamsulosin, 0.4 mg, oral, Daily      Continuous medications  dextrose 5 % and lactated Ringer's, 100 mL/hr, Last Rate: 100 mL/hr (12/13/23 2142)      PRN medications  PRN medications: albuterol, HYDROmorphone, ketorolac, magnesium hydroxide, metoclopramide **OR** metoclopramide, oxyCODONE  Allergies   Allergen Reactions    Mold Shortness of breath    Corticosteroids (Glucocorticoids) Other     Leg cramps    Darvocet A500 [Propoxyphene N-Acetaminophen] Blurred vision    Ketamine Hallucinations    Pollen Extracts Unknown    Propoxyphene-Acetaminophen Other    Adhesive Tape-Silicones Rash    Piroxicam Other     burning to eyes and swelling          Physical Exam:  General: Alert, cooperative, no acute distress  Eyes: Sclera clear  Cardiac: Extremities are warm and well perfused  Lungs: Breathing non-labored. Speaking in clear and complete sentences.  MSK: Ambulatory with steady gait   Abd: soft, nondistended, appropriately tender, lap incisions c/d/i  Neuro: Alert and oriented to person, place, and time  Psych: Normal mood and affect  Skin: No obvious lesions or rashes      Results from last 7 days   Lab Units 12/14/23  0323   SODIUM mmol/L 139   POTASSIUM mmol/L 3.6   CHLORIDE mmol/L 105   CO2 mmol/L 26   BUN mg/dL 12   CREATININE mg/dL 0.78   GLUCOSE mg/dL 112*   CALCIUM mg/dL 8.3*     Results from last 7 days   Lab Units 12/14/23  0323   WBC AUTO x10*3/uL 9.7   HEMOGLOBIN g/dL 12.3   HEMATOCRIT % 38.3   PLATELETS AUTO x10*3/uL 193          === 04/18/22 ===    US PELVIS    No results found for the last 90 days.      Assessment:  78 y.o. female  POD #1 s/p lap sacrocolpopexy by Dr. Maya, extensive NIKOLAY    Plan:  Reg diet  Ambulate/out of bed  Saline lock  PO pain control  Discharge planning

## 2023-12-14 NOTE — PROGRESS NOTES
12/14/23 0959   Discharge Planning   Living Arrangements Family members   Support Systems Family members   Assistance Needed none   Type of Residence Private residence   Number of Stairs to Enter Residence 7   Number of Stairs Within Residence 7   Home or Post Acute Services None   Patient expects to be discharged to: home   Does the patient need discharge transport arranged? No     Discharge planning assessment completed with patient. Patient is independent at home, and plans to return home when discharged. She confirmed her PCP is Dr Nielson. No home needs identified at this time. Patient is aware Care Transitions team is available should discharge needs arise.

## 2023-12-14 NOTE — NURSING NOTE
Pt being discharged. Discharge instructions reviewed with the patient reguarding new/continued medications and follow up appointments. Medications at bedside via Med to Beds. All questions answered at this time. All belongings to go with the patient. IV removed.

## 2023-12-15 ENCOUNTER — TELEPHONE (OUTPATIENT)
Dept: UROLOGY | Facility: CLINIC | Age: 78
End: 2023-12-15
Payer: MEDICARE

## 2023-12-15 NOTE — DISCHARGE SUMMARY
Discharge Diagnosis  S/P sacrocolpopexy    Issues Requiring Follow-Up  Postop     Test Results Pending At Discharge  Pending Labs       No current pending labs.            Hospital Course  Patient underwent lap scp and melissa. Postop course experienced Postperative nausea/vomiting. Admitted overnight. Uneventful course. Dc'd without joy.   Pertinent Physical Exam At Time of Discharge  Physical Exam  Negative exam     Home Medications     Medication List      START taking these medications     acetaminophen 500 mg tablet; Commonly known as: Tylenol; Take 2 tablets   (1,000 mg) by mouth every 6 hours if needed for mild pain (1 - 3) for up   to 7 days.   ketorolac 10 mg tablet; Commonly known as: Toradol; Take 1 tablet (10   mg) by mouth every 6 hours if needed for moderate pain (4 - 6) for up to 5   days.   polyethylene glycol 17 gram/dose powder; Commonly known as: Glycolax,   Miralax; Take 17 g by mouth once daily.   senna 8.6 mg tablet; Generic drug: sennosides; Take 1 tablet (8.6 mg) by   mouth once daily.   traMADol 50 mg tablet; Commonly known as: Ultram; Take 1 tablet (50 mg)   by mouth every 8 hours if needed for severe pain (7 - 10) for up to 3   days.     CONTINUE taking these medications     Acidophilus capsule; Generic drug: lactobacillus acidophilus   albuterol 90 mcg/actuation inhaler   B-12 Plus 5,000-100 mcg tablet, sublingual; Generic drug:   cyanocobalamin-cobamamide   cholecalciferol 50 MCG (2000 UT) tablet; Commonly known as: Vitamin D-3   cyanocobalamin 1,000 mcg/mL injection; Commonly known as: Vitamin B-12   estradiol 0.01 % (0.1 mg/gram) vaginal cream; Commonly known as: Estrace   levothyroxine 112 mcg tablet; Commonly known as: Synthroid, Levoxyl;   Take 1 tablet (112 mcg) by mouth once daily.   magnesium chloride 71.5 mg tablet,delayed release (DR/EC)   * tamsulosin 0.4 mg 24 hr capsule; Commonly known as: Flomax; Take 3   days before surgery and 7 days after   * tamsulosin 0.4 mg 24 hr  capsule; Commonly known as: Flomax; Take 3   days before surgery and 7 days after  * This list has 2 medication(s) that are the same as other medications   prescribed for you. Read the directions carefully, and ask your doctor or   other care provider to review them with you.     ASK your doctor about these medications     budesonide-formoteroL 160-4.5 mcg/actuation inhaler; Commonly known as:   Symbicort; Inhale 2 puffs 2 times a day.       Outpatient Follow-Up  Future Appointments   Date Time Provider Department Center   4/30/2024  9:40 AM Magdaleno Nielson DO [id number]       Bernardino Maya MD

## 2023-12-18 DIAGNOSIS — N81.9 FEMALE GENITAL PROLAPSE, UNSPECIFIED TYPE: ICD-10-CM

## 2023-12-18 RX ORDER — TAMSULOSIN HYDROCHLORIDE 0.4 MG/1
CAPSULE ORAL
Qty: 10 CAPSULE | Refills: 0 | OUTPATIENT
Start: 2023-12-18

## 2024-01-04 ENCOUNTER — TELEPHONE (OUTPATIENT)
Dept: UROLOGY | Facility: CLINIC | Age: 79
End: 2024-01-04
Payer: MEDICARE

## 2024-01-04 NOTE — TELEPHONE ENCOUNTER
Pt calling in wanting to know if it is okay for her to see her chiropractor due to her having a surgery with you back in December. She needs a clearance before they will twist her or do anything with her lower back and hip.

## 2024-01-08 ENCOUNTER — TELEPHONE (OUTPATIENT)
Dept: PRIMARY CARE | Facility: CLINIC | Age: 79
End: 2024-01-08
Payer: MEDICARE

## 2024-01-08 DIAGNOSIS — M53.9 MULTILEVEL DEGENERATIVE DISC DISEASE: ICD-10-CM

## 2024-01-08 DIAGNOSIS — M54.6 DORSALGIA OF THORACIC REGION: ICD-10-CM

## 2024-01-08 DIAGNOSIS — M70.61 GREATER TROCHANTERIC BURSITIS OF RIGHT HIP: ICD-10-CM

## 2024-01-08 NOTE — TELEPHONE ENCOUNTER
Patient requesting a referral for Physical therapy in [place] for back and hip.  She had a referral before but needs a new script.  Please advise.

## 2024-01-10 ENCOUNTER — APPOINTMENT (OUTPATIENT)
Dept: PHYSICAL THERAPY | Facility: CLINIC | Age: 79
End: 2024-01-10
Payer: MEDICARE

## 2024-01-19 ENCOUNTER — OFFICE VISIT (OUTPATIENT)
Dept: UROLOGY | Facility: CLINIC | Age: 79
End: 2024-01-19
Payer: MEDICARE

## 2024-01-19 DIAGNOSIS — N39.0 ACUTE UTI: ICD-10-CM

## 2024-01-19 DIAGNOSIS — N81.9 FEMALE GENITAL PROLAPSE, UNSPECIFIED TYPE: ICD-10-CM

## 2024-01-19 PROCEDURE — 1160F RVW MEDS BY RX/DR IN RCRD: CPT | Performed by: STUDENT IN AN ORGANIZED HEALTH CARE EDUCATION/TRAINING PROGRAM

## 2024-01-19 PROCEDURE — 87086 URINE CULTURE/COLONY COUNT: CPT

## 2024-01-19 PROCEDURE — 99024 POSTOP FOLLOW-UP VISIT: CPT | Performed by: STUDENT IN AN ORGANIZED HEALTH CARE EDUCATION/TRAINING PROGRAM

## 2024-01-19 PROCEDURE — 1036F TOBACCO NON-USER: CPT | Performed by: STUDENT IN AN ORGANIZED HEALTH CARE EDUCATION/TRAINING PROGRAM

## 2024-01-19 PROCEDURE — 1125F AMNT PAIN NOTED PAIN PRSNT: CPT | Performed by: STUDENT IN AN ORGANIZED HEALTH CARE EDUCATION/TRAINING PROGRAM

## 2024-01-19 NOTE — PROGRESS NOTES
HISTORY OF PRESENT ILLNESS:  Jie is a 79 y/o female who returns today for a post operative visit. She is s/p laparoscopic sacrocolpopexy on 12/13/23. She reports today that she is well. She is having some itching near her incisions. She also is experiencing back pain but has been doing physical therapy and seeing a chiropractor.          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), Neuralgia and neuritis, unspecified (08/05/2020), Obesity, unspecified (02/13/2020), Other fecal abnormalities (11/15/2021), Other low back pain (07/05/2022), Other shoulder lesions, right shoulder (02/13/2020), Other symptoms and signs involving the musculoskeletal system (12/13/2021), Other symptoms and signs involving the musculoskeletal system (10/27/2021), Other thyrotoxicosis without thyrotoxic crisis or storm (01/11/2021), Pain in left elbow (09/16/2021), Pain in left knee (12/13/2021), Pain in right finger(s) (01/03/2017), Pain in right hip (01/03/2017), Pain in thoracic spine (08/05/2020), Pain in thoracic spine (08/05/2020), Personal history of diseases of the blood and blood-forming organs and certain disorders involving the immune mechanism (02/10/2022), Personal history of other diseases of the musculoskeletal system and connective tissue (03/08/2022), Personal history of other diseases of the musculoskeletal system and connective tissue (07/25/2022), Personal history of other diseases of the musculoskeletal system and connective tissue, Personal history of other diseases of the respiratory system,  Personal history of other endocrine, nutritional and metabolic disease (09/03/2020), Personal history of other endocrine, nutritional and metabolic disease (07/10/2021), Personal history of other endocrine, nutritional and metabolic disease (08/31/2020), Personal history of other endocrine, nutritional and metabolic disease, Personal history of other specified conditions (07/05/2022), Personal history of other specified conditions (06/24/2017), Radiculopathy, cervical region (11/08/2021), Repeated falls (07/05/2022), 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    Surgical History  She has a past surgical history that includes Other surgical history (09/03/2020); Other surgical history (09/03/2020); Other surgical history (09/03/2020); Other surgical history (09/03/2020); Other surgical history (09/03/2020); Other surgical history (11/13/2019); and Abdominal sacrocolpopexy (N/A, 12/13/2023).     Social History  She reports that she has never smoked. She has never used smokeless tobacco. She reports that she does not currently use alcohol. She reports that she does not use drugs.    Family History  Family History   Problem Relation Name Age of Onset    Heart attack Mother          age 52    Heart attack Other      Dementia Other          Allergies  Mold, Corticosteroids (glucocorticoids), Darvocet a500 [propoxyphene n-acetaminophen], Ketamine, Pollen extracts, Propoxyphene-acetaminophen, Adhesive tape-silicones, and Piroxicam      A comprehensive 10+ review of systems was negative except for: see hpi                          PHYSICAL EXAMINATION:  BP Readings from Last 3 Encounters:   12/14/23 112/70   12/05/23 120/74   12/05/23 (!) 106/0      Wt Readings from Last 3 Encounters:  "  12/13/23 89.4 kg (197 lb 1.5 oz)   12/05/23 89.4 kg (197 lb)   11/15/23 90 kg (198 lb 6.4 oz)      BMI: Estimated body mass index is 29.97 kg/m² as calculated from the following:    Height as of 12/13/23: 1.727 m (5' 7.99\").    Weight as of 12/13/23: 89.4 kg (197 lb 1.5 oz).  BSA: Estimated body surface area is 2.07 meters squared as calculated from the following:    Height as of 12/13/23: 1.727 m (5' 7.99\").    Weight as of 12/13/23: 89.4 kg (197 lb 1.5 oz).  HEENT: Normocephalic, atraumatic, PER EOMI, nonicteric, trachea normal, thyroid normal, oropharynx normal.  CARDIAC: regular rate & rhythm, S1 & S2 normal.  No heaves, thrills, gallops or murmurs.  LUNGS: Clear to auscultation, no spinal or CV tenderness.  EXTREMITIES: No evidence of cyanosis, clubbing or edema.         Stage 0 POP       Assessment:  78 year old patient referred from urge incontinence and stage II vaginal prolapse     POP:  Status post laparoscopic sacrocolpopexy 12/13/2023 doing well        MEGAN:  UDS negative, no sling indicated    symptoms are resolved     OAB:  Symptoms have resolved follow-up in 3 months       Bernardino Maya MD     Scribe Attestation  By signing my name below, IKika Scribe   attest that this documentation has been prepared under the direction and in the presence of Bernardino Maya MD.      "

## 2024-01-21 LAB — BACTERIA UR CULT: NORMAL

## 2024-01-22 ENCOUNTER — EVALUATION (OUTPATIENT)
Dept: PHYSICAL THERAPY | Facility: CLINIC | Age: 79
End: 2024-01-22
Payer: MEDICARE

## 2024-01-22 DIAGNOSIS — M54.6 DORSALGIA OF THORACIC REGION: ICD-10-CM

## 2024-01-22 DIAGNOSIS — R53.1 GENERAL WEAKNESS: Primary | ICD-10-CM

## 2024-01-22 DIAGNOSIS — M70.61 GREATER TROCHANTERIC BURSITIS OF RIGHT HIP: ICD-10-CM

## 2024-01-22 PROCEDURE — 97162 PT EVAL MOD COMPLEX 30 MIN: CPT | Mod: GP

## 2024-01-22 ASSESSMENT — PAIN SCALES - GENERAL
PAINLEVEL_OUTOF10: 0 - NO PAIN

## 2024-01-22 ASSESSMENT — ENCOUNTER SYMPTOMS
OCCASIONAL FEELINGS OF UNSTEADINESS: 1
LOSS OF SENSATION IN FEET: 0

## 2024-01-22 ASSESSMENT — PAIN - FUNCTIONAL ASSESSMENT: PAIN_FUNCTIONAL_ASSESSMENT: 0-10

## 2024-01-22 NOTE — PROGRESS NOTES
Physical Therapy    Physical Therapy Evaluation    Patient Name: Jie Crocker  MRN: [id number]  Name and date of birth 1945 were confirmed at the start of today's session.  Today's Date: 1/22/24  Time Calculation  Start Time: 1445  Stop Time: 1530  Time Calculation (min): 45 min    Assessment  PT Assessment Results: Decreased strength, Pain, Decreased mobility, Decreased endurance  Rehab Prognosis: Good  Assessment Comment: Jie presents with weakness of trunk and lower extremity mm ; especially signficiant right glut max and glut med weakness; and weakness of postural mm; which are likely contributing to her antalgic gait; poor standing tolerance and poor tolerance for loaded activities in sitting and standing (i.e. cooking; sewing; gardening). She will benefit from a course of skilled PT to work to address her deficits to improve her symptom management/ pain control and tolerance for ADLs, IADLs, recreational pursuits.    Plan  PT Plan: Skilled PT  PT Frequency: 2 times per week  Duration: 8 weeks (pending progress)  Onset Date: 01/01/23 (chronic)  Certification Period Start Date: 01/22/24  Certification Period End Date: 04/21/24  Number of Treatments Authorized: Medical Necessity  Rehab Potential: Good  Plan of Care Agreement: Patient    Current Problem  1. General weakness  Referral to Physical Therapy    Follow Up In Physical Therapy      2. Greater trochanteric bursitis of right hip  Referral to Physical Therapy    Follow Up In Physical Therapy      3. Dorsalgia of thoracic region  Referral to Physical Therapy    Follow Up In Physical Therapy          Subjective   General:  General  Reason for Referral: Right hip greater trochanteric bursitis; thoracic back pain, low back pain.  Referred By: Dr Magdaleno Nielson  Past Medical History Relevant to Rehab: PMH: includes 12/13/23 Bladder lift- no longer has post op precuations per pt; Left TKA 2011. Fibromyalgia.  General Comment: Jie comes to therapy with  "pain in her mid back since 2008,but she is feeling greater increases in her low back and right hip pain over the past month especially.  Her mid back is severe with prolonged sitting, standing to cook or do dishes- and she must lean over the sink to finish. 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 Her low back and right hip are aggravated by walking and turning over in bed.  She has been to many Dr[facility]reya[facility] and to a chiropractor, PT last fall. She does still see a chiropractor for adjustments, which helps at least temporarily.  Precautions:  Precautions  STEADI Fall Risk Score (The score of 4 or more indicates an increased risk of falling): 3  Precautions Comment: Fibromyalgia  Pain:  Pain Assessment: 0-10  Pain Score: 0 - No pain (0/10 at rest;  9-10/10 at worst with prolonged standing)  Pain Location: Back  Pain Orientation: Mid (thoracic) (low and right hip as well)  States that at rest she does not have pain; but with standing; walking; gardening; quilting; food prep she finds her pain elevates and she has to lean over in order to continue these tasks. Her low back is much more aggravated by turning in bed or when first getting up in the morning.     Home Living:  Home Living Comment[place] 7 steps x 2 with 1 rail.  Lives with brother.  Prior Function Per Pt/Caregiver Report:  Level of [place]:  (Independent self care and some ADLs/ IADLs; drives. Brother assists with cooking; housekeeping as she is limited by pain.)  Leisure: Gardening; quilting. Garden clubs.    Objective   Posture:    Forward head , rounded shoulders   Describes pain in thoracic region as in lower right (T10-11 area)  Reports sitting in recliner is comfortable; sitting in firm kitchen/ dining chair is not comfortable.   Reports \"hip pain\" at buttock; to outer hip         Spine and  HIP     Observation  Observation Comment: Mild thoracic kyphosis in standing; iliac crest heights are level ; flattened " "lumbar lordosis.  Spine/ Hip Palpation/Joint Mobility   No focal tenderness noted along thoracic paraspinals/ spinouse processes or in lumbar region in standing today.   Palpation / Joint Mobility Comment: Mild tenderness over and just posterior to right greater trochanter.  Lumbar AROM  Lumbar flexion: (60°): WFL- increased low back pain upon return especially  Lumbar extension (25°): min dec - pain across low back  Lumbar sidebend right (25°): WFL- pulls in low back  Lumbar sidebend left (25°): WFL- pulls on right side  Hip AROM   Grossly WFL  Hip PROM  Hip PROM WFL:  (WFL except for IR which is mod limited bilaterally)  \"Clunking\" of right hip/ at greater trochanter palpated with hip IR/ER.     Specific Lower Extremity MMT  R Iliopsoas: (5/5): 4/5  L Iliopsoas: (5/5): 4/5  R Gluteals (prone): (5/5): 4-/5  L Gluteals (prone): (5/5): 4/5  R Gluteals (sidelying): (5/5): 2+/5  L Gluteals (sidelying): (5/5): 4/5  R Hip External Rotation: (5/5): 4/5  L Hip External Rotation: (5/5): 4+/5    R Quads:  4+/5; poor eccentric control  L Quads: 4+/5  R Hamstrings: 4+/5  L Hamstrings: 4+/5    Abdominals: 3+/5  Trunk ext:  4/5  Scapular/ mid back mm: grossly 4/5    Special Tests  Supine SLR: (Negative): Negative bilaterally  Charley’s Test: (Negative): Positive R/L  Other: Slump negative bilaterally; Hip scour neg bilat; neg FRAN and FADIR bilat.    Gait  Gait Comment: Ambulates with limp on right side; flexed at trunk; and trendelenburg pattern on right during stance.  Reports step to gait pattern on stairs and definite use of rail.  Flexibility  R hamstrings: wfl  L hamstrings: wfl    Outcome Measures:  Other Measures  Lower Extremity Funtional Score (LEFS): 32/80     OP EDUCATION:  Outpatient Education  Individual(s) Educated: Patient  Education Provided: Body Mechanics, POC, Posture  Risk and Benefits Discussed with Patient/Caregiver/Other: yes  Patient/Caregiver Demonstrated Understanding: yes  Plan of Care Discussed and " Agreed Upon: yes  Patient Response to Education: Patient/Caregiver Verbalized Understanding of Information    Goals:  Patient Goal: Wants to have less pain and be able to get down on knees to garden.     PT Goals:   Will be independent with symptom management strategies and report mid back pain no worse than 4/10 with standing/ sitting up to 20 min at a time for ADLs, IADLs, quilting. Low back and right hip pain no worse than 4/10 with turning over in bed; when first waking; and with walking up to 15 min.  Right glut med/ max strength improved to at least 4/5; core/ postural mm at least 4+/5 for improved standing tolerance for cooking/food prep; improved painfree rolling in bed; and improved ability to kneel/ partial squat for gardening; household IADLs.   Will improve outcome measure score on LEFS to at least 45/80 for improved function with selfcare, ADL's, IADLs  Will demonstrate and consistently use proper posture and body mechanics for symptom management with all daily activities  Will be independent and compliant with appropriate HEP for carryover of PT to meet all goals.

## 2024-01-29 ENCOUNTER — TREATMENT (OUTPATIENT)
Dept: PHYSICAL THERAPY | Facility: CLINIC | Age: 79
End: 2024-01-29
Payer: MEDICARE

## 2024-01-29 DIAGNOSIS — M70.61 GREATER TROCHANTERIC BURSITIS OF RIGHT HIP: ICD-10-CM

## 2024-01-29 DIAGNOSIS — M54.6 DORSALGIA OF THORACIC REGION: ICD-10-CM

## 2024-01-29 DIAGNOSIS — R53.1 GENERAL WEAKNESS: ICD-10-CM

## 2024-01-29 PROCEDURE — 97110 THERAPEUTIC EXERCISES: CPT | Mod: GP

## 2024-01-29 ASSESSMENT — PAIN SCALES - GENERAL
PAINLEVEL_OUTOF10: 0 - NO PAIN

## 2024-01-29 ASSESSMENT — PAIN - FUNCTIONAL ASSESSMENT: PAIN_FUNCTIONAL_ASSESSMENT: 0-10

## 2024-01-29 NOTE — PROGRESS NOTES
Physical Therapy    Physical Therapy Treatment    Patient Name: Jie Crocker  MRN: [id number]  Today's Date: 1/29/2024  Time Calculation  Start Time: 1215  Stop Time: 1300  Time Calculation (min): 45 min      Assessment:   We reviewed her current HEP given to her at her last PT. We discussed holding off on it for now; and we will modify it and integrate additional ex as indicated over the next few visits.   Tolerated all ex today; but required min cueing for correct technique and not to rush exercises. Mild medial knee discomfort noted on NuStep but it decreased with decreased resistance and seat adjustment. Focused on hip/ core strengthening and mid back strengthening.     Plan:   Assess response to today's exercises and issue modified HEP next session.     Current Problem  1. Greater trochanteric bursitis of right hip  Follow Up In Physical Therapy      2. Dorsalgia of thoracic region  Follow Up In Physical Therapy      3. General weakness  Follow Up In Physical Therapy          General     General  General Comment: Reports having severe right medial knee pain all day yesterday; had to limit her activity. It is less sore today. She is not having mid back pain currently; but it is worse with standing; sitting to quilt. Reports feeling the need to stretch through her back ; describes some stretches she is doing at home (holding on to bed; rocking to stretch downward; and brings both knees to chest)..    Subjective    Precautions  Precautions  STEADI Fall Risk Score (The score of 4 or more indicates an increased risk of falling): 3  Precautions Comment: Fibromyalgia    Pain  Pain Assessment  Pain Assessment: 0-10  Pain Score: 0 - No pain  Pain Location: Back  Pain Orientation: Mid  Pain 2  Pain Score 2: 0 - No pain  Pain Location 2: Back  Pain Orientation 2: Lower  Pain 3  Pain Score 3: 0 - No pain  Pain Location 3: Hip  Pain Orientation 3: Right    Objective     Treatments:  EXERCISES Date 1/29/24 Date Date Date     "Visit 2 Visit Visit Visit    Nustep   L3 x 7 min             Single knee to chest stretch  2 x 20 sec      Seated hamstring stretch              Hooklying gluteal sets  10 x 5 sec      Bridging  15 x 5 sec             Hooklying isometric hip abd (with belt)  10 x 5 sec             Seated repeated trunk ext   10 reps              Tband mid rows  Orange x 15      Tband bilateral pulldowns  [place] x 15             \"No monies\"              HEP         Goals:  Patient Goal: Wants to have less pain and be able to get down on knees to garden.     PT Goals:   Will be independent with symptom management strategies and report mid back pain no worse than 4/10 with standing/ sitting up to 20 min at a time for ADLs, IADLs, quilting. Low back and right hip pain no worse than 4/10 with turning over in bed; when first waking; and with walking up to 15 min.  Right glut med/ max strength improved to at least 4/5; core/ postural mm at least 4+/5 for improved standing tolerance for cooking/food prep; improved painfree rolling in bed; and improved ability to kneel/ partial squat for gardening; household IADLs.   Will improve outcome measure score on LEFS to at least 45/80 for improved function with selfcare, ADL's, IADLs  Will demonstrate and consistently use proper posture and body mechanics for symptom management with all daily activities  Will be independent and compliant with appropriate HEP for carryover of PT to meet all goals.      "

## 2024-01-31 ENCOUNTER — TREATMENT (OUTPATIENT)
Dept: PHYSICAL THERAPY | Facility: CLINIC | Age: 79
End: 2024-01-31
Payer: MEDICARE

## 2024-01-31 DIAGNOSIS — R53.1 GENERAL WEAKNESS: ICD-10-CM

## 2024-01-31 DIAGNOSIS — M70.61 GREATER TROCHANTERIC BURSITIS OF RIGHT HIP: ICD-10-CM

## 2024-01-31 DIAGNOSIS — M54.6 DORSALGIA OF THORACIC REGION: ICD-10-CM

## 2024-01-31 PROCEDURE — 97110 THERAPEUTIC EXERCISES: CPT | Mod: GP

## 2024-01-31 ASSESSMENT — PAIN SCALES - GENERAL
PAINLEVEL_OUTOF10: 0 - NO PAIN
PAINLEVEL_OUTOF10: 0 - NO PAIN

## 2024-01-31 NOTE — PROGRESS NOTES
Physical Therapy    Physical Therapy Treatment    Patient Name: Jie Crocekr  MRN: [id number]  Today's Date: 1/31/2024  Time Calculation  Start Time: 1015  Stop Time: 1100  Time Calculation (min): 45 min      Assessment:   Tolerated all ex without increased pain. Updated HEP today to focus on postural mm; isometric gluteal and hip strengthening.     Plan:   Progress postural mm strengthening; core and hip strengthening at subsymptom levels.     Current Problem  1. Greater trochanteric bursitis of right hip  Follow Up In Physical Therapy      2. Dorsalgia of thoracic region  Follow Up In Physical Therapy      3. General weakness  Follow Up In Physical Therapy          General     General  General Comment: HIp and knee were hurting a lot more as soon as she got up today. Took 2 advil and is feeling a little better. Her back is not hurting. HIp does not hurt when sitting . The knee to chest stretches are helping; is no longer having sharp pains. Will see Dr Serrato next week for her knee and hip.    Subjective    Precautions  Precautions  STEADI Fall Risk Score (The score of 4 or more indicates an increased risk of falling): 3  Precautions Comment: Fibromyalgia  Vital Signs     Pain  Pain 2  Pain Score 2: 0 - No pain  Pain Location 2: Back  Pain Orientation 2: Lower  Pain 3  Pain Score 3: 0 - No pain (10/10 first thing this am; knee is 4/10)  Pain Location 3: Hip  Pain Orientation 3: Right    Objective       Treatments:  EXERCISES Date 1/29/24 Date 1/31/24 Date Date    Visit 2 Visit 3 Visit Visit    Nustep   L3 x 7 min  L3 x 15 min            Single knee to chest stretch  2 x 20 sec  2 x 20 sec     Seated hamstring stretch              Hooklying gluteal sets  10 x 5 sec   10 x 5 sec     Bridging  15 x 5 sec  15 x 5 sec            Hooklying isometric hip abd (with belt)  10 x 5 sec   10 x 5 sec            Seated repeated trunk ext   10 reps              Tband mid rows  Orange x 15 Blue x20     Tband bilateral pulldowns   "Orange x 15 Blue x 20            \"No monies\"   Orange  2 x 10            HEP  updated     HEP/ PT Education:   Access Code: [email or web address]  URL: [email or web address]  Date: 01/31/2024  Prepared by: Kaitlynn Brewer    Exercises  - Hooklying Gluteal Sets  - 1 x daily - 4 x weekly - 10 reps - 5 sec hold  - Hooklying Isometric Hip Abduction with Belt  - 1 x daily - 4 x weekly - 10 reps - 5 sec hold  - Supine Bridge with Gluteal Set and Spinal Articulation  - 1 x daily - 4 x weekly - 15 reps - 5 sec hold  - Standing Shoulder External Rotation with Resistance  - 1 x daily - 4 x weekly - 2 sets - 10 reps  - Standing Shoulder Row with Anchored Resistance  - 1 x daily - 4 x weekly - 2 sets - 10 reps  - Shoulder extension with resistance - Neutral  - 1 x daily - 4 x weekly - 2 sets - 10 reps    Goals:  Patient Goal: Wants to have less pain and be able to get down on knees to garden.     PT Goals:   Will be independent with symptom management strategies and report mid back pain no worse than 4/10 with standing/ sitting up to 20 min at a time for ADLs, IADLs, quilting. Low back and right hip pain no worse than 4/10 with turning over in bed; when first waking; and with walking up to 15 min.  Right glut med/ max strength improved to at least 4/5; core/ postural mm at least 4+/5 for improved standing tolerance for cooking/food prep; improved painfree rolling in bed; and improved ability to kneel/ partial squat for gardening; household IADLs.   Will improve outcome measure score on LEFS to at least 45/80 for improved function with selfcare, ADL's, IADLs  Will demonstrate and consistently use proper posture and body mechanics for symptom management with all daily activities  Will be independent and compliant with appropriate HEP for carryover of PT to meet all goals.        "

## 2024-02-06 ENCOUNTER — HOSPITAL ENCOUNTER (OUTPATIENT)
Dept: RADIOLOGY | Facility: CLINIC | Age: 79
Discharge: HOME | End: 2024-02-06
Payer: MEDICARE

## 2024-02-06 ENCOUNTER — OFFICE VISIT (OUTPATIENT)
Dept: ORTHOPEDIC SURGERY | Facility: CLINIC | Age: 79
End: 2024-02-06
Payer: MEDICARE

## 2024-02-06 ENCOUNTER — TREATMENT (OUTPATIENT)
Dept: PHYSICAL THERAPY | Facility: CLINIC | Age: 79
End: 2024-02-06
Payer: MEDICARE

## 2024-02-06 VITALS — HEIGHT: 68 IN | BODY MASS INDEX: 29.1 KG/M2 | WEIGHT: 192 LBS

## 2024-02-06 DIAGNOSIS — M25.561 PAIN IN BOTH KNEES, UNSPECIFIED CHRONICITY: ICD-10-CM

## 2024-02-06 DIAGNOSIS — M79.671 FOOT PAIN, BILATERAL: ICD-10-CM

## 2024-02-06 DIAGNOSIS — M70.61 GREATER TROCHANTERIC BURSITIS OF RIGHT HIP: ICD-10-CM

## 2024-02-06 DIAGNOSIS — M70.61 GREATER TROCHANTERIC BURSITIS OF RIGHT HIP: Primary | ICD-10-CM

## 2024-02-06 DIAGNOSIS — M25.562 PAIN IN BOTH KNEES, UNSPECIFIED CHRONICITY: ICD-10-CM

## 2024-02-06 DIAGNOSIS — M54.6 DORSALGIA OF THORACIC REGION: ICD-10-CM

## 2024-02-06 DIAGNOSIS — M79.672 FOOT PAIN, BILATERAL: ICD-10-CM

## 2024-02-06 DIAGNOSIS — R53.1 GENERAL WEAKNESS: ICD-10-CM

## 2024-02-06 PROCEDURE — 73564 X-RAY EXAM KNEE 4 OR MORE: CPT | Mod: BILATERAL PROCEDURE | Performed by: RADIOLOGY

## 2024-02-06 PROCEDURE — 73502 X-RAY EXAM HIP UNI 2-3 VIEWS: CPT | Mod: RT

## 2024-02-06 PROCEDURE — 1159F MED LIST DOCD IN RCRD: CPT | Performed by: ORTHOPAEDIC SURGERY

## 2024-02-06 PROCEDURE — 97110 THERAPEUTIC EXERCISES: CPT | Mod: GP,CQ

## 2024-02-06 PROCEDURE — 99214 OFFICE O/P EST MOD 30 MIN: CPT | Performed by: ORTHOPAEDIC SURGERY

## 2024-02-06 PROCEDURE — 1125F AMNT PAIN NOTED PAIN PRSNT: CPT | Performed by: ORTHOPAEDIC SURGERY

## 2024-02-06 PROCEDURE — 20610 DRAIN/INJ JOINT/BURSA W/O US: CPT | Performed by: ORTHOPAEDIC SURGERY

## 2024-02-06 PROCEDURE — 1036F TOBACCO NON-USER: CPT | Performed by: ORTHOPAEDIC SURGERY

## 2024-02-06 PROCEDURE — 73502 X-RAY EXAM HIP UNI 2-3 VIEWS: CPT | Mod: RIGHT SIDE | Performed by: RADIOLOGY

## 2024-02-06 PROCEDURE — 73564 X-RAY EXAM KNEE 4 OR MORE: CPT | Mod: 50

## 2024-02-06 RX ORDER — TRIAMCINOLONE ACETONIDE 40 MG/ML
80 INJECTION, SUSPENSION INTRA-ARTICULAR; INTRAMUSCULAR
Status: COMPLETED | OUTPATIENT
Start: 2024-02-06 | End: 2024-02-06

## 2024-02-06 RX ADMIN — TRIAMCINOLONE ACETONIDE 80 MG: 40 INJECTION, SUSPENSION INTRA-ARTICULAR; INTRAMUSCULAR at 13:09

## 2024-02-06 ASSESSMENT — PAIN - FUNCTIONAL ASSESSMENT: PAIN_FUNCTIONAL_ASSESSMENT: 0-10

## 2024-02-06 ASSESSMENT — PAIN DESCRIPTION - DESCRIPTORS: DESCRIPTORS: SHARP;STABBING

## 2024-02-06 ASSESSMENT — PAIN SCALES - GENERAL: PAINLEVEL_OUTOF10: 7

## 2024-02-06 NOTE — PROGRESS NOTES
"Physical Therapy    Physical Therapy Treatment    Patient Name: Jie Crocker  MRN: [id number]  Today's Date: 2/6/2024  Time Calculation  Start Time: 1215  Stop Time: 1300  Time Calculation (min): 45 min      Assessment:   Pt. declined doing T-band exercises today. She felt that these exercises may have caused right shoulder pain after her last PT session.   Pt. completed her exercise program without increased right hip/knee pain.    Plan:   Progress postural mm strengthening; core and hip strengthening at subsymptom levels.     Current Problem  1. Greater trochanteric bursitis of right hip  Follow Up In Physical Therapy      2. Dorsalgia of thoracic region  Follow Up In Physical Therapy      3. General weakness  Follow Up In Physical Therapy          General    Subjective    Pt. had an appointment with Dr. Serrato earlier today. He gave her a cortisone injection in her right hip . She may be getting a cortisone injection in her right knee next week.    Precautions   Precautions  STEADI Fall Risk Score (The score of 4 or more indicates an increased risk of falling): 3  Precautions Comment: Fibromyalgia     Pain   0-10    7-8/10  pain   Right hip and knee   Sharp, stabbing    Objective   Added hamstring stretch   Gait - antalgic limp    Treatments:  EXERCISES Date 1/29/24 Date 1/31/24 Date 2/6/24 Date    Visit 2 Visit 3 Visit Visit    Nustep   L3 x 7 min  L3 x 15 min L3 12 min           Single knee to chest stretch  2 x 20 sec  2 x 20 sec 2x20 ea    Seated hamstring stretch              Hooklying gluteal sets  10 x 5 sec   10 x 5 sec 10x5 sec    Bridging  15 x 5 sec  15 x 5 sec 15x5 sec           Hooklying isometric hip abd (with belt)  10 x 5 sec   10 x 5 sec 15x 5 sec           Seated repeated trunk ext   10 reps   15x           Tband mid rows  Orange x 15 Blue x20 ----    Tband bilateral pulldowns  Orange x 15 Blue x 20 ----           \"No monies\"   Orange  2 x 10 ----           HEP  updated     HEP/ PT Education: "   Access Code: [email or web address]  URL: [email or web address]  Date: 01/31/2024  Prepared by: Kaitlynn Brewer    Exercises  - Hooklying Gluteal Sets  - 1 x daily - 4 x weekly - 10 reps - 5 sec hold  - Hooklying Isometric Hip Abduction with Belt  - 1 x daily - 4 x weekly - 10 reps - 5 sec hold  - Supine Bridge with Gluteal Set and Spinal Articulation  - 1 x daily - 4 x weekly - 15 reps - 5 sec hold  - Standing Shoulder External Rotation with Resistance  - 1 x daily - 4 x weekly - 2 sets - 10 reps  - Standing Shoulder Row with Anchored Resistance  - 1 x daily - 4 x weekly - 2 sets - 10 reps  - Shoulder extension with resistance - Neutral  - 1 x daily - 4 x weekly - 2 sets - 10 reps    Goals:  Patient Goal: Wants to have less pain and be able to get down on knees to garden.     PT Goals:   Will be independent with symptom management strategies and report mid back pain no worse than 4/10 with standing/ sitting up to 20 min at a time for ADLs, IADLs, quilting. Low back and right hip pain no worse than 4/10 with turning over in bed; when first waking; and with walking up to 15 min.  Right glut med/ max strength improved to at least 4/5; core/ postural mm at least 4+/5 for improved standing tolerance for cooking/food prep; improved painfree rolling in bed; and improved ability to kneel/ partial squat for gardening; household IADLs.   Will improve outcome measure score on LEFS to at least 45/80 for improved function with selfcare, ADL's, IADLs  Will demonstrate and consistently use proper posture and body mechanics for symptom management with all daily activities  Will be independent and compliant with appropriate HEP for carryover of PT to meet all goals.

## 2024-02-06 NOTE — PROGRESS NOTES
PRIMARY CARE PHYSICIAN: Magdaleno Nielson DO  REFERRING PROVIDER: No referring provider defined for this encounter.     CONSULT ORTHOPAEDIC: Hip Evaluation        ASSESSMENT & PLAN    IMPRESSION:  1.  Right hip trochanteric bursitis  2.  Primary arthritis, right knee  3.  History of left total knee arthroplasty    PLAN:  Discussed with the patient findings above.  Reviewed x-rays with her.  Regarding hip she has some age-appropriate arthritic changes but is asymptomatic from this on her examination.  She does have symptoms of bursitis and likely some underlying abductor tendinitis causing her current symptom presentation and is failed to improve with conservative management.  She has had previous corticosteroid injections with good relief and would like to try another injection today if possible.  See below for injection details.  Regarding the bilateral knees reviewed that her left knee x-rays demonstrate no signs of loosening failure or left knee replacement and the shifting sensation she feels is likely from a flatfoot and she would like to get this evaluated by one of our foot and ankle partners.  Regarding her right knee she does have some moderate arthritic changes and may consider corticosteroid injection in the future if she would like.  Patient is currently in physical therapy to continue working with therapy as well for her current conditions.  Recommend continued conditioning of the lower extremities for right hip with focus on abductor strengthening.  Will follow-up with me as needed otherwise.    L Inj/Asp: R greater trochanteric bursa on 2/6/2024 1:09 PM  Indications: pain  Details: 25 G needle, lateral approach  Medications: 80 mg triamcinolone acetonide 40 mg/mL  Outcome: tolerated well, no immediate complications  Procedure, treatment alternatives, risks and benefits explained, specific risks discussed. Consent was given by the patient.             SUBJECTIVE  CHIEF COMPLAINT:   Chief Complaint    Patient presents with    Right Hip - Pain    Right Knee - Pain    Left Knee - Pain        HPI: Jie Crocker is a 78 y.o. patient who has come back to see us for multiple problems. Jie Crocker has had progressive problems with the right hip over the past 1 year. She also continues to have pain in both knees.  We saw her last in July of 2022 and she just tried physical therapy.  She does feel like with her left knee that her leg is shifting but also states that she has difficulty walking on flat ground with her feet and is unsure if it is related to this.  The pain in the right hip is low, side and worse with laying on the right side.  They do not report any trauma. They do not report any constant or progressive numbness or tingling in their legs. Their symptoms are interfering with activities which include pain in her groin area and on the lateral side that is much worse when she sleeps at night and walking.     FUNCTIONAL STATUS: not limited.  AMBULATORY STATUS:  independent  PREVIOUS TREATMENTS: Therapy about 1-2 years ago with no improvement  HISTORY OF SURGERY ON AFFECTED HIP(S): No   BACK PAIN REPORTED: Yes       REVIEW OF SYSTEMS  Constitutional: See HPI for pain assessment, No significant weight loss, recent trauma  Cardiovascular: No chest pain, shortness of breath  Respiratory: No difficulty breathing, cough  Gastrointestinal: No nausea, vomiting, diarrhea, constipation  Musculoskeletal: Noted in HPI, positive for pain, restricted motion, stiffness  Integumentary: No rashes, easy bruising, redness   Neurological: no numbness or tingling in extremities, no gait disturbances   Psychiatric: No mood changes, memory changes, social issues  Heme/Lymph: no excessive swelling, easy bruising, excessive bleeding  ENT: No hearing changes  Eyes: No vision changes    Past Medical History:   Diagnosis Date    Allergic contact dermatitis due to plants, except food 07/09/2021    Contact dermatitis due to poison ivy     Cervical disc disorder, unspecified, unspecified cervical region 08/05/2020    Cervical disc disease    Deficiency of other specified B group vitamins     Vitamin B 12 deficiency    Hypermobility syndrome 08/31/2020    Hypermobile joint syndrome of ankle    Hypothyroidism, unspecified 12/22/2020    Primary hypothyroidism    Localized edema 08/27/2020    Bilateral edema of lower extremity    Low back pain, unspecified 09/29/2020    Chronic midline low back pain, unspecified whether sciatica present    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    Chronic fatigue syndrome with fibromyalgia    Neuralgia and neuritis, unspecified 08/05/2020    Thoracic neuralgia    Obesity, unspecified 02/13/2020    Class 1 obesity without serious comorbidity with body mass index (BMI) of 32.0 to 32.9 in adult, unspecified obesity type    Other fecal abnormalities 11/15/2021    Positive colorectal cancer screening using Cologuard test    Other low back pain 07/05/2022    Intractable low back pain    Other shoulder lesions, right shoulder 02/13/2020    Rotator cuff tendonitis, right    Other symptoms and signs involving the musculoskeletal system 12/13/2021    Weakness of lower extremity    Other symptoms and signs involving the musculoskeletal system 10/27/2021    Weakness of left upper extremity    Other thyrotoxicosis without thyrotoxic crisis or storm 01/11/2021    Hyperthyroidism with Hashimoto disease    Pain in left elbow 09/16/2021    Elbow pain, left    Pain in left knee 12/13/2021    Acute pain of left knee    Pain in right finger(s) 01/03/2017    Thumb pain, right    Pain in right hip 01/03/2017    Pain of right hip joint    Pain in thoracic spine 08/05/2020    Chronic midline thoracic back pain    Pain in thoracic spine 08/05/2020    Chronic bilateral thoracic back pain    Personal history of diseases of the blood and blood-forming organs and certain disorders involving the immune mechanism 02/10/2022     History of anemia    Personal history of other diseases of the musculoskeletal system and connective tissue 03/08/2022    History of joint pain    Personal history of other diseases of the musculoskeletal system and connective tissue 07/25/2022    History of muscle spasm    Personal history of other diseases of the musculoskeletal system and connective tissue     History of back pain    Personal history of other diseases of the respiratory system     History of chronic obstructive lung disease    Personal history of other endocrine, nutritional and metabolic disease 09/03/2020    History of hyperthyroidism    Personal history of other endocrine, nutritional and metabolic disease 07/10/2021    History of hypothyroidism    Personal history of other endocrine, nutritional and metabolic disease 08/31/2020    History of vitamin B deficiency    Personal history of other endocrine, nutritional and metabolic disease     History of hypothyroidism    Personal history of other specified conditions 07/05/2022    History of balance disorder    Personal history of other specified conditions 06/24/2017    History of abdominal pain    Radiculopathy, cervical region 11/08/2021    Cervical neuritis    Repeated falls 07/05/2022    Multiple falls    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    Severe persistent asthma, uncomplicated 07/21/2021    Severe persistent asthmatic bronchitis without complication    Unspecified abdominal pain 11/15/2021    Intermittent abdominal pain    Unspecified dislocation of left ulnohumeral joint, initial encounter 10/06/2021    Dislocation of left elbow, initial encounter    Venous insufficiency (chronic) (peripheral) 02/14/2022    Chronic venous insufficiency        Allergies   Allergen Reactions    Mold Shortness of breath    Corticosteroids (Glucocorticoids) Other     Leg cramps    Darvocet A500 [Propoxyphene N-Acetaminophen] Blurred vision    Ketamine Hallucinations     Pollen Extracts Unknown    Propoxyphene-Acetaminophen Other    Adhesive Tape-Silicones Rash    Piroxicam Other     burning to eyes and swelling        Past Surgical History:   Procedure Laterality Date    ABDOMINAL SACROCOLPOPEXY N/A 12/13/2023    laparoscopic with lysis of adhesions    OTHER SURGICAL HISTORY  09/03/2020    Laparoscopic hysterectomy    OTHER SURGICAL HISTORY  09/03/2020    Lumpectomy    OTHER SURGICAL HISTORY  09/03/2020    Tonsillectomy    OTHER SURGICAL HISTORY  09/03/2020    Cystoscopy    OTHER SURGICAL HISTORY  09/03/2020    Corneal lasik    OTHER SURGICAL HISTORY  11/13/2019    left        Family History   Problem Relation Name Age of Onset    Heart attack Mother          age 52    Heart attack Other      Dementia Other          Social History     Socioeconomic History    Marital status:      Spouse name: Not on file    Number of children: Not on file    Years of education: Not on file    Highest education level: Not on file   Occupational History    Not on file   Tobacco Use    Smoking status: Never    Smokeless tobacco: Never   Vaping Use    Vaping Use: Never used   Substance and Sexual Activity    Alcohol use: Not Currently    Drug use: Never    Sexual activity: Not Currently   Other Topics Concern    Not on file   Social History Narrative    Not on file     Social Determinants of Health     Financial Resource Strain: Low Risk  (12/13/2023)    Overall Financial Resource Strain (CARDIA)     Difficulty of Paying Living Expenses: Not hard at all   Food Insecurity: Not on file   Transportation Needs: No Transportation Needs (12/13/2023)    PRAPARE - Transportation     Lack of Transportation (Medical): No     Lack of Transportation (Non-Medical): No   Physical Activity: Insufficiently Active (12/13/2023)    Exercise Vital Sign     Days of Exercise per Week: 4 days     Minutes of Exercise per Session: 30 min   Stress: Not on file   Social Connections: Not on file   Intimate Partner  Violence: Not on file   Housing Stability: Low Risk  (12/13/2023)    Housing Stability Vital Sign     Unable to Pay for Housing in the Last Year: No     Number of Places Lived in the Last Year: 1     Unstable Housing in the Last Year: No        CURRENT MEDICATIONS:   Current Outpatient Medications   Medication Sig Dispense Refill    albuterol 90 mcg/actuation inhaler Inhale 2 puffs every 6 hours if needed for wheezing.      budesonide-formoteroL (Symbicort) 160-4.5 mcg/actuation inhaler Inhale 2 puffs 2 times a day. (Patient not taking: Reported on 12/13/2023) 90 each 3    cholecalciferol (Vitamin D-3) 50 MCG (2000 UT) tablet Take 1 tablet (50 mcg) by mouth once daily.      cyanocobalamin (Vitamin B-12) 1,000 mcg/mL injection Inject 1 mL (1,000 mcg) into the muscle every 30 (thirty) days.      cyanocobalamin-cobamamide (B-12 Plus) 5,000-100 mcg tablet, sublingual Place 1 tablet under the tongue once daily.      estradiol (Estrace) 0.01 % (0.1 mg/gram) vaginal cream Insert 0.5 Applicatorfuls (2 g) into the vagina 1 (one) time per week.      Lactobacillus acidophilus (Acidophilus) capsule Take 1 capsule by mouth once daily.      levothyroxine (Synthroid, Levoxyl) 112 mcg tablet Take 1 tablet (112 mcg) by mouth once daily. 30 tablet 11    magnesium chloride 71.5 mg tablet,delayed release (DR/EC) Take 2 tablets (143 mg) by mouth once daily.      polyethylene glycol (Glycolax, Miralax) 17 gram/dose powder Take 17 g by mouth once daily. 510 g 0    tamsulosin (Flomax) 0.4 mg 24 hr capsule Take 3 days before surgery and 7 days after 10 capsule 0    tamsulosin (Flomax) 0.4 mg 24 hr capsule Take 3 days before surgery and 7 days after 10 capsule 0     No current facility-administered medications for this visit.        OBJECTIVE    PHYSICAL EXAM      12/13/2023     6:48 PM 12/13/2023     7:05 PM 12/13/2023    10:56 PM 12/13/2023    11:00 PM 12/14/2023     3:00 AM 12/14/2023     5:00 AM 12/14/2023     9:03 AM   Vitals   Systolic  "118 117  100 85 100 112   Diastolic 62 77  62 52 64 70   Heart Rate 64 70  69 56 52 60   Temp  35.8 °C (96.4 °F)  36.1 °C (97 °F) 35.6 °C (96.1 °F) 36.5 °C (97.7 °F)    Resp 16 14 18 16 16 16    Height (in)   1.727 m (5' 7.99\")       Weight (lb)   197.09       BMI   29.97 kg/m2       BSA (m2)   2.07 m2          There is no height or weight on file to calculate BMI.    GENERAL: A/Ox3, NAD. Appears healthy, well nourished  PSYCHIATRIC: Mood stable, appropriate memory recall  EYES: EOM intact, no scleral icterus  CARDIAC: regular rate  LUNGS: Breathing non-labored  SKIN: no erythema, rashes, or ecchymoses     MUSCULOSKELETAL:  Laterality: right Hip Exam  - ROM, Extension: full, no flexion contracture  - Strength: Abduction 5/5, Flexion 5/5. Abductor pain against resistance: No   - Palpation:  TTP along greater trochanter, posterolateral border  - Log roll/IR exam: non painful, good IR  - Straight leg raise: negative  - EHL/PF/DF motor intact  - Gait: normal  - Special Tests: negative Charley    GENERAL: A/Ox3, NAD. Appears healthy, well nourished  PSYCHIATRIC: Mood stable, appropriate memory recall  EYES: EOM intact, no scleral icterus  CARDIAC: regular rate  LUNGS: Breathing non-labored  SKIN: no erythema, rashes, or ecchymoses     MUSCULOSKELETAL:  Laterality: right Knee Exam  - Alignment: Fully correctible varus deformity  - ROM:  0 - >130deg  - Effusion: none  - Strength: knee extension and flexion 5/5, EHL/PF/DF motor intact  - Palpation: TTP along medial joint line.  No tenderness to palpation along bilateral patellar facets or lateral joint line  - Stability: Anterior/Posterior stable, varus/valgus stable  - Gait: normal  - Hip Exam: flexion to 100+ degrees, full extension, internal/external rotation adequate, and no pain with log roll  - Special Tests: No pain with patellar compression, negative Lachman, negative posterior drawer.  Negative pivot shift    NEUROVASCULAR:  - Neurovascular Status: sensation intact to " light touch distally  - Capillary refill brisk at extremities, Bilateral dorsalis pedis pulse 2+           IMAGING:  Multiple views of the right hip and pelvis reviewed which demonstrates mild arthritic changes in the right hip.  Multiple views of the bilateral knees reviewed which demonstrates cemented left total knee arthroplasty with no signs of loosening or failure.  Right knee with mod arthritic changes noted confined to the medial compartment with joint space narrowing, subchondral sclerosis, marginal osteophytic change and varus deformity x-rays were personally reviewed and interpreted by me.  Radiology reports were reviewed by me as well, if readily available at the time.        Willy Serrato DO  Attending Surgeon  Joint Replacement and Adult Reconstructive Surgery  [place], [place]

## 2024-02-08 ENCOUNTER — TREATMENT (OUTPATIENT)
Dept: PHYSICAL THERAPY | Facility: CLINIC | Age: 79
End: 2024-02-08
Payer: MEDICARE

## 2024-02-08 DIAGNOSIS — M70.61 GREATER TROCHANTERIC BURSITIS OF RIGHT HIP: ICD-10-CM

## 2024-02-08 DIAGNOSIS — M54.6 DORSALGIA OF THORACIC REGION: ICD-10-CM

## 2024-02-08 DIAGNOSIS — R53.1 GENERAL WEAKNESS: ICD-10-CM

## 2024-02-08 PROCEDURE — 97110 THERAPEUTIC EXERCISES: CPT | Mod: GP,CQ

## 2024-02-08 ASSESSMENT — PAIN SCALES - GENERAL: PAINLEVEL_OUTOF10: 0 - NO PAIN

## 2024-02-08 NOTE — PROGRESS NOTES
"Physical Therapy    Physical Therapy Treatment    Patient Name: Jie Crocker  MRN: [id number]  Today's Date: 2/8/2024  Time Calculation  Start Time: 0330  Stop Time: 0415  Time Calculation (min): 45 min      Assessment:   Pt. completed her exercise program without c/o increased hip / back pain but she was fatigued.    Plan:   Progress postural mm strengthening; core and hip strengthening at subsymptom levels.     Current Problem  1. Greater trochanteric bursitis of right hip  Follow Up In Physical Therapy      2. Dorsalgia of thoracic region  Follow Up In Physical Therapy      3. General weakness  Follow Up In Physical Therapy          General    Subjective    No change in right hip pain since getting cortisone injection earlier this week. Pt. has been going to the chiropractor once a week to get adjusted. Chiropractor has been helpful.    Precautions   Precautions  STEADI Fall Risk Score (The score of 4 or more indicates an increased risk of falling): 3  Precautions Comment: Fibromyalgia     Pain  Pain 2  Pain Score 2: 0 - No pain  Pain Location 2: Hip  Pain Orientation 2: Right    Objective   Scapular retraction and \"no money\"    Gait - antalgic limp    Added 3 way hip and scapular retraction    Treatments:  EXERCISES Date 1/29/24 Date 1/31/24 Date 2/6/24 Date 2/8/24    Visit 2 Visit 3 Visit 4 Visit 5   Nustep   L3 x 7 min  L3 x 15 min L3 12 min L4 13 min   3 way hip     0# 1x15 ea   Single knee to chest stretch  2 x 20 sec  2 x 20 sec 2x20 ea 3x20\"H   Seated hamstring stretch              Hooklying gluteal sets  10 x 5 sec   10 x 5 sec 10x5 sec    Bridging  15 x 5 sec  15 x 5 sec 15x5 sec           Hooklying isometric hip abd (with belt)  10 x 5 sec   10 x 5 sec 15x 5 sec           Seated repeated trunk ext   10 reps   15x 15x          Tband mid rows  Orange x 15 Blue x20 ----    Tband bilateral pulldowns  Orange x 15 Blue x 20 ----    Scapular retraction    5\"H x20   \"No monies\"   Orange  2 x 10 ---- 5\"H x20 no " resistance          HEP  updated     HEP/ PT Education:   Access Code: [email or web address]  URL: [email or web address]  Date: 01/31/2024  Prepared by: Kaitlynn Brewer    Exercises  - Hooklying Gluteal Sets  - 1 x daily - 4 x weekly - 10 reps - 5 sec hold  - Hooklying Isometric Hip Abduction with Belt  - 1 x daily - 4 x weekly - 10 reps - 5 sec hold  - Supine Bridge with Gluteal Set and Spinal Articulation  - 1 x daily - 4 x weekly - 15 reps - 5 sec hold  - Standing Shoulder External Rotation with Resistance  - 1 x daily - 4 x weekly - 2 sets - 10 reps  - Standing Shoulder Row with Anchored Resistance  - 1 x daily - 4 x weekly - 2 sets - 10 reps  - Shoulder extension with resistance - Neutral  - 1 x daily - 4 x weekly - 2 sets - 10 reps    Goals:  Patient Goal: Wants to have less pain and be able to get down on knees to garden.     PT Goals:   Will be independent with symptom management strategies and report mid back pain no worse than 4/10 with standing/ sitting up to 20 min at a time for ADLs, IADLs, quilting. Low back and right hip pain no worse than 4/10 with turning over in bed; when first waking; and with walking up to 15 min.  Right glut med/ max strength improved to at least 4/5; core/ postural mm at least 4+/5 for improved standing tolerance for cooking/food prep; improved painfree rolling in bed; and improved ability to kneel/ partial squat for gardening; household IADLs.   Will improve outcome measure score on LEFS to at least 45/80 for improved function with selfcare, ADL's, IADLs  Will demonstrate and consistently use proper posture and body mechanics for symptom management with all daily activities  Will be independent and compliant with appropriate HEP for carryover of PT to meet all goals.

## 2024-02-13 ENCOUNTER — APPOINTMENT (OUTPATIENT)
Dept: PHYSICAL THERAPY | Facility: CLINIC | Age: 79
End: 2024-02-13
Payer: MEDICARE

## 2024-02-15 ENCOUNTER — APPOINTMENT (OUTPATIENT)
Dept: ORTHOPEDIC SURGERY | Facility: CLINIC | Age: 79
End: 2024-02-15
Payer: MEDICARE

## 2024-02-15 ENCOUNTER — APPOINTMENT (OUTPATIENT)
Dept: RADIOLOGY | Facility: CLINIC | Age: 79
End: 2024-02-15
Payer: MEDICARE

## 2024-02-15 ENCOUNTER — TREATMENT (OUTPATIENT)
Dept: PHYSICAL THERAPY | Facility: CLINIC | Age: 79
End: 2024-02-15
Payer: MEDICARE

## 2024-02-15 DIAGNOSIS — R53.1 GENERAL WEAKNESS: ICD-10-CM

## 2024-02-15 DIAGNOSIS — M70.61 GREATER TROCHANTERIC BURSITIS OF RIGHT HIP: ICD-10-CM

## 2024-02-15 DIAGNOSIS — M54.6 DORSALGIA OF THORACIC REGION: ICD-10-CM

## 2024-02-15 PROCEDURE — 97110 THERAPEUTIC EXERCISES: CPT | Mod: GP,CQ

## 2024-02-15 ASSESSMENT — PAIN - FUNCTIONAL ASSESSMENT: PAIN_FUNCTIONAL_ASSESSMENT: 0-10

## 2024-02-15 ASSESSMENT — PAIN SCALES - GENERAL: PAINLEVEL_OUTOF10: 3

## 2024-02-15 NOTE — PROGRESS NOTES
"Physical Therapy    Physical Therapy Treatment    Patient Name: Jie Crocker  MRN: [id number]  Today's Date: 2/15/2024  Time Calculation  Start Time: 1015  Stop Time: 1055  Time Calculation (min): 40 min      Assessment:   Verbal cueing needed to correct her posture and technique with seated \"no monies and hold ups\". Unable to do greater than 5 reps on the back extension machine secondary to increased right hip pain.   Right hip strength has improved since IE.  Issued heel lift.     Plan:   Progress postural mm strengthening; core and hip strengthening at subsymptom levels.     Current Problem  1. Greater trochanteric bursitis of right hip  Follow Up In Physical Therapy      2. Dorsalgia of thoracic region  Follow Up In Physical Therapy      3. General weakness  Follow Up In Physical Therapy          General    Subjective    Pt. has not been sleeping well secondary to \"cramping\" in bilateral legs, ankles and feet. She is \"exhausted\" today.    Precautions   Precautions  STEADI Fall Risk Score (The score of 4 or more indicates an increased risk of falling): 3  Precautions Comment: Fibromyalgia     Pain  Pain Assessment  Pain Assessment: 0-10  Pain Score: 3  Pain Location: Hip  Pain Orientation: Right    Objective   Gait - antalgic limp    MMT Right hip  Hip flexion 4+/5  Seated hip abduction 4+/5  Seated hip adduction 5/5    Treatments:  EXERCISES Date 2/15/24 Date  Date  Date     Visit 6 Visit  Visit  Visit    Nustep   L4 x 13 min      3 way hip  0# 2x10 ea      Single knee to chest stretch        Seated hamstring stretch              Hooklying gluteal sets       Bridging               Seated resisted hip abduction  Blue 20x             Seated repeated trunk ext   10 reps       Back extension machine 50# 5x P! R hip      Tband mid rows  -----      Tband bilateral pulldowns  -----      Scapular retraction       \"No monies\" 2x10 3\"H      \" Hold up\" 2x10 3\"H      HEP       Goals:  Patient Goal: Wants to have less pain " and be able to get down on knees to garden.     PT Goals:   Will be independent with symptom management strategies and report mid back pain no worse than 4/10 with standing/ sitting up to 20 min at a time for ADLs, IADLs, quilting. Low back and right hip pain no worse than 4/10 with turning over in bed; when first waking; and with walking up to 15 min.  Right glut med/ max strength improved to at least 4/5; core/ postural mm at least 4+/5 for improved standing tolerance for cooking/food prep; improved painfree rolling in bed; and improved ability to kneel/ partial squat for gardening; household IADLs.   Will improve outcome measure score on LEFS to at least 45/80 for improved function with selfcare, ADL's, IADLs  Will demonstrate and consistently use proper posture and body mechanics for symptom management with all daily activities  Will be independent and compliant with appropriate HEP for carryover of PT to meet all goals.

## 2024-02-20 ENCOUNTER — TREATMENT (OUTPATIENT)
Dept: PHYSICAL THERAPY | Facility: CLINIC | Age: 79
End: 2024-02-20
Payer: MEDICARE

## 2024-02-20 DIAGNOSIS — M54.6 DORSALGIA OF THORACIC REGION: ICD-10-CM

## 2024-02-20 DIAGNOSIS — R53.1 GENERAL WEAKNESS: ICD-10-CM

## 2024-02-20 DIAGNOSIS — M70.61 GREATER TROCHANTERIC BURSITIS OF RIGHT HIP: ICD-10-CM

## 2024-02-20 PROCEDURE — 97110 THERAPEUTIC EXERCISES: CPT | Mod: GP,CQ

## 2024-02-20 ASSESSMENT — PAIN SCALES - GENERAL: PAINLEVEL_OUTOF10: 4

## 2024-02-20 ASSESSMENT — PAIN - FUNCTIONAL ASSESSMENT: PAIN_FUNCTIONAL_ASSESSMENT: 0-10

## 2024-02-20 NOTE — PROGRESS NOTES
"Physical Therapy    Physical Therapy Treatment    Patient Name: Jie Crocker  MRN: [id number]  Today's Date: 2/20/2024  Time Calculation  Start Time: 1015  Stop Time: 1055  Time Calculation (min): 40 min      Assessment:   Improved right hip strength since beginning PT.      Plan:   Progress postural mm strengthening; core and hip strengthening at subsymptom levels.      Reassess next visit.    Current Problem  1. Greater trochanteric bursitis of right hip  Follow Up In Physical Therapy      2. Dorsalgia of thoracic region  Follow Up In Physical Therapy      3. General weakness  Follow Up In Physical Therapy          General    Subjective    Pt. reports that she continues to get leg cramps at night. She will call her PCP today to see if he can order blood work to see if her \"electrolytes are off\". Body cramps do increase after she gets steroid injections.    Precautions   Precautions  STEADI Fall Risk Score (The score of 4 or more indicates an increased risk of falling): 3  Precautions Comment: Fibromyalgia     Pain  Pain Assessment  Pain Assessment: 0-10  Pain Score: 4  Pain Location: Hip  Pain Orientation: Right    Objective   Gait - antalgic limp    MMT Right hip  Hip flexion 4+/5  Seated hip abduction 4+/5  Seated hip adduction 5/5    Added resistance with 3-way hip exercises, shuttle DLP/SLP    Treatments:  EXERCISES Date 2/15/24 Date 2/20/24 Date  Date     Visit 6 Visit  Visit  Visit    Nustep   L4 x 13 min L4 13 min     3 way hip  0# 2x10 ea 20# 2x10 ea     Single knee to chest stretch        Seated hamstring stretch              Hooklying gluteal sets       Bridging               Seated resisted hip abduction  Blue 20x             Seated repeated trunk ext   10 reps  10x     Back extension machine 50# 5x P! R hip ----     Shuttle DLP   4B 30x     Shuttle SLP   2x10 ea     Scapular retraction       \"No monies\" 2x10 3\"H 20x 5\"H     \" Hold up\" 2x10 3\"H 20x 5\"H     HEP         Patient Goal: Wants to have less " pain and be able to get down on knees to garden.     PT Goals:   Will be independent with symptom management strategies and report mid back pain no worse than 4/10 with standing/ sitting up to 20 min at a time for ADLs, IADLs, quilting. Low back and right hip pain no worse than 4/10 with turning over in bed; when first waking; and with walking up to 15 min.  Right glut med/ max strength improved to at least 4/5; core/ postural mm at least 4+/5 for improved standing tolerance for cooking/food prep; improved painfree rolling in bed; and improved ability to kneel/ partial squat for gardening; household IADLs.   Will improve outcome measure score on LEFS to at least 45/80 for improved function with selfcare, ADL's, IADLs  Will demonstrate and consistently use proper posture and body mechanics for symptom management with all daily activities  Will be independent and compliant with appropriate HEP for carryover of PT to meet all goals.

## 2024-02-23 ENCOUNTER — TREATMENT (OUTPATIENT)
Dept: PHYSICAL THERAPY | Facility: CLINIC | Age: 79
End: 2024-02-23
Payer: MEDICARE

## 2024-02-23 DIAGNOSIS — M70.61 GREATER TROCHANTERIC BURSITIS OF RIGHT HIP: ICD-10-CM

## 2024-02-23 DIAGNOSIS — R53.1 GENERAL WEAKNESS: ICD-10-CM

## 2024-02-23 DIAGNOSIS — M54.6 DORSALGIA OF THORACIC REGION: ICD-10-CM

## 2024-02-23 PROCEDURE — 97530 THERAPEUTIC ACTIVITIES: CPT | Mod: GP

## 2024-02-23 PROCEDURE — 97110 THERAPEUTIC EXERCISES: CPT | Mod: GP

## 2024-02-23 ASSESSMENT — PAIN SCALES - GENERAL: PAINLEVEL_OUTOF10: 8

## 2024-02-23 ASSESSMENT — PAIN - FUNCTIONAL ASSESSMENT: PAIN_FUNCTIONAL_ASSESSMENT: 0-10

## 2024-02-23 NOTE — PROGRESS NOTES
Physical Therapy    Physical Therapy Treatment/ Recheck    Patient Name: Jie Crocker  MRN: [id number]  Today's Date: 2/23/2024  Time Calculation  Start Time: 1010  Stop Time: 1055  Time Calculation (min): 45 min      Assessment:    Jie has attended 8 skilled outpatient PT visits to address her low back and hip pain. She continues to have right sided low back pain, but decreased right outer hip pain since her recent cortisone injection. She exhibits an improved upright standing posture, but still with significant gait deviations due to her hip weakness; and functional quad weakness. She will benefit from continued skilled PT to work to improve her hip and quad strength for improved gait mechanics and improved pain control with her daily activities.     Plan:   PT 2 times/ week x 4 more weeks for continued focus on hip / quad strengthening; for improved gait mechanics; posture and body mechanics education for symptom management with daily activities.     Current Problem  1. Greater trochanteric bursitis of right hip  Follow Up In Physical Therapy      2. Dorsalgia of thoracic region  Follow Up In Physical Therapy      3. General weakness  Follow Up In Physical Therapy          General     General  General Comment: Is regretting having the injection 2 1/2 weeks  ago- she is having side effects and not sleeping well and having muscle cramps . The outer hip pain is better, but her right Lumbo sacral area is still hurting. She does feel like the heel lift is helping somewhat. Pain still wakes her during the night.  Continues to see her chiropractor. States she is mostly working on stretches at home.     Subjective    Precautions  Precautions  STEADI Fall Risk Score (The score of 4 or more indicates an increased risk of falling): 3  Precautions Comment: Fibromyalgia    Pain  Pain Assessment  Pain Assessment: 0-10  Pain Score: 8  Pain Location:  (lumbosacral region)  Pain Orientation: Right    Objective   Standing in a  "more upright posture  Iliac crest heights level in standing ( with heel lift in right shoe)    Lumbar AROM  Lumbar flexion: (60°): WFL- painfree  Lumbar extension (25°): min dec - min pain across low back  Lumbar sidebend right (25°): WFL- pulls in low back  Lumbar sidebend left (25°): WFL- pulls on right side  Hip AROM   Grossly WFL  Hip PROM  Hip PROM WFL:  (WFL except for IR which is mod limited bilaterally)  \"Clunking\" of right hip/ at greater trochanter palpated with hip IR/ER.- still present upon recheck (2/23/24)      Specific Lower Extremity MMT  R Iliopsoas: (5/5): 4/5  L Iliopsoas: (5/5): 4+/5  R Gluteals (prone): (5/5): 4-/5  L Gluteals (prone): (5/5): 4/5  R Gluteals (sidelying): (5/5): 2+/5  L Gluteals (sidelying): (5/5): 4/5  R Hip External Rotation: (5/5): 4+/5  L Hip External Rotation: (5/5): 4+/5     R Quads:  4+/5; poor eccentric control; some medial knee pain with eccentric quad work.   L Quads: 4+/5  R Hamstrings: 4+/5  L Hamstrings: 4+/5     Abdominals: 3+/5  Trunk ext:  4/5  Scapular/ mid back mm: grossly 4/5     Special Tests  Supine SLR: (Negative): Negative bilaterally  Charley’s Test: (Negative): Positive R/L  Other: Slump negative bilaterally; Hip scour neg bilat; neg FRAN and FADIR bilat.     Gait  Gait Comment: Ambulates with limp on right side; less flexed at trunk ; still with trendelenburg pattern on right during stance phase.  Reports step to gait pattern on stairs and definite use of rail.  Outcome Measures:  Other Measures  Lower Extremity Funtional Score (LEFS): 35/80    Treatments:  EXERCISES Date 2/15/24 Date 2/20/24 Date 2/23/24 Date     Visit 6 Visit  Visit 8 Visit    Recheck for progress    15 min    Nustep   L4 x 13 min L4 13 min  (Pt declined today)    3 way hip  0# 2x10 ea 20# 2x10 ea  20#  2 x 10 ea way;   Ea leg    Single knee to chest stretch        Seated hamstring stretch              Hooklying gluteal sets       Bridging               Seated resisted hip abduction  " "Blue 20x             Seated repeated trunk ext   10 reps  10x     Back extension machine 50# 5x P! R hip ---- ------    Shuttle DLP   4B 30x   4B  2x 30    Shuttle SLP   2x10 ea   2B  2 x 10    Scapular retraction       \"No monies\" 2x10 3\"H 20x 5\"H     \" Hold up\" 2x10 3\"H 20x 5\"H     HEP         Patient Goal: Wants to have less pain and be able to get down on knees to garden. - not met    PT Goals: (as of 2/23/24)  Will be independent with symptom management strategies and report mid back pain no worse than 4/10 with standing/ sitting up to 20 min at a time for ADLs, IADLs, quilting. Low back and right hip pain no worse than 4/10 with turning over in bed; when first waking; and with walking up to 15 min.- not met  Right glut med/ max strength improved to at least 4/5; core/ postural mm at least 4+/5 for improved standing tolerance for cooking/food prep; improved painfree rolling in bed; and improved ability to kneel/ partial squat for gardening; household IADLs. - not met  Will improve outcome measure score on LEFS to at least 45/80 for improved function with selfcare, ADL's, IADLs- not met  Will demonstrate and consistently use proper posture and body mechanics for symptom management with all daily activities- partially met  Will be independent and compliant with appropriate HEP for carryover of PT to meet all goals.- not met    "

## 2024-02-27 NOTE — SIGNIFICANT EVENT
Cristóbal Payan Jr. D/C'd.  Discharge instructions including s/s to return to ED, follow up appointments, hydration importance and wound care education  provided to pt.    Pt verbalized understanding with no further questions and concerns.    Copy of discharge provided to pt.  Signed copy in chart.    Prescription for bacitracin provided to pt.   Pt ambulates out of department; pt in NAD, awake, alert, interactive and age appropriate.  VS BP (!) 144/66   Pulse 64   Temp 36.7 °C (98.1 °F) (Temporal)   Resp 20   Ht 1.829 m (6')   Wt 116 kg (255 lb 4.7 oz)   SpO2 96%   BMI 34.62 kg/m²        Pt appeaars to be sleeping. Resp easy and reg.

## 2024-03-05 ENCOUNTER — APPOINTMENT (OUTPATIENT)
Dept: PHYSICAL THERAPY | Facility: CLINIC | Age: 79
End: 2024-03-05
Payer: MEDICARE

## 2024-03-07 ENCOUNTER — APPOINTMENT (OUTPATIENT)
Dept: PHYSICAL THERAPY | Facility: CLINIC | Age: 79
End: 2024-03-07
Payer: MEDICARE

## 2024-03-12 ENCOUNTER — TREATMENT (OUTPATIENT)
Dept: PHYSICAL THERAPY | Facility: CLINIC | Age: 79
End: 2024-03-12
Payer: MEDICARE

## 2024-03-12 DIAGNOSIS — M70.61 GREATER TROCHANTERIC BURSITIS OF RIGHT HIP: ICD-10-CM

## 2024-03-12 DIAGNOSIS — R53.1 GENERAL WEAKNESS: ICD-10-CM

## 2024-03-12 DIAGNOSIS — M54.6 DORSALGIA OF THORACIC REGION: ICD-10-CM

## 2024-03-12 PROCEDURE — 97110 THERAPEUTIC EXERCISES: CPT | Mod: GP,CQ

## 2024-03-12 ASSESSMENT — PAIN SCALES - GENERAL: PAINLEVEL_OUTOF10: 2

## 2024-03-12 ASSESSMENT — PAIN - FUNCTIONAL ASSESSMENT: PAIN_FUNCTIONAL_ASSESSMENT: 0-10

## 2024-03-12 NOTE — PROGRESS NOTES
"Physical Therapy    Physical Therapy Treatment    Patient Name: Jie Crocker  MRN: [id number]  Today's Date: 3/12/2024  Time Calculation  Start Time: 1015  Stop Time: 1100  Time Calculation (min): 45 min      Assessment:    Pt. tolerated added exercises well. She was fatigued but did not have any hip or back pain.  Pt. reports that she was \"walking better\" at the end of her session. Moderate verbal cueing needed to perform Q-hip exercises with the correct form and technique.    Plan:   PT 2 times/ week x 4 more weeks for continued focus on hip / quad strengthening; for improved gait mechanics; posture and body mechanics education for symptom management with daily activities.     Current Problem  1. Greater trochanteric bursitis of right hip  Follow Up In Physical Therapy      2. Dorsalgia of thoracic region  Follow Up In Physical Therapy      3. General weakness  Follow Up In Physical Therapy          General    Subjective    Pt. has a headache this morning. Her hip and mid back feel \"ok\". Pt. has an appointment with the chiropractor today.    Precautions   Precautions  STEADI Fall Risk Score (The score of 4 or more indicates an increased risk of falling): 3  Precautions Comment: Fibromyalgia    Pain  Pain Assessment  Pain Assessment: 0-10  Pain Score: 2  Pain Location: Hip  Pain Orientation: Right    Objective   R Quads:  4+/5; poor eccentric control; some medial knee pain with eccentric quad work.   L Quads: 4+/5  R Hamstrings: 4+/5  L Hamstrings: 4+/5    Added ham curl and quad machines forward step ups     Gait  Gait Comment: Ambulates with limp on right side; less flexed at trunk ; still with trendelenburg pattern on right during stance phase.     Treatments:  EXERCISES Date 2/15/24 Date 2/20/24 Date 2/23/24 Date 3/12/24    Visit 6 Visit  Visit 8 Visit 9   Recheck for progress    15 min    Nustep   L4 x 13 min L4 13 min  (Pt declined today) L4 10 min   3 way hip  0# 2x10 ea 20# 2x10 ea  20#  2 x 10 ea way;   Ea " "leg 20# 2x10ea  Q-hip flexion, abduction, extension   Single knee to chest stretch        Seated hamstring stretch       Forward machine    6\" 1x15 ea   Hamcurl machine     35# 30x   Knee extension machine     15# 30x          Seated resisted hip abduction  Blue 20x             Seated repeated trunk ext   10 reps  10x  20x   Back extension machine 50# 5x P! R hip ---- ------    Shuttle DLP   4B 30x   4B  2x 30 5B 2x30   Shuttle SLP   2x10 ea   2B  2 x 10 3B 2x15 ea   Scapular retraction       \"No monies\" 2x10 3\"H 20x 5\"H     \" Hold up\" 2x10 3\"H 20x 5\"H     HEP         Patient Goal: Wants to have less pain and be able to get down on knees to garden. - not met    PT Goals: (as of 2/23/24)  Will be independent with symptom management strategies and report mid back pain no worse than 4/10 with standing/ sitting up to 20 min at a time for ADLs, IADLs, quilting. Low back and right hip pain no worse than 4/10 with turning over in bed; when first waking; and with walking up to 15 min.- not met  Right glut med/ max strength improved to at least 4/5; core/ postural mm at least 4+/5 for improved standing tolerance for cooking/food prep; improved painfree rolling in bed; and improved ability to kneel/ partial squat for gardening; household IADLs. - not met  Will improve outcome measure score on LEFS to at least 45/80 for improved function with selfcare, ADL's, IADLs- not met  Will demonstrate and consistently use proper posture and body mechanics for symptom management with all daily activities- partially met  Will be independent and compliant with appropriate HEP for carryover of PT to meet all goals.- not met  "

## 2024-03-14 ENCOUNTER — TREATMENT (OUTPATIENT)
Dept: PHYSICAL THERAPY | Facility: CLINIC | Age: 79
End: 2024-03-14
Payer: MEDICARE

## 2024-03-14 DIAGNOSIS — M70.61 GREATER TROCHANTERIC BURSITIS OF RIGHT HIP: ICD-10-CM

## 2024-03-14 DIAGNOSIS — M54.6 DORSALGIA OF THORACIC REGION: ICD-10-CM

## 2024-03-14 DIAGNOSIS — R53.1 GENERAL WEAKNESS: ICD-10-CM

## 2024-03-14 PROCEDURE — 97110 THERAPEUTIC EXERCISES: CPT | Mod: GP,CQ,KX

## 2024-03-14 ASSESSMENT — PAIN - FUNCTIONAL ASSESSMENT: PAIN_FUNCTIONAL_ASSESSMENT: 0-10

## 2024-03-14 ASSESSMENT — PAIN SCALES - GENERAL: PAINLEVEL_OUTOF10: 8

## 2024-03-14 NOTE — PROGRESS NOTES
"Physical Therapy    Physical Therapy Treatment    Patient Name: Jie Crocker  MRN: [id number]  Today's Date: 3/14/2024  Time Calculation  Start Time: 1015  Stop Time: 1055  Time Calculation (min): 40 min      Assessment:   Completed exercise program without c/o increased hip or knee pain. Moderate fatigue.      Plan:   PT 2 times/ week x 4 more weeks for continued focus on hip / quad strengthening; for improved gait mechanics; posture and body mechanics education for symptom management with daily activities.     Current Problem  1. Greater trochanteric bursitis of right hip  Follow Up In Physical Therapy      2. Dorsalgia of thoracic region  Follow Up In Physical Therapy      3. General weakness  Follow Up In Physical Therapy          General    Subjective    Pt. reports that she \"felt good\" after her last PT session. She did not sleep well and is tired. \"Not walking good\" this morning. Right hip and knee are painful.    Precautions   Precautions  STEADI Fall Risk Score (The score of 4 or more indicates an increased risk of falling): 3  Precautions Comment: Fibromyalgia    Pain  Pain Assessment  Pain Assessment: 0-10  Pain Score: 8  Pain Location: Hip  Pain Orientation: Right    Objective   R Quads:  4+/5; poor eccentric control; some medial knee pain with eccentric quad work.   L Quads: 4+/5  R Hamstrings: 4+/5  L Hamstrings: 4+/5    Gait  Gait Comment: Ambulates with limp on right side; less flexed at trunk ; still with   trendelenburg pattern on right during stance phase.     Increased reps with strengthening exercises - see flow sheet    Treatments:  EXERCISES Date 2/15/24 Date 2/20/24 Date 2/23/24 Date 3/12/24 3/14/23    Visit 6 Visit  Visit 8 Visit 9 Visit 10   Recheck for progress    15 min     Nustep   L4 x 13 min L4 13 min  (Pt declined today) L4 10 min L4 10 min   3 way hip  0# 2x10 ea 20# 2x10 ea  20#  2 x 10 ea way;   Ea leg 20# 2x10ea  Q-hip flexion, abduction, extension 20# 2x10 ea  Q-hip flexion, " "abduction, extension   Single knee to chest stretch         Seated hamstring stretch        Forward machine    6\" 1x15 ea 6\" 2x10 ea   Hamcurl machine     35# 30x 35# 3x15   Knee extension machine     15# 30x 15# 30x           Seated resisted hip abduction  Blue 20x               Seated repeated trunk ext   10 reps  10x  20x 20x   Back extension machine 50# 5x P! R hip ---- ------     Shuttle DLP   4B 30x   4B  2x 30 5B 2x30 5B 2x30   Shuttle SLP   2x10 ea   2B  2 x 10 3B 2x15 ea 3B 2x15 ea   Scapular retraction        \"No monies\" 2x10 3\"H 20x 5\"H      \" Hold up\" 2x10 3\"H 20x 5\"H      HEP          Patient Goal: Wants to have less pain and be able to get down on knees to garden. - not met    PT Goals: (as of 2/23/24)  Will be independent with symptom management strategies and report mid back pain no worse than 4/10 with standing/ sitting up to 20 min at a time for ADLs, IADLs, quilting. Low back and right hip pain no worse than 4/10 with turning over in bed; when first waking; and with walking up to 15 min.- not met  Right glut med/ max strength improved to at least 4/5; core/ postural mm at least 4+/5 for improved standing tolerance for cooking/food prep; improved painfree rolling in bed; and improved ability to kneel/ partial squat for gardening; household IADLs. - not met  Will improve outcome measure score on LEFS to at least 45/80 for improved function with selfcare, ADL's, IADLs- not met  Will demonstrate and consistently use proper posture and body mechanics for symptom management with all daily activities- partially met  Will be independent and compliant with appropriate HEP for carryover of PT to meet all goals.- not met  "

## 2024-03-19 ENCOUNTER — APPOINTMENT (OUTPATIENT)
Dept: PHYSICAL THERAPY | Facility: CLINIC | Age: 79
End: 2024-03-19
Payer: MEDICARE

## 2024-03-21 ENCOUNTER — APPOINTMENT (OUTPATIENT)
Dept: PHYSICAL THERAPY | Facility: CLINIC | Age: 79
End: 2024-03-21
Payer: MEDICARE

## 2024-03-21 ENCOUNTER — DOCUMENTATION (OUTPATIENT)
Dept: PHYSICAL THERAPY | Facility: CLINIC | Age: 79
End: 2024-03-21
Payer: MEDICARE

## 2024-03-21 NOTE — PROGRESS NOTES
Physical Therapy                 Therapy Communication Note    Patient Name: Jie Crocker  MRN: [id number]  Today's Date: 3/21/2024     Discipline: Physical Therapy    Missed Visit Reason:      Missed Time: Cancel    Comment:  Pt. Did not sleep well secondary to leg cramps

## 2024-03-26 ENCOUNTER — TREATMENT (OUTPATIENT)
Dept: PHYSICAL THERAPY | Facility: CLINIC | Age: 79
End: 2024-03-26
Payer: MEDICARE

## 2024-03-26 DIAGNOSIS — M70.61 GREATER TROCHANTERIC BURSITIS OF RIGHT HIP: ICD-10-CM

## 2024-03-26 DIAGNOSIS — M54.6 DORSALGIA OF THORACIC REGION: ICD-10-CM

## 2024-03-26 DIAGNOSIS — R53.1 GENERAL WEAKNESS: ICD-10-CM

## 2024-03-26 PROCEDURE — 97110 THERAPEUTIC EXERCISES: CPT | Mod: GP,CQ

## 2024-03-26 ASSESSMENT — PAIN SCALES - GENERAL: PAINLEVEL_OUTOF10: 5 - MODERATE PAIN

## 2024-03-26 ASSESSMENT — PAIN - FUNCTIONAL ASSESSMENT: PAIN_FUNCTIONAL_ASSESSMENT: 0-10

## 2024-03-26 NOTE — PROGRESS NOTES
Physical Therapy    Physical Therapy Treatment    Patient Name: Jie Crocker  MRN: [id number]  Today's Date: 3/26/2024  Time Calculation  Start Time: 1015  Stop Time: 1100  Time Calculation (min): 45 min      Assessment:   Decreased trendelenburg gait when the pt. walks at a slower pace. Pt. was challenged with hip hikes and lateral step ups. Verbal cueing was needed to perform these exercises with the correct technique form.    Plan:   PT 2 times/ week x 4 more weeks for continued focus on hip / quad strengthening; for improved gait mechanics; posture and body mechanics education for symptom management with daily activities.     Current Problem  1. Greater trochanteric bursitis of right hip  Follow Up In Physical Therapy      2. Dorsalgia of thoracic region  Follow Up In Physical Therapy      3. General weakness  Follow Up In Physical Therapy          General    Subjective    Pt. Reports that she continues to get leg cramps and is not sleeping well.  Pt. has an appointment with Dr. Yoder today because she has left foot pain. Foot pain makes walking difficult.    Precautions   Precautions  STEADI Fall Risk Score (The score of 4 or more indicates an increased risk of falling): 3  Precautions Comment: Fibromyalgia    Pain  Pain Assessment  Pain Assessment: 0-10  Pain Score: 5 - Moderate pain  Pain Location: Hip  Pain Orientation: Right    Objective   R Quads:  4+/5; poor eccentric control; some medial knee pain with eccentric quad work.   L Quads: 4+/5  R Hamstrings: 4+/5  L Hamstrings: 4+/5    Added hip hikes and lateral step ups    Gait  Gait Comment: Ambulates with limp on right side; less flexed at trunk ; still with   trendelenburg pattern on right during stance phase.     Treatments:  EXERCISES Date 2/15/24 Date 2/20/24 Date 2/23/24 Date 3/12/24 3/14/23 3/26/24    Visit 6 Visit  Visit 8 Visit 9 Visit 10 Visit 11   Recheck for progress    15 min      Nustep   L4 x 13 min L4 13 min  (Pt declined today) L4 10 min  "L4 10 min L4 12 min   3 way hip  0# 2x10 ea 20# 2x10 ea  20#  2 x 10 ea way;   Ea leg 20# 2x10ea  Q-hip flexion, abduction, extension 20# 2x10 ea  Q-hip flexion, abduction, extension 20# 2x-hip flexion, Q abduction, [id number] ea   Single knee to chest stretch          Seated hamstring stretch         Forward machine    6\" 1x15 ea 6\" 2x10 ea 6\" 10x ea lateral step up   Hamcurl machine     35# 30x 35# 3x15 35# 3x15   Knee extension machine     15# 30x 15# 30x 15# 30x            Seated resisted hip abduction  Blue 20x        Hip hikes         Seated repeated trunk ext   10 reps  10x  20x 20x    Back extension machine 50# 5x P! R hip ---- ------      Shuttle DLP   4B 30x   4B  2x 30 5B 2x30 5B 2x30 5B 4x15   Shuttle SLP   2x10 ea   2B  2 x 10 3B 2x15 ea 3B 2x15 ea 3B 4x15 R only   Scapular retraction         \"No monies\" 2x10 3\"H 20x 5\"H       \" Hold up\" 2x10 3\"H 20x 5\"H       HEP           Patient Goal: Wants to have less pain and be able to get down on knees to garden. - not met    PT Goals: (as of 2/23/24)  Will be independent with symptom management strategies and report mid back pain no worse than 4/10 with standing/ sitting up to 20 min at a time for ADLs, IADLs, quilting. Low back and right hip pain no worse than 4/10 with turning over in bed; when first waking; and with walking up to 15 min.- not met  Right glut med/ max strength improved to at least 4/5; core/ postural mm at least 4+/5 for improved standing tolerance for cooking/food prep; improved painfree rolling in bed; and improved ability to kneel/ partial squat for gardening; household IADLs. - not met  Will improve outcome measure score on LEFS to at least 45/80 for improved function with selfcare, ADL's, IADLs- not met  Will demonstrate and consistently use proper posture and body mechanics for symptom management with all daily activities- partially met  Will be independent and compliant with appropriate HEP for carryover of PT to meet all goals.- not " met

## 2024-03-28 ENCOUNTER — HOSPITAL ENCOUNTER (OUTPATIENT)
Dept: RADIOLOGY | Facility: CLINIC | Age: 79
Discharge: HOME | End: 2024-03-28
Payer: MEDICARE

## 2024-03-28 ENCOUNTER — APPOINTMENT (OUTPATIENT)
Dept: PHYSICAL THERAPY | Facility: CLINIC | Age: 79
End: 2024-03-28
Payer: MEDICARE

## 2024-03-28 ENCOUNTER — OFFICE VISIT (OUTPATIENT)
Dept: ORTHOPEDIC SURGERY | Facility: CLINIC | Age: 79
End: 2024-03-28
Payer: MEDICARE

## 2024-03-28 VITALS — WEIGHT: 192 LBS | BODY MASS INDEX: 29.1 KG/M2 | HEIGHT: 68 IN

## 2024-03-28 DIAGNOSIS — M20.41 HAMMER TOE OF RIGHT FOOT: ICD-10-CM

## 2024-03-28 DIAGNOSIS — M79.671 FOOT PAIN, BILATERAL: ICD-10-CM

## 2024-03-28 DIAGNOSIS — M79.672 FOOT PAIN, BILATERAL: ICD-10-CM

## 2024-03-28 DIAGNOSIS — M20.42 HAMMER TOE OF LEFT FOOT: ICD-10-CM

## 2024-03-28 DIAGNOSIS — M19.079 ARTHRITIS OF FOOT: ICD-10-CM

## 2024-03-28 DIAGNOSIS — M20.11 HALLUX VALGUS OF RIGHT FOOT: Primary | ICD-10-CM

## 2024-03-28 DIAGNOSIS — M20.12 HALLUX VALGUS OF LEFT FOOT: ICD-10-CM

## 2024-03-28 PROCEDURE — 73630 X-RAY EXAM OF FOOT: CPT | Mod: BILATERAL PROCEDURE | Performed by: STUDENT IN AN ORGANIZED HEALTH CARE EDUCATION/TRAINING PROGRAM

## 2024-03-28 PROCEDURE — 73630 X-RAY EXAM OF FOOT: CPT | Mod: 50

## 2024-03-28 PROCEDURE — 1159F MED LIST DOCD IN RCRD: CPT | Performed by: SPECIALIST

## 2024-03-28 PROCEDURE — 1036F TOBACCO NON-USER: CPT | Performed by: SPECIALIST

## 2024-03-28 PROCEDURE — 99214 OFFICE O/P EST MOD 30 MIN: CPT | Performed by: SPECIALIST

## 2024-03-28 NOTE — PROGRESS NOTES
Bilateral foot and ankle pain for years, progressively getting worse.   States her ankles hurt a lot at night, wakes her up.  Her toes cramp and are very painful at night time. Takes potassium supplements daily.  Can not find comfortable shoes.  Takes Advil PRN.    Left 2nd toe is longer than her big toe so she is constantly bumping her toe on things. Can not touch the 2nd toe without pain.    Xrays done.    No prior surgeries on her feet.    Exam: In stance she has bilateral pes planus bilateral left greater than right severe hallux valgus/bunion deformities.  Her left second toe is impinged by the great toe with a mild corn that sensitive.  She has tight gastrocs bilateral.  Supple painless ankle and subtalar motion, and painful intertarsal joints to palpation and rotational stress.  She has evidence of venous stasis mild dermatitis.  Normal pedal pulses, normal motor and sensation.    Radiographs: Bilateral feet show significant midfoot osteoarthritis, metatarsals primus varus/hallux valgus bunions, and valgus of the lesser toes.  She has pes planus.    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  Most of her leg complaints can be related more to venous stasis changes and possible electrolyte issues causing cramping.  Specific pain to the foot and ankle region is mostly related midfoot arthritis and forefoot deformities.  She does not wish to pursue any surgical options.  We are therefore recommending first web spacers, and extra-depth shoes with custom foot orthotics.  Follow-up if no improvement.

## 2024-03-29 ENCOUNTER — TREATMENT (OUTPATIENT)
Dept: PHYSICAL THERAPY | Facility: CLINIC | Age: 79
End: 2024-03-29
Payer: MEDICARE

## 2024-03-29 DIAGNOSIS — M54.6 DORSALGIA OF THORACIC REGION: ICD-10-CM

## 2024-03-29 DIAGNOSIS — R53.1 GENERAL WEAKNESS: ICD-10-CM

## 2024-03-29 DIAGNOSIS — M70.61 GREATER TROCHANTERIC BURSITIS OF RIGHT HIP: ICD-10-CM

## 2024-03-29 PROCEDURE — 97110 THERAPEUTIC EXERCISES: CPT | Mod: GP,KX

## 2024-03-29 ASSESSMENT — PAIN - FUNCTIONAL ASSESSMENT: PAIN_FUNCTIONAL_ASSESSMENT: 0-10

## 2024-03-29 ASSESSMENT — PAIN SCALES - GENERAL
PAINLEVEL_OUTOF10: 0 - NO PAIN
PAINLEVEL_OUTOF10: 0 - NO PAIN

## 2024-03-29 NOTE — PROGRESS NOTES
Physical Therapy    Physical Therapy Treatment/ Recheck/ Discharge    Patient Name: Jie Crocker  MRN: [id number]  Today's Date: 3/29/2024  Time Calculation  Start Time: 1020  Stop Time: 1105  Time Calculation (min): 45 min      Assessment:    Jie has attended 12 PT visits to work to address her back pain, right hip pain and weakness which is limiting her walking/ standing tolerance. She reports minimal improvement overall, with still fluctuating levels of pain which particularly limit her standing and walking. She is consistent with only certain exercises that she prefers. She still sees the chiropractor. We discussed posture/ body mechanics modifications to decrease her pain with activity and we reviewed how to best get up from kneeling if she is on the floor or ground.  Her LE strength is improved; and she reports improved stair climbing; but not much change in her day to day pain or standing/ walking tolerance (10 min at most before needing to rest).  As progress in PT has plateaued, we will discharge formal PT at this time. Jie will work to incorporate better posture and body mechanics; continue with her HEP as able. See objective data and goal status.    Plan:    Discharge from PT; partial progress towards goals. Progress plateaued and additional progress is questionable.     Current Problem  1. Greater trochanteric bursitis of right hip  Follow Up In Physical Therapy      2. Dorsalgia of thoracic region  Follow Up In Physical Therapy      3. General weakness  Follow Up In Physical Therapy          General     General  General Comment: Does not feel a lot different pain wise. Still getting leg cramps in legs at night. Still having pain, but does feel like she is doing stairs better (up/down reciprocally). Saw Dr Yoder yesterdy about her ankles/feet; and he gave her a prescription for orthotics. Still having severe right hip pain in the morning until she gets up and moves around. Wants to be able to know  "she can get up from the ground/ floor if she gets down to garden. still has to pace herself with standing activities or walking. Chiropractic doesn't really help overall either. Stays very busy with clubs, sewing. Does a limited amount of ex at home. Has not been doing much from therapy. REports multiple approaches to her problems but feels like nothing has really helped.    Subjective    Precautions  Precautions  STEADI Fall Risk Score (The score of 4 or more indicates an increased risk of falling): 3  Precautions Comment: Fibromyalgia    Pain  Pain Assessment  Pain Assessment: 0-10  Pain Score: 0 - No pain (0 at rest;  worse in the monring until getting up and moving around- can still be 8/10)  Pain Location: Hip  Pain Orientation: Right  Pain 2  Pain Score 2: 0 - No pain (worse with standing/ walking)  Pain Type 2: Chronic pain  Pain Location 2: Back  Pain Orientation 2: Mid    Objective   Standing / walking up to 10 minutes before   Describes bending over at the waist to garden     Trunk AROM:  Flexion: wnl- pulls a little across her low back  Ext: min limited, mild buttock pressure  R lat flexion: wfl  L lat flexion: wfl    Hip strength:  4+/5 throughout bilaterally    Quads: 4+/5 bilaterally    Still demonstrates poor body mechanics for pulling weeds/ planting; we discussed using more optimal body mechanics for her daily activities.     Reviewed/ discussed posture/ body mechanics modifications for daily activities and handouts issued.    Treatments:  EXERCISES 3/14/23 3/26/24  3/29/24    Visit 10 Visit 11  Visit 12   Recheck for progress    15   Nustep L4 10 min L4 12 min  L3 x 15 min   3 way hip  20# 2x10 ea  Q-hip flexion, abduction, extension 20# 2x-hip flexion, Q abduction, [id number] ea    Single knee to chest stretch      Seated hamstring stretch      Forward machine 6\" 2x10 ea 6\" 10x ea lateral step up    Hamcurl machine  35# 3x15 35# 3x15    Knee extension machine 15# 30x 15# 30x          Seated " "resisted hip abduction      Hip hikes      Seated repeated trunk ext 20x     Back extension machine      Shuttle DLP 5B 2x30 5B 4x15    Shuttle SLP 3B 2x15 ea 3B 4x15 R only    Scapular retraction      \"No monies\"      \" Hold up\"      Body mechanics    15 min:Educated on and practiced getting from from half kneeling position.   Reviewed posture/ body mechanics modifications for gardening, daily activities and handouts issued.   HEP   Reviewed      Patient Goal: Wants to have less pain and be able to get down on knees to garden. -  met    PT Goals: (as of 3/29/24)  Will be independent with symptom management strategies and report mid back pain no worse than 4/10 with standing/ sitting up to 20 min at a time for ADLs, IADLs, quilting. Low back and right hip pain no worse than 4/10 with turning over in bed; when first waking; and with walking up to 15 min.- not met; intermittent decrease in pain; depends on activity; standing limited to about 10 min.   Right glut med/ max strength improved to at least 4/5; core/ postural mm at least 4+/5 for improved standing tolerance for cooking/food prep; improved painfree rolling in bed; and improved ability to kneel/ partial squat for gardening; household IADLs. - partially met  Will improve outcome measure score on LEFS to at least 45/80 for improved function with selfcare, ADL's, IADLs- not met  Will demonstrate and consistently use proper posture and body mechanics for symptom management with all daily activities- partially met; reviewed again.  Will be independent and compliant with appropriate HEP for carryover of PT to meet all goals.- partially met  "

## 2024-04-19 ENCOUNTER — LAB (OUTPATIENT)
Dept: LAB | Facility: LAB | Age: 79
End: 2024-04-19
Payer: MEDICARE

## 2024-04-19 DIAGNOSIS — I73.9 PAD (PERIPHERAL ARTERY DISEASE) (CMS-HCC): ICD-10-CM

## 2024-04-19 DIAGNOSIS — Z00.00 MEDICARE ANNUAL WELLNESS VISIT, SUBSEQUENT: ICD-10-CM

## 2024-04-19 DIAGNOSIS — E06.3 HYPOTHYROIDISM DUE TO HASHIMOTO'S THYROIDITIS: ICD-10-CM

## 2024-04-19 DIAGNOSIS — E03.8 HYPOTHYROIDISM DUE TO HASHIMOTO'S THYROIDITIS: ICD-10-CM

## 2024-04-19 DIAGNOSIS — E78.5 DYSLIPIDEMIA, GOAL LDL BELOW 70: ICD-10-CM

## 2024-04-19 DIAGNOSIS — J44.89 COPD (CHRONIC OBSTRUCTIVE PULMONARY DISEASE) WITH CHRONIC BRONCHITIS (MULTI): ICD-10-CM

## 2024-04-19 LAB
ALBUMIN SERPL BCP-MCNC: 4.1 G/DL (ref 3.4–5)
ALP SERPL-CCNC: 83 U/L (ref 33–136)
ALT SERPL W P-5'-P-CCNC: 21 U/L (ref 7–45)
ANION GAP SERPL CALC-SCNC: 11 MMOL/L (ref 10–20)
AST SERPL W P-5'-P-CCNC: 19 U/L (ref 9–39)
BASOPHILS # BLD AUTO: 0.07 X10*3/UL (ref 0–0.1)
BASOPHILS NFR BLD AUTO: 1 %
BILIRUB SERPL-MCNC: 0.5 MG/DL (ref 0–1.2)
BUN SERPL-MCNC: 15 MG/DL (ref 6–23)
CALCIUM SERPL-MCNC: 9.4 MG/DL (ref 8.6–10.3)
CHLORIDE SERPL-SCNC: 105 MMOL/L (ref 98–107)
CHOLEST SERPL-MCNC: 201 MG/DL (ref 0–199)
CHOLESTEROL/HDL RATIO: 4.8
CO2 SERPL-SCNC: 27 MMOL/L (ref 21–32)
CREAT SERPL-MCNC: 0.76 MG/DL (ref 0.5–1.05)
EGFRCR SERPLBLD CKD-EPI 2021: 81 ML/MIN/1.73M*2
EOSINOPHIL # BLD AUTO: 0.22 X10*3/UL (ref 0–0.4)
EOSINOPHIL NFR BLD AUTO: 3.1 %
ERYTHROCYTE [DISTWIDTH] IN BLOOD BY AUTOMATED COUNT: 13.9 % (ref 11.5–14.5)
GLUCOSE SERPL-MCNC: 87 MG/DL (ref 74–99)
HCT VFR BLD AUTO: 44.6 % (ref 36–46)
HDLC SERPL-MCNC: 42 MG/DL
HGB BLD-MCNC: 14.1 G/DL (ref 12–16)
IMM GRANULOCYTES # BLD AUTO: 0.02 X10*3/UL (ref 0–0.5)
IMM GRANULOCYTES NFR BLD AUTO: 0.3 % (ref 0–0.9)
LDLC SERPL CALC-MCNC: 120 MG/DL
LYMPHOCYTES # BLD AUTO: 2.62 X10*3/UL (ref 0.8–3)
LYMPHOCYTES NFR BLD AUTO: 36.3 %
MCH RBC QN AUTO: 28.7 PG (ref 26–34)
MCHC RBC AUTO-ENTMCNC: 31.6 G/DL (ref 32–36)
MCV RBC AUTO: 91 FL (ref 80–100)
MONOCYTES # BLD AUTO: 0.65 X10*3/UL (ref 0.05–0.8)
MONOCYTES NFR BLD AUTO: 9 %
NEUTROPHILS # BLD AUTO: 3.63 X10*3/UL (ref 1.6–5.5)
NEUTROPHILS NFR BLD AUTO: 50.3 %
NON HDL CHOLESTEROL: 159 MG/DL (ref 0–149)
NRBC BLD-RTO: 0 /100 WBCS (ref 0–0)
PLATELET # BLD AUTO: 256 X10*3/UL (ref 150–450)
POTASSIUM SERPL-SCNC: 4.2 MMOL/L (ref 3.5–5.3)
PROT SERPL-MCNC: 6.6 G/DL (ref 6.4–8.2)
RBC # BLD AUTO: 4.91 X10*6/UL (ref 4–5.2)
SODIUM SERPL-SCNC: 139 MMOL/L (ref 136–145)
T4 FREE SERPL-MCNC: 1.07 NG/DL (ref 0.61–1.12)
TRIGL SERPL-MCNC: 197 MG/DL (ref 0–149)
TSH SERPL-ACNC: 0.17 MIU/L (ref 0.44–3.98)
VLDL: 39 MG/DL (ref 0–40)
WBC # BLD AUTO: 7.2 X10*3/UL (ref 4.4–11.3)

## 2024-04-19 PROCEDURE — 80053 COMPREHEN METABOLIC PANEL: CPT

## 2024-04-19 PROCEDURE — 84439 ASSAY OF FREE THYROXINE: CPT

## 2024-04-19 PROCEDURE — 80061 LIPID PANEL: CPT

## 2024-04-19 PROCEDURE — 84443 ASSAY THYROID STIM HORMONE: CPT

## 2024-04-19 PROCEDURE — 85025 COMPLETE CBC W/AUTO DIFF WBC: CPT

## 2024-04-19 PROCEDURE — 36415 COLL VENOUS BLD VENIPUNCTURE: CPT

## 2024-04-23 ENCOUNTER — APPOINTMENT (OUTPATIENT)
Dept: UROLOGY | Facility: CLINIC | Age: 79
End: 2024-04-23
Payer: MEDICARE

## 2024-04-30 ENCOUNTER — OFFICE VISIT (OUTPATIENT)
Dept: PRIMARY CARE | Facility: CLINIC | Age: 79
End: 2024-04-30
Payer: MEDICARE

## 2024-04-30 VITALS
HEIGHT: 68 IN | BODY MASS INDEX: 26.83 KG/M2 | HEART RATE: 68 BPM | SYSTOLIC BLOOD PRESSURE: 110 MMHG | WEIGHT: 177 LBS | DIASTOLIC BLOOD PRESSURE: 70 MMHG

## 2024-04-30 DIAGNOSIS — E06.3 HYPOTHYROIDISM DUE TO HASHIMOTO'S THYROIDITIS: Primary | ICD-10-CM

## 2024-04-30 DIAGNOSIS — D89.89 CFIDS (CHRONIC FATIGUE AND IMMUNE DYSFUNCTION SYNDROME) (MULTI): ICD-10-CM

## 2024-04-30 DIAGNOSIS — I87.2 CHRONIC VENOUS INSUFFICIENCY OF LOWER EXTREMITY: ICD-10-CM

## 2024-04-30 DIAGNOSIS — E53.8 VITAMIN B12 DEFICIENCY: ICD-10-CM

## 2024-04-30 DIAGNOSIS — E03.8 HYPOTHYROIDISM DUE TO HASHIMOTO'S THYROIDITIS: Primary | ICD-10-CM

## 2024-04-30 DIAGNOSIS — J44.89 COPD (CHRONIC OBSTRUCTIVE PULMONARY DISEASE) WITH CHRONIC BRONCHITIS (MULTI): ICD-10-CM

## 2024-04-30 DIAGNOSIS — I73.9 PAD (PERIPHERAL ARTERY DISEASE) (CMS-HCC): ICD-10-CM

## 2024-04-30 DIAGNOSIS — G93.32 CFIDS (CHRONIC FATIGUE AND IMMUNE DYSFUNCTION SYNDROME) (MULTI): ICD-10-CM

## 2024-04-30 PROBLEM — R11.2 POSTOPERATIVE NAUSEA AND VOMITING: Status: RESOLVED | Noted: 2023-12-13 | Resolved: 2024-04-30

## 2024-04-30 PROBLEM — M70.61 GREATER TROCHANTERIC BURSITIS OF RIGHT HIP: Status: RESOLVED | Noted: 2023-06-28 | Resolved: 2024-04-30

## 2024-04-30 PROBLEM — Z98.890 POSTOPERATIVE NAUSEA AND VOMITING: Status: RESOLVED | Noted: 2023-12-13 | Resolved: 2024-04-30

## 2024-04-30 PROCEDURE — 99213 OFFICE O/P EST LOW 20 MIN: CPT | Performed by: INTERNAL MEDICINE

## 2024-04-30 PROCEDURE — 1159F MED LIST DOCD IN RCRD: CPT | Performed by: INTERNAL MEDICINE

## 2024-04-30 PROCEDURE — 96372 THER/PROPH/DIAG INJ SC/IM: CPT | Performed by: INTERNAL MEDICINE

## 2024-04-30 PROCEDURE — 1160F RVW MEDS BY RX/DR IN RCRD: CPT | Performed by: INTERNAL MEDICINE

## 2024-04-30 PROCEDURE — 1036F TOBACCO NON-USER: CPT | Performed by: INTERNAL MEDICINE

## 2024-04-30 RX ORDER — CYANOCOBALAMIN 1000 UG/ML
1000 INJECTION, SOLUTION INTRAMUSCULAR; SUBCUTANEOUS ONCE
Status: COMPLETED | OUTPATIENT
Start: 2024-04-30 | End: 2024-04-30

## 2024-04-30 RX ADMIN — CYANOCOBALAMIN 1000 MCG: 1000 INJECTION, SOLUTION INTRAMUSCULAR; SUBCUTANEOUS at 09:56

## 2024-04-30 ASSESSMENT — PATIENT HEALTH QUESTIONNAIRE - PHQ9
2. FEELING DOWN, DEPRESSED OR HOPELESS: NOT AT ALL
1. LITTLE INTEREST OR PLEASURE IN DOING THINGS: NOT AT ALL
SUM OF ALL RESPONSES TO PHQ9 QUESTIONS 1 AND 2: 0

## 2024-04-30 ASSESSMENT — ANXIETY QUESTIONNAIRES
5. BEING SO RESTLESS THAT IT IS HARD TO SIT STILL: NOT AT ALL
7. FEELING AFRAID AS IF SOMETHING AWFUL MIGHT HAPPEN: NOT AT ALL
1. FEELING NERVOUS, ANXIOUS, OR ON EDGE: NOT AT ALL
6. BECOMING EASILY ANNOYED OR IRRITABLE: NOT AT ALL
3. WORRYING TOO MUCH ABOUT DIFFERENT THINGS: NOT AT ALL
2. NOT BEING ABLE TO STOP OR CONTROL WORRYING: NOT AT ALL
GAD7 TOTAL SCORE: 0
IF YOU CHECKED OFF ANY PROBLEMS ON THIS QUESTIONNAIRE, HOW DIFFICULT HAVE THESE PROBLEMS MADE IT FOR YOU TO DO YOUR WORK, TAKE CARE OF THINGS AT HOME, OR GET ALONG WITH OTHER PEOPLE: NOT DIFFICULT AT ALL
4. TROUBLE RELAXING: NOT AT ALL

## 2024-04-30 ASSESSMENT — ENCOUNTER SYMPTOMS
DEPRESSION: 0
OCCASIONAL FEELINGS OF UNSTEADINESS: 0
LOSS OF SENSATION IN FEET: 0

## 2024-04-30 NOTE — PROGRESS NOTES
"Subjective   Patient ID: Jie Crocker is a 79 y.o. female who presents for Follow-up.    HPI     Review of Systems    Objective   /70 (BP Location: Left arm, Patient Position: Sitting)   Pulse 68   Ht 1.715 m (5' 7.5\")   Wt 86.6 kg (191 lb)   BMI 29.47 kg/m²     Physical Exam    Assessment/Plan          "

## 2024-04-30 NOTE — PROGRESS NOTES
"Subjective   Reason for Visit: Jie Crocker is an 79 y.o. female here for a fu  visit.     Past Medical, Surgical, and Family History reviewed and updated in chart.         HPI    Patient Care Team:  Magdaleno Nielson DO as PCP - General  Magdaleno Nielson DO as PCP - [facility] Attributed Provider     Review of Systems   Constitutional:  Positive for fatigue.   Musculoskeletal:  Positive for arthralgias, back pain, gait problem and myalgias.   All other systems reviewed and are negative.      Objective   Vitals:  /70 (BP Location: Left arm, Patient Position: Sitting)   Pulse 68   Ht 1.715 m (5' 7.5\")   Wt 80.3 kg (177 lb)   BMI 27.31 kg/m²       Physical Exam  Vitals and nursing note reviewed.   Constitutional:       General: She is not in acute distress.     Appearance: Normal appearance. She is well-developed. She is not toxic-appearing.   HENT:      Head: Normocephalic and atraumatic.      Right Ear: Tympanic membrane and external ear normal.      Left Ear: Tympanic membrane and external ear normal.      Nose: Nose normal.      Mouth/Throat:      Mouth: Mucous membranes are moist.      Pharynx: Oropharynx is clear. No oropharyngeal exudate or posterior oropharyngeal erythema.      Tonsils: No tonsillar exudate. 2+ on the right. 2+ on the left.   Eyes:      Extraocular Movements: Extraocular movements intact.      Conjunctiva/sclera: Conjunctivae normal.   Cardiovascular:      Rate and Rhythm: Normal rate and regular rhythm.      Pulses: Normal pulses.      Heart sounds: Normal heart sounds. No murmur heard.  Pulmonary:      Effort: Pulmonary effort is normal.      Breath sounds: Normal breath sounds.   Abdominal:      General: Abdomen is flat. Bowel sounds are normal.      Palpations: Abdomen is soft.   Musculoskeletal:      Cervical back: Neck supple.   Lymphadenopathy:      Cervical: No cervical adenopathy.   Skin:     General: Skin is warm and dry.      Findings: No rash.   Neurological:      " Mental Status: She is alert. Mental status is at baseline.   Psychiatric:         Mood and Affect: Mood normal.         Behavior: Behavior normal.         Thought Content: Thought content normal.         Judgment: Judgment normal.         Assessment/Plan   Problem List Items Addressed This Visit       Chronic venous insufficiency of lower extremity    Current Assessment & Plan     Venous Dopplers revealed significant venous reflux patient does not wish to any interventional therapy through vascular surgery at this time to include stenting to improve overall lower extremity edema and venous insufficiency no evidence of venous stasis ulceration continue to monitor arterial studies did not reveal any evidence of peripheral vascular disease or peripheral arterial disease in both studies of the carotid and lower arteries no signs of ischemic claudication         COPD (chronic obstructive pulmonary disease) with chronic bronchitis (Multi)    Current Assessment & Plan     Respiratory status stable on budesonide/formoterol 2 puffs twice a dayAnd as needed use of albuterol         Relevant Orders    Follow Up In [facility] - PCP - Established    Vitamin B12    Vitamin D 25-Hydroxy,Total (for eval of Vitamin D levels)    Tsh With Reflex To Free T4 If Abnormal    Comprehensive metabolic panel    Lipid Panel    Hepatitis C antibody    CFIDS (chronic fatigue and immune dysfunction syndrome) (Multi)    Current Assessment & Plan     Patient intolerant of medication trials continue to encourage regular exercise and adequate sleep to improve overall quality of life         Relevant Orders    Follow Up In [facility] - PCP - Established    Vitamin B12    Vitamin D 25-Hydroxy,Total (for eval of Vitamin D levels)    Tsh With Reflex To Free T4 If Abnormal    Comprehensive metabolic panel    Lipid Panel    Hepatitis C antibody    Hypothyroidism due to Hashimoto's thyroiditis - Primary    Current Assessment & Plan      Clinically euthyroid with TSH of 0.17 and free T4 level of 1.07 continue levothyroxine at 112 mcg daily         Relevant Orders    Follow Up In [facility] - PCP - Established    Vitamin B12    Vitamin D 25-Hydroxy,Total (for eval of Vitamin D levels)    Tsh With Reflex To Free T4 If Abnormal    Comprehensive metabolic panel    Lipid Panel    Hepatitis C antibody    Vitamin B12 deficiency    Current Assessment & Plan      continue vitamin B12 sections stable at this time         Relevant Orders    Follow Up In [facility] - PCP - Established    Vitamin B12    Vitamin D 25-Hydroxy,Total (for eval of Vitamin D levels)    Tsh With Reflex To Free T4 If Abnormal    Comprehensive metabolic panel    Lipid Panel    Hepatitis C antibody    BMI 27.0-27.9,adult     Other Visit Diagnoses       PAD (peripheral artery disease) (CMS-HCC)        Relevant Orders    Follow Up In [facility] - PCP - Established    Vitamin B12    Vitamin D 25-Hydroxy,Total (for eval of Vitamin D levels)    Tsh With Reflex To Free T4 If Abnormal    Comprehensive metabolic panel    Lipid Panel    Hepatitis C antibody

## 2024-05-01 PROBLEM — I65.23 BILATERAL CAROTID ARTERY STENOSIS: Status: RESOLVED | Noted: 2023-06-28 | Resolved: 2024-05-01

## 2024-05-01 ASSESSMENT — ENCOUNTER SYMPTOMS
ARTHRALGIAS: 1
BACK PAIN: 1
FATIGUE: 1
MYALGIAS: 1

## 2024-05-02 NOTE — ASSESSMENT & PLAN NOTE
Respiratory status stable on budesonide/formoterol 2 puffs twice a dayAnd as needed use of albuterol

## 2024-05-02 NOTE — ASSESSMENT & PLAN NOTE
Patient intolerant of medication trials continue to encourage regular exercise and adequate sleep to improve overall quality of life

## 2024-05-02 NOTE — ASSESSMENT & PLAN NOTE
Clinically euthyroid with TSH of 0.17 and free T4 level of 1.07 continue levothyroxine at 112 mcg daily

## 2024-05-02 NOTE — ASSESSMENT & PLAN NOTE
Venous Dopplers revealed significant venous reflux patient does not wish to any interventional therapy through vascular surgery at this time to include stenting to improve overall lower extremity edema and venous insufficiency no evidence of venous stasis ulceration continue to monitor arterial studies did not reveal any evidence of peripheral vascular disease or peripheral arterial disease in both studies of the carotid and lower arteries no signs of ischemic claudication

## 2024-05-17 ENCOUNTER — TELEPHONE (OUTPATIENT)
Dept: PRIMARY CARE | Facility: CLINIC | Age: 79
End: 2024-05-17
Payer: MEDICARE

## 2024-05-17 NOTE — TELEPHONE ENCOUNTER
She has COPD & asthma & now she has possible bronchitis , coughing-productive (phlegm -no color) & wheezing x 4 days    She has used Nyquil & she had an old prednisone that she took , she was able to sleep , but it caused leg cramps.    Asking if she can get in to see Dr Nielson or to get a breathing treatment ?

## 2024-05-20 ENCOUNTER — OFFICE VISIT (OUTPATIENT)
Dept: PRIMARY CARE | Facility: CLINIC | Age: 79
End: 2024-05-20
Payer: MEDICARE

## 2024-05-20 VITALS
WEIGHT: 193 LBS | HEART RATE: 69 BPM | HEIGHT: 68 IN | OXYGEN SATURATION: 96 % | SYSTOLIC BLOOD PRESSURE: 120 MMHG | RESPIRATION RATE: 16 BRPM | DIASTOLIC BLOOD PRESSURE: 70 MMHG | BODY MASS INDEX: 29.25 KG/M2

## 2024-05-20 DIAGNOSIS — J30.2 PERENNIAL ALLERGIC RHINITIS WITH SEASONAL VARIATION: ICD-10-CM

## 2024-05-20 DIAGNOSIS — J30.89 PERENNIAL ALLERGIC RHINITIS WITH SEASONAL VARIATION: Primary | ICD-10-CM

## 2024-05-20 DIAGNOSIS — J30.2 PERENNIAL ALLERGIC RHINITIS WITH SEASONAL VARIATION: Primary | ICD-10-CM

## 2024-05-20 DIAGNOSIS — J30.89 PERENNIAL ALLERGIC RHINITIS WITH SEASONAL VARIATION: ICD-10-CM

## 2024-05-20 PROCEDURE — 1160F RVW MEDS BY RX/DR IN RCRD: CPT

## 2024-05-20 PROCEDURE — 99213 OFFICE O/P EST LOW 20 MIN: CPT

## 2024-05-20 PROCEDURE — 1036F TOBACCO NON-USER: CPT

## 2024-05-20 PROCEDURE — 1159F MED LIST DOCD IN RCRD: CPT

## 2024-05-20 RX ORDER — MONTELUKAST SODIUM 10 MG/1
10 TABLET ORAL NIGHTLY
Qty: 30 TABLET | Refills: 11 | Status: SHIPPED | OUTPATIENT
Start: 2024-05-20 | End: 2024-05-20

## 2024-05-20 RX ORDER — MONTELUKAST SODIUM 10 MG/1
TABLET ORAL
Qty: 90 TABLET | Refills: 3 | Status: SHIPPED | OUTPATIENT
Start: 2024-05-20 | End: 2025-05-20

## 2024-05-20 ASSESSMENT — ANXIETY QUESTIONNAIRES
GAD7 TOTAL SCORE: 0
2. NOT BEING ABLE TO STOP OR CONTROL WORRYING: NOT AT ALL
1. FEELING NERVOUS, ANXIOUS, OR ON EDGE: NOT AT ALL
7. FEELING AFRAID AS IF SOMETHING AWFUL MIGHT HAPPEN: NOT AT ALL
3. WORRYING TOO MUCH ABOUT DIFFERENT THINGS: NOT AT ALL
6. BECOMING EASILY ANNOYED OR IRRITABLE: NOT AT ALL
5. BEING SO RESTLESS THAT IT IS HARD TO SIT STILL: NOT AT ALL
4. TROUBLE RELAXING: NOT AT ALL
IF YOU CHECKED OFF ANY PROBLEMS ON THIS QUESTIONNAIRE, HOW DIFFICULT HAVE THESE PROBLEMS MADE IT FOR YOU TO DO YOUR WORK, TAKE CARE OF THINGS AT HOME, OR GET ALONG WITH OTHER PEOPLE: NOT DIFFICULT AT ALL

## 2024-05-20 ASSESSMENT — ENCOUNTER SYMPTOMS
COUGH: 1
CARDIOVASCULAR NEGATIVE: 1
EYES NEGATIVE: 1
GASTROINTESTINAL NEGATIVE: 1
OCCASIONAL FEELINGS OF UNSTEADINESS: 0
ALLERGIC/IMMUNOLOGIC NEGATIVE: 1
MUSCULOSKELETAL NEGATIVE: 1
PSYCHIATRIC NEGATIVE: 1
NEUROLOGICAL NEGATIVE: 1
HEMATOLOGIC/LYMPHATIC NEGATIVE: 1
CONSTITUTIONAL NEGATIVE: 1
DEPRESSION: 0
ENDOCRINE NEGATIVE: 1
LOSS OF SENSATION IN FEET: 0

## 2024-05-20 NOTE — PATIENT INSTRUCTIONS
Assessment/Plan   Problem List Items Addressed This Visit       Perennial allergic rhinitis with seasonal variation - Primary    Relevant Medications    montelukast (Singulair) 10 mg tablet     We will try Singulair daily to see if this helps with your cough.  Continue with the over the counter cough medicines like robitussin. Please let me know if this does not get better.  I know you get leg cramps with the steroids but I recommended using your inhaler if you need it.

## 2024-05-20 NOTE — PROGRESS NOTES
"Subjective   Patient ID: Jie Crocker is a 79 y.o. female who presents for Cough.    Cough       Patient has cough that started two weeks ago.  No fever, not coughing up mucus. No nasal, post nasal drip.  No ear pain.  Does not like to use inhalers due to side effect of legs cramps.  Did have one prednisone from prior prescription that she took one night because she couldn't stop coughing and states still is having leg cramps. States can not tolerate steroids. Has been using dayquil and nightquil. Has been having to use two pillows at night to get sleep. Has PRN albuterol but has not been using due to steroid effect with leg cramps and does not use Symbicort daily as ordered.     Review of Systems   Constitutional: Negative.    HENT: Negative.     Eyes: Negative.    Respiratory:  Positive for cough.    Cardiovascular: Negative.    Gastrointestinal: Negative.    Endocrine: Negative.    Genitourinary: Negative.    Musculoskeletal: Negative.    Skin: Negative.    Allergic/Immunologic: Negative.    Neurological: Negative.    Hematological: Negative.    Psychiatric/Behavioral: Negative.     All other systems reviewed and are negative.      Objective   /70   Pulse 69   Resp 16   Ht 1.715 m (5' 7.5\")   Wt 87.5 kg (193 lb)   SpO2 96%   BMI 29.78 kg/m²     Physical Exam  Constitutional:       Appearance: Normal appearance.   HENT:      Head: Normocephalic and atraumatic.      Nose: Nose normal.      Mouth/Throat:      Mouth: Mucous membranes are moist.      Pharynx: Oropharynx is clear.   Eyes:      Pupils: Pupils are equal, round, and reactive to light.   Cardiovascular:      Rate and Rhythm: Normal rate and regular rhythm.      Pulses: Normal pulses.      Heart sounds: Normal heart sounds.   Pulmonary:      Effort: Pulmonary effort is normal.      Breath sounds: Normal breath sounds.      Comments: Dry non-productive cough  Abdominal:      General: Bowel sounds are normal.      Palpations: Abdomen is soft. "   Musculoskeletal:         General: Normal range of motion.      Cervical back: Normal range of motion.   Skin:     General: Skin is warm and dry.   Neurological:      General: No focal deficit present.      Mental Status: She is alert and oriented to person, place, and time.   Psychiatric:         Mood and Affect: Mood normal.         Behavior: Behavior normal.         Thought Content: Thought content normal.         Judgment: Judgment normal.         Assessment/Plan   Problem List Items Addressed This Visit       Perennial allergic rhinitis with seasonal variation - Primary    Relevant Medications    montelukast (Singulair) 10 mg tablet     We will try Singulair daily to see if this helps with your cough.  Continue with the over the counter cough medicines like robitussin. Please let me know if this does not get better.  I know you get leg cramps with the steroids but I recommended using your inhaler if you need it.

## 2024-06-27 ENCOUNTER — TELEPHONE (OUTPATIENT)
Dept: PRIMARY CARE | Facility: CLINIC | Age: 79
End: 2024-06-27
Payer: MEDICARE

## 2024-07-25 ENCOUNTER — TELEPHONE (OUTPATIENT)
Dept: PRIMARY CARE | Facility: CLINIC | Age: 79
End: 2024-07-25
Payer: MEDICARE

## 2024-07-25 DIAGNOSIS — E03.8 HYPOTHYROIDISM DUE TO HASHIMOTO'S THYROIDITIS: ICD-10-CM

## 2024-07-25 DIAGNOSIS — E06.3 HYPOTHYROIDISM DUE TO HASHIMOTO'S THYROIDITIS: ICD-10-CM

## 2024-07-25 RX ORDER — LEVOTHYROXINE SODIUM 112 UG/1
112 TABLET ORAL DAILY
Qty: 90 TABLET | Refills: 3 | Status: SHIPPED | OUTPATIENT
Start: 2024-07-25 | End: 2025-07-25

## 2024-07-25 NOTE — TELEPHONE ENCOUNTER
Med Refill   levothyroxine (Synthroid, Levoxyl) 112 mcg tablet [[id number]]      [facility] #[id number] - [facility] [address] [facility] [address]  [address] [address], [facility] [address]  Phone: [phone]  Fax: [phone]  VENITA #: [id number]      90 day supply

## 2024-07-25 NOTE — TELEPHONE ENCOUNTER
Med Refill   levothyroxine (Synthroid, Levoxyl) 112 mcg tablet [[id number]]     [facility] #[id number] - [facility] [address] [facility] [address]  [address] [address], [facility] [address]  Phone: [phone]  Fax: [phone]  VENITA #: [id number]     90 day supply

## 2024-09-26 ENCOUNTER — TELEPHONE (OUTPATIENT)
Dept: PRIMARY CARE | Facility: CLINIC | Age: 79
End: 2024-09-26
Payer: MEDICARE

## 2024-09-26 NOTE — TELEPHONE ENCOUNTER
Patient called and wants to know she is constantly sweating. She says that when other are cold she is sweating even after the smallest amount of movement. Unsure what's going wondering if pcp had any insight please advise

## 2024-09-27 NOTE — TELEPHONE ENCOUNTER
Patient called back offered appointment with another provider, refused Will get labs and discuss at appointment

## 2024-10-21 ENCOUNTER — LAB (OUTPATIENT)
Dept: LAB | Facility: LAB | Age: 79
End: 2024-10-21
Payer: MEDICARE

## 2024-10-21 DIAGNOSIS — J44.89 COPD (CHRONIC OBSTRUCTIVE PULMONARY DISEASE) WITH CHRONIC BRONCHITIS (MULTI): ICD-10-CM

## 2024-10-21 DIAGNOSIS — E06.3 HYPOTHYROIDISM DUE TO HASHIMOTO'S THYROIDITIS: ICD-10-CM

## 2024-10-21 DIAGNOSIS — D89.89 CFIDS (CHRONIC FATIGUE AND IMMUNE DYSFUNCTION SYNDROME) (MULTI): ICD-10-CM

## 2024-10-21 DIAGNOSIS — E53.8 VITAMIN B12 DEFICIENCY: ICD-10-CM

## 2024-10-21 DIAGNOSIS — G93.32 CFIDS (CHRONIC FATIGUE AND IMMUNE DYSFUNCTION SYNDROME) (MULTI): ICD-10-CM

## 2024-10-21 DIAGNOSIS — I73.9 PAD (PERIPHERAL ARTERY DISEASE) (CMS-HCC): ICD-10-CM

## 2024-10-21 LAB
25(OH)D3 SERPL-MCNC: 57 NG/ML (ref 30–100)
ALBUMIN SERPL BCP-MCNC: 4.2 G/DL (ref 3.4–5)
ALP SERPL-CCNC: 93 U/L (ref 33–136)
ALT SERPL W P-5'-P-CCNC: 20 U/L (ref 7–45)
ANION GAP SERPL CALC-SCNC: 13 MMOL/L (ref 10–20)
AST SERPL W P-5'-P-CCNC: 21 U/L (ref 9–39)
BILIRUB SERPL-MCNC: 0.4 MG/DL (ref 0–1.2)
BUN SERPL-MCNC: 12 MG/DL (ref 6–23)
CALCIUM SERPL-MCNC: 9.7 MG/DL (ref 8.6–10.3)
CHLORIDE SERPL-SCNC: 107 MMOL/L (ref 98–107)
CHOLEST SERPL-MCNC: 198 MG/DL (ref 0–199)
CHOLESTEROL/HDL RATIO: 5.6
CO2 SERPL-SCNC: 27 MMOL/L (ref 21–32)
CREAT SERPL-MCNC: 0.81 MG/DL (ref 0.5–1.05)
EGFRCR SERPLBLD CKD-EPI 2021: 75 ML/MIN/1.73M*2
GLUCOSE SERPL-MCNC: 88 MG/DL (ref 74–99)
HCV AB SER QL: NONREACTIVE
HDLC SERPL-MCNC: 35.2 MG/DL
LDLC SERPL CALC-MCNC: 112 MG/DL
NON HDL CHOLESTEROL: 163 MG/DL (ref 0–149)
POTASSIUM SERPL-SCNC: 4.6 MMOL/L (ref 3.5–5.3)
PROT SERPL-MCNC: 6.6 G/DL (ref 6.4–8.2)
SODIUM SERPL-SCNC: 142 MMOL/L (ref 136–145)
T4 FREE SERPL-MCNC: 1.43 NG/DL (ref 0.61–1.12)
TRIGL SERPL-MCNC: 252 MG/DL (ref 0–149)
TSH SERPL-ACNC: 0.2 MIU/L (ref 0.44–3.98)
VIT B12 SERPL-MCNC: 252 PG/ML (ref 211–911)
VLDL: 50 MG/DL (ref 0–40)

## 2024-10-21 PROCEDURE — 82306 VITAMIN D 25 HYDROXY: CPT

## 2024-10-21 PROCEDURE — 80053 COMPREHEN METABOLIC PANEL: CPT

## 2024-10-21 PROCEDURE — 80061 LIPID PANEL: CPT

## 2024-10-21 PROCEDURE — 84439 ASSAY OF FREE THYROXINE: CPT

## 2024-10-21 PROCEDURE — 36415 COLL VENOUS BLD VENIPUNCTURE: CPT

## 2024-10-21 PROCEDURE — 84443 ASSAY THYROID STIM HORMONE: CPT

## 2024-10-21 PROCEDURE — 82607 VITAMIN B-12: CPT

## 2024-10-21 PROCEDURE — 86803 HEPATITIS C AB TEST: CPT

## 2024-10-30 ENCOUNTER — APPOINTMENT (OUTPATIENT)
Dept: PRIMARY CARE | Facility: CLINIC | Age: 79
End: 2024-10-30
Payer: MEDICARE

## 2024-10-30 VITALS
HEART RATE: 68 BPM | WEIGHT: 192 LBS | SYSTOLIC BLOOD PRESSURE: 112 MMHG | BODY MASS INDEX: 29.1 KG/M2 | HEIGHT: 68 IN | DIASTOLIC BLOOD PRESSURE: 70 MMHG

## 2024-10-30 DIAGNOSIS — J44.89 COPD (CHRONIC OBSTRUCTIVE PULMONARY DISEASE) WITH CHRONIC BRONCHITIS (MULTI): ICD-10-CM

## 2024-10-30 DIAGNOSIS — Z00.00 ROUTINE GENERAL MEDICAL EXAMINATION AT HEALTH CARE FACILITY: ICD-10-CM

## 2024-10-30 DIAGNOSIS — E53.8 VITAMIN B12 DEFICIENCY: ICD-10-CM

## 2024-10-30 DIAGNOSIS — E55.9 VITAMIN D DEFICIENCY: ICD-10-CM

## 2024-10-30 DIAGNOSIS — M15.9 GENERALIZED OSTEOARTHRITIS OF MULTIPLE SITES: ICD-10-CM

## 2024-10-30 DIAGNOSIS — Z00.00 MEDICARE ANNUAL WELLNESS VISIT, SUBSEQUENT: Primary | ICD-10-CM

## 2024-10-30 DIAGNOSIS — E06.3 HYPOTHYROIDISM DUE TO HASHIMOTO'S THYROIDITIS: ICD-10-CM

## 2024-10-30 DIAGNOSIS — D89.89 CFIDS (CHRONIC FATIGUE AND IMMUNE DYSFUNCTION SYNDROME) (MULTI): ICD-10-CM

## 2024-10-30 DIAGNOSIS — G93.32 CFIDS (CHRONIC FATIGUE AND IMMUNE DYSFUNCTION SYNDROME) (MULTI): ICD-10-CM

## 2024-10-30 PROCEDURE — 1159F MED LIST DOCD IN RCRD: CPT | Performed by: INTERNAL MEDICINE

## 2024-10-30 PROCEDURE — 99497 ADVNCD CARE PLAN 30 MIN: CPT | Performed by: INTERNAL MEDICINE

## 2024-10-30 PROCEDURE — 1160F RVW MEDS BY RX/DR IN RCRD: CPT | Performed by: INTERNAL MEDICINE

## 2024-10-30 PROCEDURE — G0439 PPPS, SUBSEQ VISIT: HCPCS | Performed by: INTERNAL MEDICINE

## 2024-10-30 PROCEDURE — 1158F ADVNC CARE PLAN TLK DOCD: CPT | Performed by: INTERNAL MEDICINE

## 2024-10-30 PROCEDURE — 1170F FXNL STATUS ASSESSED: CPT | Performed by: INTERNAL MEDICINE

## 2024-10-30 PROCEDURE — 99214 OFFICE O/P EST MOD 30 MIN: CPT | Performed by: INTERNAL MEDICINE

## 2024-10-30 PROCEDURE — 96372 THER/PROPH/DIAG INJ SC/IM: CPT | Performed by: INTERNAL MEDICINE

## 2024-10-30 PROCEDURE — 1123F ACP DISCUSS/DSCN MKR DOCD: CPT | Performed by: INTERNAL MEDICINE

## 2024-10-30 PROCEDURE — 1036F TOBACCO NON-USER: CPT | Performed by: INTERNAL MEDICINE

## 2024-10-30 PROCEDURE — G0444 DEPRESSION SCREEN ANNUAL: HCPCS | Performed by: INTERNAL MEDICINE

## 2024-10-30 RX ORDER — CYANOCOBALAMIN 1000 UG/ML
1000 INJECTION, SOLUTION INTRAMUSCULAR; SUBCUTANEOUS ONCE
Status: COMPLETED | OUTPATIENT
Start: 2024-10-30 | End: 2024-10-30

## 2024-10-30 RX ORDER — LEVOTHYROXINE SODIUM 100 UG/1
100 TABLET ORAL DAILY
Qty: 30 TABLET | Refills: 11 | Status: SHIPPED | OUTPATIENT
Start: 2024-10-30 | End: 2025-10-30

## 2024-10-30 ASSESSMENT — ENCOUNTER SYMPTOMS
BACK PAIN: 1
MYALGIAS: 1
DEPRESSION: 0
LOSS OF SENSATION IN FEET: 0
FATIGUE: 1
OCCASIONAL FEELINGS OF UNSTEADINESS: 0
ARTHRALGIAS: 1

## 2024-10-30 ASSESSMENT — ANXIETY QUESTIONNAIRES
5. BEING SO RESTLESS THAT IT IS HARD TO SIT STILL: NOT AT ALL
2. NOT BEING ABLE TO STOP OR CONTROL WORRYING: NOT AT ALL
3. WORRYING TOO MUCH ABOUT DIFFERENT THINGS: NOT AT ALL
6. BECOMING EASILY ANNOYED OR IRRITABLE: NOT AT ALL
IF YOU CHECKED OFF ANY PROBLEMS ON THIS QUESTIONNAIRE, HOW DIFFICULT HAVE THESE PROBLEMS MADE IT FOR YOU TO DO YOUR WORK, TAKE CARE OF THINGS AT HOME, OR GET ALONG WITH OTHER PEOPLE: NOT DIFFICULT AT ALL
1. FEELING NERVOUS, ANXIOUS, OR ON EDGE: NOT AT ALL
GAD7 TOTAL SCORE: 0
7. FEELING AFRAID AS IF SOMETHING AWFUL MIGHT HAPPEN: NOT AT ALL
4. TROUBLE RELAXING: NOT AT ALL

## 2024-10-30 ASSESSMENT — ACTIVITIES OF DAILY LIVING (ADL)
BATHING: INDEPENDENT
GROCERY_SHOPPING: INDEPENDENT
MANAGING_FINANCES: INDEPENDENT
DRESSING: INDEPENDENT
TAKING_MEDICATION: INDEPENDENT
DOING_HOUSEWORK: INDEPENDENT

## 2024-11-27 ENCOUNTER — APPOINTMENT (OUTPATIENT)
Dept: PRIMARY CARE | Facility: CLINIC | Age: 79
End: 2024-11-27
Payer: MEDICARE

## 2024-11-27 DIAGNOSIS — E53.8 VITAMIN B12 DEFICIENCY: ICD-10-CM

## 2024-11-27 PROCEDURE — 96372 THER/PROPH/DIAG INJ SC/IM: CPT | Performed by: INTERNAL MEDICINE

## 2024-11-27 RX ORDER — CYANOCOBALAMIN 1000 UG/ML
1000 INJECTION, SOLUTION INTRAMUSCULAR; SUBCUTANEOUS ONCE
Status: COMPLETED | OUTPATIENT
Start: 2024-11-27 | End: 2024-11-27

## 2024-11-27 NOTE — PROGRESS NOTES
Subjective   Patient ID: Jie Crocker is a 79 y.o. female who presents for Nurse Visit (B12 injection ).    HPI     Review of Systems    Objective   There were no vitals taken for this visit.    Physical Exam    Assessment/Plan

## 2024-12-09 ENCOUNTER — APPOINTMENT (OUTPATIENT)
Dept: RADIOLOGY | Facility: HOSPITAL | Age: 79
End: 2024-12-09
Payer: MEDICARE

## 2024-12-09 ENCOUNTER — HOSPITAL ENCOUNTER (EMERGENCY)
Facility: HOSPITAL | Age: 79
Discharge: HOME | End: 2024-12-09
Attending: EMERGENCY MEDICINE
Payer: MEDICARE

## 2024-12-09 VITALS
HEIGHT: 67 IN | HEART RATE: 65 BPM | TEMPERATURE: 97.5 F | DIASTOLIC BLOOD PRESSURE: 90 MMHG | OXYGEN SATURATION: 97 % | RESPIRATION RATE: 16 BRPM | BODY MASS INDEX: 30.13 KG/M2 | WEIGHT: 192 LBS | SYSTOLIC BLOOD PRESSURE: 145 MMHG

## 2024-12-09 DIAGNOSIS — S13.9XXA NECK SPRAIN, INITIAL ENCOUNTER: ICD-10-CM

## 2024-12-09 DIAGNOSIS — S00.83XA CONTUSION OF FACE, INITIAL ENCOUNTER: Primary | ICD-10-CM

## 2024-12-09 PROCEDURE — 73030 X-RAY EXAM OF SHOULDER: CPT | Mod: LEFT SIDE | Performed by: RADIOLOGY

## 2024-12-09 PROCEDURE — 70486 CT MAXILLOFACIAL W/O DYE: CPT

## 2024-12-09 PROCEDURE — 72125 CT NECK SPINE W/O DYE: CPT

## 2024-12-09 PROCEDURE — 73030 X-RAY EXAM OF SHOULDER: CPT | Mod: LT

## 2024-12-09 PROCEDURE — 70450 CT HEAD/BRAIN W/O DYE: CPT

## 2024-12-09 PROCEDURE — 99284 EMERGENCY DEPT VISIT MOD MDM: CPT | Mod: 25 | Performed by: EMERGENCY MEDICINE

## 2024-12-09 PROCEDURE — 70450 CT HEAD/BRAIN W/O DYE: CPT | Performed by: RADIOLOGY

## 2024-12-09 PROCEDURE — 76377 3D RENDER W/INTRP POSTPROCES: CPT

## 2024-12-09 PROCEDURE — 72125 CT NECK SPINE W/O DYE: CPT | Performed by: RADIOLOGY

## 2024-12-09 ASSESSMENT — LIFESTYLE VARIABLES
HAVE PEOPLE ANNOYED YOU BY CRITICIZING YOUR DRINKING: NO
EVER FELT BAD OR GUILTY ABOUT YOUR DRINKING: NO
TOTAL SCORE: 0
HAVE YOU EVER FELT YOU SHOULD CUT DOWN ON YOUR DRINKING: NO
EVER HAD A DRINK FIRST THING IN THE MORNING TO STEADY YOUR NERVES TO GET RID OF A HANGOVER: NO

## 2024-12-09 ASSESSMENT — PAIN SCALES - GENERAL: PAINLEVEL_OUTOF10: 8

## 2024-12-09 ASSESSMENT — COLUMBIA-SUICIDE SEVERITY RATING SCALE - C-SSRS
2. HAVE YOU ACTUALLY HAD ANY THOUGHTS OF KILLING YOURSELF?: NO
6. HAVE YOU EVER DONE ANYTHING, STARTED TO DO ANYTHING, OR PREPARED TO DO ANYTHING TO END YOUR LIFE?: NO
1. IN THE PAST MONTH, HAVE YOU WISHED YOU WERE DEAD OR WISHED YOU COULD GO TO SLEEP AND NOT WAKE UP?: NO

## 2024-12-09 ASSESSMENT — PAIN DESCRIPTION - PAIN TYPE: TYPE: ACUTE PAIN

## 2024-12-09 ASSESSMENT — PAIN DESCRIPTION - LOCATION: LOCATION: NECK

## 2024-12-09 ASSESSMENT — PAIN - FUNCTIONAL ASSESSMENT: PAIN_FUNCTIONAL_ASSESSMENT: 0-10

## 2024-12-09 ASSESSMENT — PAIN DESCRIPTION - DESCRIPTORS: DESCRIPTORS: THROBBING

## 2024-12-09 NOTE — ED TRIAGE NOTES
Last Sunday she fell at [employer] hit her head off a pew and has left arm pain and bilateral bruising on her eyes and bump on her forehead along with a bruise. She does not take blood thinners negative LOC

## 2024-12-09 NOTE — ED PROVIDER NOTES
HPI   Chief Complaint   Patient presents with    Fall    Neck Pain    Arm Pain     Left arm       Patient presents emergency department secondary to a fall.  Patient fell a little over a week ago.  Patient struck her head on a pew at [employer].  No loss of consciousness.  Patient is not on blood thinning medications.  Patient has bruising to the forehead and periorbital area.  She has pain with range of motion of the left shoulder.  She was concerned because the symptoms are not improving quickly and the bruising is not resolving so she presents here to be evaluated.  No chest pain or shortness of breath.  No abdominal pain.  No nausea or vomiting.  No change in bowel movements.  No change in urination.  No other complaints at this time.                          Fraser Coma Scale Score: 15                  Patient History   Past Medical History:   Diagnosis Date    Allergic contact dermatitis due to plants, except food 07/09/2021    Contact dermatitis due to poison ivy    Cervical disc disorder, unspecified, unspecified cervical region 08/05/2020    Cervical disc disease    Deficiency of other specified B group vitamins     Vitamin B 12 deficiency    Hypermobility syndrome 08/31/2020    Hypermobile joint syndrome of ankle    Hypothyroidism, unspecified 12/22/2020    Primary hypothyroidism    Localized edema 08/27/2020    Bilateral edema of lower extremity    Low back pain, unspecified 09/29/2020    Chronic midline low back pain, unspecified whether sciatica present    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    Chronic fatigue syndrome with fibromyalgia    Neuralgia and neuritis, unspecified 08/05/2020    Thoracic neuralgia    Obesity, unspecified 02/13/2020    Class 1 obesity without serious comorbidity with body mass index (BMI) of 32.0 to 32.9 in adult, unspecified obesity type    Other fecal abnormalities 11/15/2021    Positive colorectal cancer screening using Cologuard test    Other low back  pain 07/05/2022    Intractable low back pain    Other shoulder lesions, right shoulder 02/13/2020    Rotator cuff tendonitis, right    Other symptoms and signs involving the musculoskeletal system 12/13/2021    Weakness of lower extremity    Other symptoms and signs involving the musculoskeletal system 10/27/2021    Weakness of left upper extremity    Other thyrotoxicosis without thyrotoxic crisis or storm 01/11/2021    Hyperthyroidism with Hashimoto disease    Pain in left elbow 09/16/2021    Elbow pain, left    Pain in left knee 12/13/2021    Acute pain of left knee    Pain in right finger(s) 01/03/2017    Thumb pain, right    Pain in right hip 01/03/2017    Pain of right hip joint    Pain in thoracic spine 08/05/2020    Chronic midline thoracic back pain    Pain in thoracic spine 08/05/2020    Chronic bilateral thoracic back pain    Personal history of diseases of the blood and blood-forming organs and certain disorders involving the immune mechanism 02/10/2022    History of anemia    Personal history of other diseases of the musculoskeletal system and connective tissue 03/08/2022    History of joint pain    Personal history of other diseases of the musculoskeletal system and connective tissue 07/25/2022    History of muscle spasm    Personal history of other diseases of the musculoskeletal system and connective tissue     History of back pain    Personal history of other diseases of the respiratory system     History of chronic obstructive lung disease    Personal history of other endocrine, nutritional and metabolic disease 09/03/2020    History of hyperthyroidism    Personal history of other endocrine, nutritional and metabolic disease 07/10/2021    History of hypothyroidism    Personal history of other endocrine, nutritional and metabolic disease 08/31/2020    History of vitamin B deficiency    Personal history of other endocrine, nutritional and metabolic disease     History of hypothyroidism    Personal  history of other specified conditions 07/05/2022    History of balance disorder    Personal history of other specified conditions 06/24/2017    History of abdominal pain    Radiculopathy, cervical region 11/08/2021    Cervical neuritis    Repeated falls 07/05/2022    Multiple falls    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    Severe persistent asthma, uncomplicated (Multi) 07/21/2021    Severe persistent asthmatic bronchitis without complication    Unspecified abdominal pain 11/15/2021    Intermittent abdominal pain    Unspecified dislocation of left ulnohumeral joint, initial encounter 10/06/2021    Dislocation of left elbow, initial encounter    Venous insufficiency (chronic) (peripheral) 02/14/2022    Chronic venous insufficiency     Past Surgical History:   Procedure Laterality Date    ABDOMINAL SACROCOLPOPEXY N/A 12/13/2023    laparoscopic with lysis of adhesions    OTHER SURGICAL HISTORY  09/03/2020    Laparoscopic hysterectomy    OTHER SURGICAL HISTORY  09/03/2020    Lumpectomy    OTHER SURGICAL HISTORY  09/03/2020    Tonsillectomy    OTHER SURGICAL HISTORY  09/03/2020    Cystoscopy    OTHER SURGICAL HISTORY  09/03/2020    Corneal lasik    OTHER SURGICAL HISTORY  11/13/2019    left     Family History   Problem Relation Name Age of Onset    Heart attack Mother          age 52    Heart attack Other      Dementia Other       Social History     Tobacco Use    Smoking status: Never    Smokeless tobacco: Never   Vaping Use    Vaping status: Never Used   Substance Use Topics    Alcohol use: Not Currently    Drug use: Never       Physical Exam   ED Triage Vitals [12/09/24 1200]   Temperature Heart Rate Respirations BP   36.4 °C (97.5 °F) 65 16 145/90      Pulse Ox Temp Source Heart Rate Source Patient Position   97 % Temporal Monitor --      BP Location FiO2 (%)     -- --       Physical Exam  Vitals and nursing note reviewed.   Constitutional:       Appearance: Normal appearance.    HENT:      Head:      Comments: Patient has periorbital bruising and a large hematoma to the anterior forehead.     Right Ear: Tympanic membrane normal.      Left Ear: Tympanic membrane normal.      Nose: Nose normal.      Mouth/Throat:      Mouth: Mucous membranes are moist.   Eyes:      Extraocular Movements: Extraocular movements intact.      Pupils: Pupils are equal, round, and reactive to light.   Neck:      Comments: Mild pain with range of motion.  No step-offs.  Cardiovascular:      Rate and Rhythm: Normal rate and regular rhythm.      Pulses: Normal pulses.      Heart sounds: Normal heart sounds.   Pulmonary:      Effort: Pulmonary effort is normal.      Breath sounds: Normal breath sounds.   Abdominal:      General: Abdomen is flat. Bowel sounds are normal.      Palpations: Abdomen is soft.      Tenderness: There is no abdominal tenderness. There is no guarding or rebound.   Musculoskeletal:         General: No deformity.      Cervical back: Normal range of motion.      Comments: Pain with external rotation of the shoulder.   Skin:     General: Skin is warm and dry.      Capillary Refill: Capillary refill takes less than 2 seconds.   Neurological:      General: No focal deficit present.      Mental Status: She is alert and oriented to person, place, and time.   Psychiatric:         Mood and Affect: Mood normal.         Behavior: Behavior normal.       Labs Reviewed - No data to display  Pain Management Panel           No data to display              CT head wo IV contrast    (Results Pending)   CT cervical spine wo IV contrast    (Results Pending)   CT maxillofacial bones wo IV contrast    (Results Pending)   XR shoulder left 2+ views    (Results Pending)     ED Course & MDM   Diagnoses as of 12/09/24 1538   Contusion of face, initial encounter   Neck sprain, initial encounter       Medical Decision Making  Patient presents secondary to injury after a fall.  Patient is evaluated for acute traumatic  process using CT head, cervical spine and maxillofacial bones.  X-ray of the left shoulder is obtained.  Patient declined pain medication.  Patient CT scans are negative for acute traumatic process.  No evidence of intracranial hemorrhage or cervical spine fracture.  No evidence of maxillofacial fracture.  X-ray of the shoulder shows no evidence of acute disease.  At this time patient is hemodynamically stable.  She is to follow-up with her primary care physician or return here for new or worsening symptoms.        Procedure  Procedures     Zenobia Dillon MD  12/09/24 1535

## 2024-12-27 ENCOUNTER — APPOINTMENT (OUTPATIENT)
Dept: PRIMARY CARE | Facility: CLINIC | Age: 79
End: 2024-12-27
Payer: MEDICARE

## 2024-12-27 DIAGNOSIS — E53.8 VITAMIN B12 DEFICIENCY: ICD-10-CM

## 2024-12-27 PROCEDURE — 96372 THER/PROPH/DIAG INJ SC/IM: CPT | Performed by: INTERNAL MEDICINE

## 2024-12-27 RX ORDER — CYANOCOBALAMIN 1000 UG/ML
1000 INJECTION, SOLUTION INTRAMUSCULAR; SUBCUTANEOUS ONCE
Status: COMPLETED | OUTPATIENT
Start: 2024-12-27 | End: 2024-12-27

## 2025-01-22 ENCOUNTER — TELEPHONE (OUTPATIENT)
Dept: PRIMARY CARE | Facility: CLINIC | Age: 80
End: 2025-01-22
Payer: MEDICARE

## 2025-01-22 DIAGNOSIS — E06.3 HYPOTHYROIDISM DUE TO HASHIMOTO'S THYROIDITIS: ICD-10-CM

## 2025-01-22 RX ORDER — LEVOTHYROXINE SODIUM 100 UG/1
100 TABLET ORAL DAILY
Qty: 30 TABLET | Refills: 11 | Status: SHIPPED | OUTPATIENT
Start: 2025-01-22 | End: 2026-01-22

## 2025-01-22 NOTE — TELEPHONE ENCOUNTER
Medication: synthroid     Dosage: 100 mcg     Sig: Take 1 tablet (100 mcg) by mouth early in the morning.. Take on an empty stomach at the same time each day, either 30 to 60 minutes prior to breakfast     Dispense Quantity:    Refills:     Pharmacy: [facility]    LOV: 10/30/24    NOV: 4/30/25

## 2025-01-27 ENCOUNTER — TELEPHONE (OUTPATIENT)
Dept: PRIMARY CARE | Facility: CLINIC | Age: 80
End: 2025-01-27

## 2025-01-27 ENCOUNTER — APPOINTMENT (OUTPATIENT)
Dept: PRIMARY CARE | Facility: CLINIC | Age: 80
End: 2025-01-27
Payer: MEDICARE

## 2025-01-27 DIAGNOSIS — E53.8 VITAMIN B12 DEFICIENCY: ICD-10-CM

## 2025-01-27 DIAGNOSIS — M15.9 GENERALIZED OSTEOARTHRITIS OF MULTIPLE SITES: ICD-10-CM

## 2025-01-27 DIAGNOSIS — R26.81 GAIT INSTABILITY: Primary | ICD-10-CM

## 2025-01-27 PROCEDURE — 96372 THER/PROPH/DIAG INJ SC/IM: CPT | Performed by: INTERNAL MEDICINE

## 2025-01-27 RX ORDER — CYANOCOBALAMIN 1000 UG/ML
1000 INJECTION, SOLUTION INTRAMUSCULAR; SUBCUTANEOUS ONCE
Status: COMPLETED | OUTPATIENT
Start: 2025-01-27 | End: 2025-01-27

## 2025-01-27 RX ADMIN — CYANOCOBALAMIN 1000 MCG: 1000 INJECTION, SOLUTION INTRAMUSCULAR; SUBCUTANEOUS at 10:49

## 2025-01-27 NOTE — TELEPHONE ENCOUNTER
Patient had a fall December 1st 2024 while at [employer] patient tripped on the step and fell hit face as she was landing falling head first. Patient did not go to the ER that day she waited for about a week. She were bruised face/eyes, swollen, this fall pushed her head/neck into shoulders. Patient still having neck/shoulder pain left side. Patient also still has a painful lump on forehead it also throbs.   Patient is seeing chiropractor which recommend to talk to PCP about getting physical therapy also is seeing ortho next Monday.   Also states that she has had problems with her back and this fall made it worse and spasm  Patient noticed balance if off and getting worse.  Patient posture is getting bad.   Patient is asking for an order for physical therapy referral for  all of this.   Back, neck, shoulder, balance, spasm of back, posture.

## 2025-02-03 ENCOUNTER — APPOINTMENT (OUTPATIENT)
Dept: ORTHOPEDIC SURGERY | Facility: CLINIC | Age: 80
End: 2025-02-03
Payer: MEDICARE

## 2025-02-03 VITALS — WEIGHT: 190 LBS | HEIGHT: 68 IN | BODY MASS INDEX: 28.79 KG/M2

## 2025-02-03 DIAGNOSIS — M54.50 LOW BACK PAIN, UNSPECIFIED BACK PAIN LATERALITY, UNSPECIFIED CHRONICITY, UNSPECIFIED WHETHER SCIATICA PRESENT: ICD-10-CM

## 2025-02-03 DIAGNOSIS — M25.812 SHOULDER IMPINGEMENT, LEFT: Primary | ICD-10-CM

## 2025-02-03 DIAGNOSIS — M25.511 BILATERAL SHOULDER PAIN, UNSPECIFIED CHRONICITY: ICD-10-CM

## 2025-02-03 DIAGNOSIS — M54.2 NECK PAIN: ICD-10-CM

## 2025-02-03 DIAGNOSIS — M25.512 BILATERAL SHOULDER PAIN, UNSPECIFIED CHRONICITY: ICD-10-CM

## 2025-02-03 DIAGNOSIS — M62.81 GENERALIZED MUSCLE WEAKNESS: ICD-10-CM

## 2025-02-03 PROCEDURE — 1160F RVW MEDS BY RX/DR IN RCRD: CPT | Performed by: STUDENT IN AN ORGANIZED HEALTH CARE EDUCATION/TRAINING PROGRAM

## 2025-02-03 PROCEDURE — 1159F MED LIST DOCD IN RCRD: CPT | Performed by: STUDENT IN AN ORGANIZED HEALTH CARE EDUCATION/TRAINING PROGRAM

## 2025-02-03 PROCEDURE — 1036F TOBACCO NON-USER: CPT | Performed by: STUDENT IN AN ORGANIZED HEALTH CARE EDUCATION/TRAINING PROGRAM

## 2025-02-03 PROCEDURE — 99214 OFFICE O/P EST MOD 30 MIN: CPT | Performed by: STUDENT IN AN ORGANIZED HEALTH CARE EDUCATION/TRAINING PROGRAM

## 2025-02-03 ASSESSMENT — PAIN - FUNCTIONAL ASSESSMENT: PAIN_FUNCTIONAL_ASSESSMENT: NO/DENIES PAIN

## 2025-02-03 NOTE — PROGRESS NOTES
PRIMARY CARE PHYSICIAN: Magadleno Nielson DO  REFERRING PROVIDER: No referring provider defined for this encounter.     CONSULT ORTHOPAEDIC: Shoulder Evaluation    ASSESSMENT & PLAN    Impression: 79 y.o. female with left shoulder impingement and rotator cuff tendinosis.    Plan:   I explained to the patient the nature of their diagnosis.  I reviewed their imaging studies with them.    Based on the history, physical exam and imaging studies above, the patient's presentation is consistent with the above diagnosis.  I had a long discussion with the patient regarding their presentation and the treatment options.  We discussed initial nonoperative versus operative management options as well as potential further diagnostic imaging.  I reviewed her x-ray findings with her.  We discussed nonoperative management for this.  I provided her with a referral to physical therapy to address a number of her ongoing problems including her bilateral shoulder pain/impingement, neck pain and gait instability.  I provided her with a referral to my physical medicine rehabilitation colleague for these complaints as well.  The patient will continue to work on her gait training and stability.  I discussed with her the possibility of using a cane for balance.    Follow-Up: Patient will follow-up with me as needed    At the end of the visit, all questions were answered in full. The patient is in agreement with the plan and recommendations. They will call the office with any questions/concerns.    Note dictated with InstallFree software. Completed without full typed error editing and sent to avoid delay.       SUBJECTIVE  CHIEF COMPLAINT:   Chief Complaint   Patient presents with    Left Shoulder - Pain        HPI: Jie Crocker is a 79 y.o. patient who presents today with left shoulder problems since a fall in 12/1/2024. Pain is anterior and goes down the arm. Hx of multiple falls. Reports crepitus. No Hx of previous  trauma, Sx, or injections to the area. Reports associated neck pain. No diabetes. Seen in the ED and had XR done 12/9/2024. Will start physical therapy tomorrow at [facility]. Mendel MARTINEZ.  She states that she has significant instability and difficulties with her gait.  She is resistant to using any sort of assistive device.  She notes pain in her neck, bilateral shoulders, legs and feet.    No numbness, tingling, or paresthesias.    REVIEW OF SYSTEMS  Constitutional: See HPI for pain assessment, No significant weight loss, recent trauma  Cardiovascular: No chest pain, shortness of breath  Respiratory: No difficulty breathing, cough  Gastrointestinal: No nausea, vomiting, diarrhea, constipation  Musculoskeletal: Noted in HPI, positive for pain, restricted motion, stiffness  Integumentary: No rashes, easy bruising, redness   Neurological: no numbness or tingling in extremities, no gait disturbances   Psychiatric: No mood changes, memory changes, social issues  Heme/Lymph: no excessive swelling, easy bruising, excessive bleeding  ENT: No hearing changes  Eyes: No vision changes    Past Medical History:   Diagnosis Date    Allergic contact dermatitis due to plants, except food 07/09/2021    Contact dermatitis due to poison ivy    Cervical disc disorder, unspecified, unspecified cervical region 08/05/2020    Cervical disc disease    Deficiency of other specified B group vitamins     Vitamin B 12 deficiency    Hypermobility syndrome 08/31/2020    Hypermobile joint syndrome of ankle    Hypothyroidism, unspecified 12/22/2020    Primary hypothyroidism    Localized edema 08/27/2020    Bilateral edema of lower extremity    Low back pain, unspecified 09/29/2020    Chronic midline low back pain, unspecified whether sciatica present    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    Chronic fatigue syndrome with fibromyalgia    Neuralgia and neuritis, unspecified 08/05/2020    Thoracic neuralgia    Obesity,  unspecified 02/13/2020    Class 1 obesity without serious comorbidity with body mass index (BMI) of 32.0 to 32.9 in adult, unspecified obesity type    Other fecal abnormalities 11/15/2021    Positive colorectal cancer screening using Cologuard test    Other low back pain 07/05/2022    Intractable low back pain    Other shoulder lesions, right shoulder 02/13/2020    Rotator cuff tendonitis, right    Other symptoms and signs involving the musculoskeletal system 12/13/2021    Weakness of lower extremity    Other symptoms and signs involving the musculoskeletal system 10/27/2021    Weakness of left upper extremity    Other thyrotoxicosis without thyrotoxic crisis or storm 01/11/2021    Hyperthyroidism with Hashimoto disease    Pain in left elbow 09/16/2021    Elbow pain, left    Pain in left knee 12/13/2021    Acute pain of left knee    Pain in right finger(s) 01/03/2017    Thumb pain, right    Pain in right hip 01/03/2017    Pain of right hip joint    Pain in thoracic spine 08/05/2020    Chronic midline thoracic back pain    Pain in thoracic spine 08/05/2020    Chronic bilateral thoracic back pain    Personal history of diseases of the blood and blood-forming organs and certain disorders involving the immune mechanism 02/10/2022    History of anemia    Personal history of other diseases of the musculoskeletal system and connective tissue 03/08/2022    History of joint pain    Personal history of other diseases of the musculoskeletal system and connective tissue 07/25/2022    History of muscle spasm    Personal history of other diseases of the musculoskeletal system and connective tissue     History of back pain    Personal history of other diseases of the respiratory system     History of chronic obstructive lung disease    Personal history of other endocrine, nutritional and metabolic disease 09/03/2020    History of hyperthyroidism    Personal history of other endocrine, nutritional and metabolic disease 07/10/2021     History of hypothyroidism    Personal history of other endocrine, nutritional and metabolic disease 08/31/2020    History of vitamin B deficiency    Personal history of other endocrine, nutritional and metabolic disease     History of hypothyroidism    Personal history of other specified conditions 07/05/2022    History of balance disorder    Personal history of other specified conditions 06/24/2017    History of abdominal pain    Radiculopathy, cervical region 11/08/2021    Cervical neuritis    Repeated falls 07/05/2022    Multiple falls    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    Severe persistent asthma, uncomplicated (Multi) 07/21/2021    Severe persistent asthmatic bronchitis without complication    Unspecified abdominal pain 11/15/2021    Intermittent abdominal pain    Unspecified dislocation of left ulnohumeral joint, initial encounter 10/06/2021    Dislocation of left elbow, initial encounter    Venous insufficiency (chronic) (peripheral) 02/14/2022    Chronic venous insufficiency        Allergies   Allergen Reactions    Mold Shortness of breath    Corticosteroids (Glucocorticoids) Other     Leg cramps    Darvocet A500 [Propoxyphene N-Acetaminophen] Blurred vision    Ketamine Hallucinations    Pollen Extracts Unknown    Propoxyphene-Acetaminophen Other    Adhesive Tape-Silicones Rash    Piroxicam Other     burning to eyes and swelling        Past Surgical History:   Procedure Laterality Date    ABDOMINAL SACROCOLPOPEXY N/A 12/13/2023    laparoscopic with lysis of adhesions    OTHER SURGICAL HISTORY  09/03/2020    Laparoscopic hysterectomy    OTHER SURGICAL HISTORY  09/03/2020    Lumpectomy    OTHER SURGICAL HISTORY  09/03/2020    Tonsillectomy    OTHER SURGICAL HISTORY  09/03/2020    Cystoscopy    OTHER SURGICAL HISTORY  09/03/2020    Corneal lasik    OTHER SURGICAL HISTORY  11/13/2019    left        Family History   Problem Relation Name Age of Onset    Heart attack Mother           age 52    Heart attack Other      Dementia Other          Social History     Socioeconomic History    Marital status:      Spouse name: Not on file    Number of children: Not on file    Years of education: Not on file    Highest education level: Not on file   Occupational History    Not on file   Tobacco Use    Smoking status: Never    Smokeless tobacco: Never   Vaping Use    Vaping status: Never Used   Substance and Sexual Activity    Alcohol use: Not Currently    Drug use: Never    Sexual activity: Not Currently   Other Topics Concern    Not on file   Social History Narrative    Not on file     Social Drivers of Health     Financial Resource Strain: Low Risk  (12/13/2023)    Overall Financial Resource Strain (CARDIA)     Difficulty of Paying Living Expenses: Not hard at all   Food Insecurity: Not on file   Transportation Needs: No Transportation Needs (12/13/2023)    PRAPARE - Transportation     Lack of Transportation (Medical): No     Lack of Transportation (Non-Medical): No   Physical Activity: Insufficiently Active (12/13/2023)    Exercise Vital Sign     Days of Exercise per Week: 4 days     Minutes of Exercise per Session: 30 min   Stress: Not on file   Social Connections: Not on file   Intimate Partner Violence: Not on file   Housing Stability: Low Risk  (12/13/2023)    Housing Stability Vital Sign     Unable to Pay for Housing in the Last Year: No     Number of Places Lived in the Last Year: 1     Unstable Housing in the Last Year: No        CURRENT MEDICATIONS:   Current Outpatient Medications   Medication Sig Dispense Refill    albuterol 90 mcg/actuation inhaler Inhale 2 puffs every 6 hours if needed for wheezing.      budesonide-formoteroL (Symbicort) 160-4.5 mcg/actuation inhaler Inhale 2 puffs 2 times a day. 90 each 3    cholecalciferol (Vitamin D-3) 50 MCG (2000 UT) tablet Take 1 tablet (50 mcg) by mouth once daily.      cyanocobalamin (Vitamin B-12) 1,000 mcg/mL injection Inject 1 mL  "(1,000 mcg) into the muscle every 30 (thirty) days.      cyanocobalamin-cobamamide (B-12 Plus) 5,000-100 mcg tablet, sublingual Place 1 tablet under the tongue once daily.      estradiol (Estrace) 0.01 % (0.1 mg/gram) vaginal cream Insert 0.5 Applicatorfuls (2 g) into the vagina 1 (one) time per week.      Lactobacillus acidophilus (Acidophilus) capsule Take 1 capsule by mouth once daily.      magnesium chloride 71.5 mg tablet,delayed release (DR/EC) Take 2 tablets (143 mg) by mouth once daily.      montelukast (Singulair) 10 mg tablet TAKE 1 TABLET(10 MG) BY MOUTH DAILY AT BEDTIME 90 tablet 3    Synthroid 100 mcg tablet Take 1 tablet (100 mcg) by mouth early in the morning.. Take on an empty stomach at the same time each day, either 30 to 60 minutes prior to breakfast 30 tablet 11     No current facility-administered medications for this visit.        OBJECTIVE    PHYSICAL EXAM      12/14/2023     9:03 AM 2/6/2024    12:32 PM 3/28/2024    10:40 AM 4/30/2024     9:49 AM 5/20/2024    11:06 AM 10/30/2024    10:00 AM 12/9/2024    12:00 PM   Vitals   Systolic 112   110 120 112 145   Diastolic 70   70 70 70 90   BP Location    Left arm      Heart Rate 60   68 69 68 65   Temp       36.4 °C (97.5 °F)   Resp     16  16   Height  1.715 m (5' 7.5\") 1.715 m (5' 7.5\") 1.715 m (5' 7.5\") 1.715 m (5' 7.5\") 1.715 m (5' 7.5\") 1.702 m (5' 7\")   Weight (lb)  192 192 177 193 192 192   BMI  29.63 kg/m2 29.63 kg/m2 27.31 kg/m2 29.78 kg/m2 29.63 kg/m2 30.07 kg/m2   BSA (m2)  2.04 m2 2.04 m2 1.96 m2 2.04 m2 2.04 m2 2.03 m2   Visit Report  Report Report Report Report Report       There is no height or weight on file to calculate BMI.    GENERAL: A/Ox3, NAD. Appears healthy, well nourished  PSYCHIATRIC: Mood stable, appropriate memory recall  EYES: EOM intact, no scleral icterus  CARDIOVASCULAR: Palpable peripheral pulses  LUNGS: Breathing non-labored on room air  SKIN: no erythema, rashes, or ecchymosis     MUSCULOSKELETAL:  Laterality: " left Shoulder Exam  - Negative Spurlings, full painless neck ROM  - Skin intact  - No erythema or warmth  - No ecchymosis or soft tissue swelling  - Shoulder ROM: Forward flexion 150, ER 45, IR mid lumbar  - Strength:      Jobes 4+/5     ER 4+/5     Belly press and bearhug 4+/5  - Palpation: Positive tenderness biceps groove and posterolateral acromion  - Alvarado and Neers positive  - Speeds and O'Briens positive    NEUROVASCULAR:  - Neurovascular Status: sensation intact to light touch distally, upper extremity motor grossly intact  - Capillary refill brisk at extremities, Bilateral peripheral pulses 2+    Imaging: Multiple views of the affected left shoulder(s) demonstrate: No significant osseous normality, no fracture, minimal degenerative changes.   X-rays were personally reviewed and interpreted by me.  Radiology reports were reviewed by me as well, if readily available at the time.      Osmin Glasgow MD  Attending Surgeon    Sports Medicine Orthopaedic Surgery  [facility] [place]  [employer] [facility] [employer]

## 2025-02-05 ENCOUNTER — EVALUATION (OUTPATIENT)
Dept: PHYSICAL THERAPY | Facility: CLINIC | Age: 80
End: 2025-02-05
Payer: MEDICARE

## 2025-02-05 DIAGNOSIS — M54.2 NECK PAIN, CHRONIC: ICD-10-CM

## 2025-02-05 DIAGNOSIS — R26.89 BALANCE PROBLEM: ICD-10-CM

## 2025-02-05 DIAGNOSIS — R26.81 GAIT INSTABILITY: ICD-10-CM

## 2025-02-05 DIAGNOSIS — R53.1 GENERALIZED WEAKNESS: Primary | ICD-10-CM

## 2025-02-05 DIAGNOSIS — G89.29 NECK PAIN, CHRONIC: ICD-10-CM

## 2025-02-05 PROCEDURE — 97162 PT EVAL MOD COMPLEX 30 MIN: CPT | Mod: GP

## 2025-02-05 ASSESSMENT — ENCOUNTER SYMPTOMS
DEPRESSION: 0
OCCASIONAL FEELINGS OF UNSTEADINESS: 1
LOSS OF SENSATION IN FEET: 1

## 2025-02-05 ASSESSMENT — PAIN SCALES - GENERAL
PAINLEVEL_OUTOF10: 0 - NO PAIN
PAINLEVEL_OUTOF10: 0 - NO PAIN
PAINLEVEL_OUTOF10: 4

## 2025-02-05 ASSESSMENT — PAIN - FUNCTIONAL ASSESSMENT: PAIN_FUNCTIONAL_ASSESSMENT: 0-10

## 2025-02-05 NOTE — PROGRESS NOTES
Physical Therapy    Physical Therapy Evaluation    Patient Name: Jie Crocker  MRN: [id number]  Today's Date: 2/5/25  Name and date of birth 1945  were confirmed at the start of today's session.     Time Entry:  Time Calculation  Start Time: 1200  Stop Time: 1250  Time Calculation (min): 50 min  PT Evaluation Time Entry  PT Evaluation (Moderate) Time Entry: 50                      Assessment  PT Assessment Results: Decreased strength, Impaired balance, Decreased mobility, Pain  Rehab Prognosis: Good  Assessment Comment: Jie presents with weakness, balance and gait deficits; flare up of right low back pain; and neck/ shoulder pain since a recent fall in December 2024.  She will benefit from skilled outpatient PT to address her deficitis; further assess her neck/ shoulder pain; learn self management strategies and work to achieve consistent pain relief for improved function with her daily activities.    Plan     Treatment/Interventions: Education/ Instruction, Gait training, Manual therapy, Therapeutic activities, Therapeutic exercises, Neuromuscular re-education  PT Plan: Skilled PT  PT Frequency:  (1-2 times/ week)  Duration: 8 weeks ;pending progress  Onset Date: 12/01/24  Certification Period Start Date: 02/05/25  Certification Period End Date: 05/06/25  Number of Treatments Authorized: based on med necessity  Rehab Potential: Good  Plan of Care Agreement: Patient    Current Problem  1. Generalized weakness  Follow Up In Physical Therapy      2. Gait instability  Referral to Physical Therapy    Follow Up In Physical Therapy      3. Neck pain, chronic  Follow Up In Physical Therapy      4. Balance problem  Follow Up In Physical Therapy          Subjective   General:  General  Reason for Referral: Gait Instability  Referred By: Dr Heidy Nielson  Past Medical History Relevant to Rehab: Reviewed in Epic and on Rehab intake form  General Comment: Jie comes to PT with concerns of decreased balance and  unsteady gait.  She reports falling (in [place] in December and feel head first into a pew after missing a step).  (5th fall since getting her Covid vaccinations); and got black eyes/ bruising. She hit her head hard and now also has neck and left shoulder pain. She saw Dr Glasgow and he did xrays and referred her for PT. She has prior right shoulder pain from previous RTC tear.  She also report some right lower back spasms.  She denies dizziness.  Jie also has a new referral in the system to address her neck/ shoulder pain. This will be addressed on her first follow up visit.    Precautions:  Precautions  MASONADI Fall Risk Score (The score of 4 or more indicates an increased risk of falling): 6  Precautions Comment: Fall risk    Pain:  Pain Assessment: 0-10  0-10 (Numeric) Pain Score: 4 (8/10 at worst  (with use of left arm))  Pain Type: Chronic pain  Pain Location: Neck  Pain Descriptors:  (stiff)  Pain Frequency: Constant/continuous       02/05/25 1200   Pain 2   Pain Score 2 0 - No pain  (worse with laying/ use 10-12/10)   Pain Type 2 Chronic pain   Pain Location 2 Shoulder   Pain Orientation 2 Left        02/05/25 1200   Pain 3   Pain Score 3 0 - No pain  (10/10  ; with a mm spasm/ getting out of car)   Pain Type 3 Chronic pain   Pain Location 3 Back   Pain Orientation 3 Lower   Pain Radiating Towards 3 right hip     Home Living:  Home Living Comment: Two story; 1 rail.  Lives with brother.  Prior Function Per Pt/Caregiver Report:  Level of Bath:  (Independent with limited activities)  Receives Help From:  (Brother does most of the household chores in/out)  Vocational: Retired  Leisure: Quilting  Prior Function Comments: Independent    Objective   Posture:    Forward head, round shoulders. Tends to lean forward to get relief of pain after sitting upright unsupported x 10 min or standing.     Range of Motion:   Trunk AROM is WFL all planes; increased pain into right low back and buttock with  flexion.    Strength:     Lower Extremity Strength Right Left   Hip Flexors 4- 4   Hip Abductors 4 4   Hip Extensors 4 4   Hip Adductors 4+ 4+   Quads 4+ 4+   Hamstrings 4+ 4+   Gastroc/Soleus 3+  DHR only 3+  DHR only   Ankle Dorsiflexors 5 5        30 sec chair rise  Painful/difficulty to do 1 rep without HHA        Abdominals 3+   Trunk extensors 4-;   fatigues with standing and sitting unsupported      Palpation:   Tender over sciatic notch and greater troch on right.    Gait:   Trendelenburg gait on right during stance phase; and antalgic. Decreased stance time on right. Decreased step length. Unsteady with change of directions; slows down for caution.    TUG:  Trial 1:  9 sec Trial: 2 8 sec  Gait Speed:  13 ft in 3.3 sec    Balance:  Balance Comment: 4 stage balance:  2/4 (Unable to do tandem or SLS)    Activity tolerance:  Standing limited to 5-10 min.  Leans over sink and rests on forearms when doing dishes.    Stairs:  Stairs Comment: Step to pattern up/down; 1 rail and 1 hand on wall to steady.     Outcome Measures:    LEFS 35/80    OP EDUCATION:  Outpatient Education  Individual(s) Educated: Patient  Education Provided: Body Mechanics, Fall Risk, POC  Risk and Benefits Discussed with Patient/Caregiver/Other: yes  Patient/Caregiver Demonstrated Understanding: yes  Plan of Care Discussed and Agreed Upon: yes  Patient Response to Education: Patient/Caregiver Verbalized Understanding of Information, Patient/Caregiver Performed Return Demonstration of Exercises/Activities, Patient/Caregiver Asked Appropriate Questions    Goals:  Patient Goal: To have better balance, have decreased pain in mid back / right hip; neck and shoulders.    Physical Therapy Goals: (established 2/5/25 and to be modified once neck/shoulders are evaluation)    For Balance/ Lower Quarter:  Pain: Will be independent with symptom management strategies and report mid to lower back and right hip pain no worse than 4-5/10 with sleeping,  "standing, walking.  Strength: Improved lower quarter strength to 4+/5 throughout; and at least 4/5 in gastroc/ soleus complex; improved abdominal/ trunk strength and endurance for improved balance reactions and improved activity tolerance.  Balance: Test Morrison Balance and obtain \"low fall risk\" score for improved safety with daily activities/ walking;  4 Stage Balance test improved to at least 3/4; demonstrating improved ability to change directions; navigate obstacles without LOB.  Gait: Will ambulate with SPC as needed for improved steadying when out of the house or walking longer distances.  Will demonstrate improved gait mechanics, minimal trendelenburg pattern, increased stance time on right and safety with changing directions for decreased fall risk when walking.  Function: Improved score on LEFS to at least 45/80; reporting improved sleeping tolerance, able to stand/ walk greater than 15 minutes using upright posture with pain no worse than 4-5/10 for improved daily function. Independent with HEP for carryover of PT and ongoing self management.  "

## 2025-02-18 ENCOUNTER — TELEPHONE (OUTPATIENT)
Dept: PRIMARY CARE | Facility: CLINIC | Age: 80
End: 2025-02-18
Payer: MEDICARE

## 2025-02-18 DIAGNOSIS — E06.3 HYPOTHYROIDISM DUE TO HASHIMOTO'S THYROIDITIS: ICD-10-CM

## 2025-02-18 NOTE — TELEPHONE ENCOUNTER
Patient requesting generic synthroid  Levothyroxine 112 mcg 1 daily  Because it is free  [facility]

## 2025-02-19 ENCOUNTER — TELEPHONE (OUTPATIENT)
Dept: PRIMARY CARE | Facility: CLINIC | Age: 80
End: 2025-02-19

## 2025-02-19 ENCOUNTER — TREATMENT (OUTPATIENT)
Dept: PHYSICAL THERAPY | Facility: CLINIC | Age: 80
End: 2025-02-19
Payer: MEDICARE

## 2025-02-19 DIAGNOSIS — M54.2 NECK PAIN, CHRONIC: ICD-10-CM

## 2025-02-19 DIAGNOSIS — G89.29 NECK PAIN, CHRONIC: ICD-10-CM

## 2025-02-19 DIAGNOSIS — R53.1 GENERALIZED WEAKNESS: Primary | ICD-10-CM

## 2025-02-19 DIAGNOSIS — R26.89 BALANCE PROBLEM: ICD-10-CM

## 2025-02-19 DIAGNOSIS — R26.81 GAIT INSTABILITY: ICD-10-CM

## 2025-02-19 PROCEDURE — 97110 THERAPEUTIC EXERCISES: CPT | Mod: GP

## 2025-02-19 PROCEDURE — 97530 THERAPEUTIC ACTIVITIES: CPT | Mod: GP

## 2025-02-19 ASSESSMENT — PAIN SCALES - GENERAL
PAINLEVEL_OUTOF10: 4
PAINLEVEL_OUTOF10: 2
PAINLEVEL_OUTOF10: 0 - NO PAIN

## 2025-02-19 ASSESSMENT — PAIN - FUNCTIONAL ASSESSMENT: PAIN_FUNCTIONAL_ASSESSMENT: 0-10

## 2025-02-19 NOTE — TELEPHONE ENCOUNTER
Patient called in wanting a referral for a reaction claire knee brace from [facility] in [place]  Can we fax it to  Attn: Jean

## 2025-02-19 NOTE — PROGRESS NOTES
Physical Therapy    Physical Therapy Treatment    Patient Name: Jie Crocker  MRN: [id number]  Today's Date: 2/19/2025    Time Entry:   Time Calculation  Start Time: 0850  Stop Time: 0930  Time Calculation (min): 40 min     PT Therapeutic Procedures Time Entry  Therapeutic Exercise Time Entry: 10  Therapeutic Activity Time Entry: 30                   Assessment:   Today's primary focus was to assess neck/ shoulder pain and limitations to address referral from Dr Glasgow. See objective data and goals established for upper quarter in addition to those established for lower quarter/ balance on evaluation.  Jie was instructed on left upper trap and lev scap stretches today.  We will progress formal treatment for lower and upper quarter deficits next visit. She was encouraged to use a walker to minimize her right hip pain and limp for now when walking. She agreed to do so.    Plan:    PT 1-2 times/ week to address neck /shoulder ROM; upper quarter strength; hip/lower quarter strengthening, balance training; and gait training with use of FWW vs cane for decreased pain with walking; decreased fall risk.     Current Problem  1. Generalized weakness  Follow Up In Physical Therapy      2. Neck pain, chronic  Follow Up In Physical Therapy      3. Balance problem  Follow Up In Physical Therapy      4. Gait instability  Follow Up In Physical Therapy          General     General  General Comment: Hurting a lot today. Almost cancelled due to lack of sleep. Right hip has been really hurting; even with getting up out of a car or standing/walking.  Went to a quilting retreat last week; did okay; but mid back is sore when sewing, but this is normal for her. Her left knee is very sore and she is looking into a new knee brace.  Her left shoulder is still very painful and neck is very stiff.  Keeps her walker handy in case she needs it at home, but doesn't use it out of the house.    Subjective    Precautions  Precautions  STEADI Fall  Risk Score (The score of 4 or more indicates an increased risk of falling): 6  Precautions Comment: Fall risk     Pain  Pain Assessment  Pain Assessment: 0-10  0-10 (Numeric) Pain Score: 4  Pain Type: Chronic pain  Pain Location: Neck  Pain Descriptors:  (stiffness)  Pain Frequency: Constant/continuous  Pain 2  Pain Score 2: 0 - No pain (20/10 shoulder pain with movement)  Pain Type 2: Chronic pain  Pain Location 2: Shoulder  Pain Orientation 2: Left  Pain 3  Pain Score 3: 2 (10/10)  Pain Type 3: Chronic pain  Pain Location 3: Back  Pain Orientation 3: Lower  Pain Radiating Towards 3: right hip    Objective    Trendelenburg gait pattern on right.    Recheck to assess upper quarter (30 min)  Focused on upper quarter eval based on referral from Dr Myah Acevedo Dec. 1st at [employer] and head hit the pew. She had bruising of her face and has continued neck and shoulder pain.   Forward head, rounded shoulders, kyphotic through thoracic spine    Cervical AROM:  Flexion: 65 deg and pulls on left side  Extension: 38 deg and pulls on left side  Right rotation: 70 deg, pulls on left  Left rotation: 50 deg, pain on left   Right lat flexion: 25 deg  Left lat flexion: 10 deg    Denies radicular symptoms, and left UE pain not triggered by neck ROM    Shoulder AROM:  Flexion:  160 deg  left /  150 deg right (prior RTC injury)  Scaption: 85 deg left (painful)/ 120 deg left   (both are painful on return)  IR:  to lower thoracic bilaterally  ER: can reach to base of head bilaterally      Upper extremity strength:   Upper Extremity Strength Right Left   Shoulder flexion 4 4   Shoulder abduction 4 3+   Shoulder External Rotators 4- 4-   Shoulder Internal Rotators 4 4-   Biceps 4+ 4+   Triceps 4 4   Middle Trap 4 4   Lower Trap 4 4   Upper Trap 4 4   Lats 4 4               There ex: 10 min  Upper trap stretches 3 x 20 sec to right  Lev scap stretches 3 x 20 sec to right  Discussed use of FWW for decreased right hip pain and decreased limp  "when walking.     Access Code: [email or web address]  URL: [email or web address]  Date: 02/19/2025  Prepared by: Kaitlynn Brewer    Exercises  - Seated Gentle Upper Trapezius Stretch (Mirrored)  - 2-3 x daily - 7 x weekly - 3 reps - 20 sec hold  - Gentle Levator Scapulae Stretch  - 2-3 x daily - 7 x weekly - 3 reps - 20 sec hold      Goals:  Patient Goal: To have better balance, have decreased pain in mid back / right hip; neck and shoulders.    Physical Therapy Goals: (established 2/5/25 and to be modified once neck/shoulders are evaluation)    For Balance/ Lower Quarter:  Pain: Will be independent with symptom management strategies and report mid to lower back and right hip pain no worse than 4-5/10 with sleeping, standing, walking.  Strength: Improved lower quarter strength to 4+/5 throughout; and at least 4/5 in gastroc/ soleus complex; improved abdominal/ trunk strength and endurance for improved balance reactions and improved activity tolerance.  Balance: Test Morrison Balance and obtain \"low fall risk\" score for improved safety with daily activities/ walking;  4 Stage Balance test improved to at least 3/4; demonstrating improved ability to change directions; navigate obstacles without LOB.  Gait: Will ambulate with SPC as needed for improved steadying when out of the house or walking longer distances.  Will demonstrate improved gait mechanics, minimal trendelenburg pattern, increased stance time on right and safety with changing directions for decreased fall risk when walking.  Function: Improved score on LEFS to at least 45/80; reporting improved sleeping tolerance, able to stand/ walk greater than 15 minutes using upright posture with pain no worse than 4-5/10 for improved daily function. Independent with [facility] for carryover of PT and ongoing self management.  Goal established 2/19/25 for neck/shoulder pain:  Pain: Will be independent with symptom management strategies and report neck and left shoulder " pain pain no worse than 4/10 with daily activities.  ROM: improved painfree neck ROM to at least 70 deg left rotation, 25 deg left lat flexion;  left shoulder painfree ROM to at least 150 deg flexion, 130 deg scaption for improved daily function.  Strength: UE strength at least 4+/5 throughout and independent with HEP for ongoing strengthening for improved performance with ADLs, IADLs, and recreational pursuits (quilting/ sewing).

## 2025-02-20 DIAGNOSIS — G89.29 CHRONIC PAIN OF LEFT KNEE: ICD-10-CM

## 2025-02-20 DIAGNOSIS — R26.81 GAIT INSTABILITY: ICD-10-CM

## 2025-02-20 DIAGNOSIS — M25.562 CHRONIC PAIN OF LEFT KNEE: ICD-10-CM

## 2025-02-21 LAB
TSH SERPL-ACNC: 0.86 MIU/L (ref 0.4–4.5)
VIT B12 SERPL-MCNC: 365 PG/ML (ref 200–1100)

## 2025-02-21 NOTE — TELEPHONE ENCOUNTER
Patient called and said the diagnostic code was  insufficient  so the insurance company denied needs to change    [Negative] : Psychiatric [Dyspareunia: Grade 0] : Dyspareunia: Grade 0 [Constipation: Grade 0] : Constipation: Grade 0 [Diarrhea: Grade 0] : Diarrhea: Grade 0 [Fatigue: Grade 0] : Fatigue: Grade 0 [Urinary Incontinence: Grade 0] : Urinary Incontinence: Grade 0  [Urinary Retention: Grade 0] : Urinary Retention: Grade 0 [Urinary Tract Pain: Grade 0] : Urinary Tract Pain: Grade 0 [Urinary Urgency: Grade 0] : Urinary Urgency: Grade 0 [Urinary Frequency: Grade 0] : Urinary Frequency: Grade 0

## 2025-02-25 ENCOUNTER — TREATMENT (OUTPATIENT)
Dept: PHYSICAL THERAPY | Facility: CLINIC | Age: 80
End: 2025-02-25
Payer: MEDICARE

## 2025-02-25 DIAGNOSIS — G89.29 NECK PAIN, CHRONIC: ICD-10-CM

## 2025-02-25 DIAGNOSIS — M54.2 NECK PAIN, CHRONIC: ICD-10-CM

## 2025-02-25 DIAGNOSIS — R26.89 BALANCE PROBLEM: ICD-10-CM

## 2025-02-25 DIAGNOSIS — R53.1 GENERALIZED WEAKNESS: Primary | ICD-10-CM

## 2025-02-25 DIAGNOSIS — R26.81 GAIT INSTABILITY: ICD-10-CM

## 2025-02-25 PROCEDURE — 97110 THERAPEUTIC EXERCISES: CPT | Mod: GP,CQ

## 2025-02-25 ASSESSMENT — PAIN SCALES - GENERAL
PAINLEVEL_OUTOF10: 2
PAINLEVEL_OUTOF10: 2

## 2025-02-25 ASSESSMENT — PAIN - FUNCTIONAL ASSESSMENT: PAIN_FUNCTIONAL_ASSESSMENT: 0-10

## 2025-02-25 NOTE — PROGRESS NOTES
Physical Therapy    Physical Therapy Treatment    Patient Name: Jie Crocker  MRN: [id number]  Today's Date: 2/25/2025    Time Entry:   Time Calculation  Start Time: 1015  Stop Time: 1100  Time Calculation (min): 45 min     PT Therapeutic Procedures Time Entry  Therapeutic Exercise Time Entry: 45                   Assessment:   Cueing needed to correct her technique with cervical stretches. Added Tband shoulder exercises to HEP.   No increase in back, hip,neck or shoulder pain at the end of her session. Pt maintain balance with modified tandem on the floor R/L  for 30 seconds each.      Plan:   Assess tolerance of today's visit.    Current Problem  1. Generalized weakness  Follow Up In Physical Therapy      2. Neck pain, chronic  Follow Up In Physical Therapy      3. Balance problem  Follow Up In Physical Therapy      4. Gait instability  Follow Up In Physical Therapy            Subjective    Pt reports that neck, shoulder, back and hip pain increase with activity. She is not sleeping well secondary to neck and shoulder pain.  Pt will be getting a brace for the left knee once insurance approves.    Precautions   Precautions  STEADI Fall Risk Score (The score of 4 or more indicates an increased risk of falling): 6  Precautions Comment: Fall risk     Pain  Pain Assessment  Pain Assessment: 0-10  0-10 (Numeric) Pain Score:  (2-3)  Pain Type: Chronic pain  Pain Location: Neck  Pain Frequency: Constant/continuous  Pain 2  Pain Score 2: 2  Pain Type 2: Chronic pain  Pain Location 2: Shoulder  Pain Orientation 2: Left  Pain 3  Pain Score 3: 2  Pain Type 3: Chronic pain  Pain Location 3: Back  Pain Orientation 3: Lower  Pain Radiating Towards 3: right hip    Objective   Initiated flow sheet  Reviewed cervical stretches    Treatments:  EXERCISES Date 2/25/25 Date Date Date    REPS 3 REPS REPS REPS          Nustep L4 10 min                    Shuttle  DLP 4B 30x      Shuttle SLP 3B 3x10 ea                    Qhip Flexion 20#  "2x10 ea      Qhip Extension       Qhip Abduction 20# 2x10 ea                    Q Quad       Q Hamstring               T-band extension Blue 20x      T-band ski  Blue 20x      T-band rows Blue 20x      T-band IR/ER Red 2x10 ea                    NBOS on foam EO       Modified tandem balance 30\"x1 R/L                           UT/levator stretches  (right only) 30\"Hx3 ea                  OP EDUCATION:   HEP  Shoulder extension with resistance - 10 reps - 3 sets - 1X daily  Shoulder retractions with resistance - 10 reps - 3 sets - 1X daily  Standing shoulder internal rotation with resistance - 10 reps - 3 sets - 1X daily  Standing shoulder external rotation with resistance - 10 reps - 3 sets - 1X daily  Standing romberg 3/4 tandem - 30 seconds - 2x daily          Goals:  Pain: Will be independent with symptom management strategies and report mid to lower back and right hip pain no worse than 4-5/10 with sleeping, standing, walking.  Strength: Improved lower quarter strength to 4+/5 throughout; and at least 4/5 in gastroc/ soleus complex; improved abdominal/ trunk strength and endurance for improved balance reactions and improved activity tolerance.  Balance: Test Morrison Balance and obtain \"low fall risk\" score for improved safety with daily activities/ walking;  4 Stage Balance test improved to at least 3/4; demonstrating improved ability to change directions; navigate obstacles without LOB.  Gait: Will ambulate with SPC as needed for improved steadying when out of the house or walking longer distances.  Will demonstrate improved gait mechanics, minimal trendelenburg pattern, increased stance time on right and safety with changing directions for decreased fall risk when walking.  Function: Improved score on LEFS to at least 45/80; reporting improved sleeping tolerance, able to stand/ walk greater than 15 minutes using upright posture with pain no worse than 4-5/10 for improved daily function. Independent with HEP for " carryover of PT and ongoing self management.  Goal established 2/19/25 for neck/shoulder pain:  Pain: Will be independent with symptom management strategies and report neck and left shoulder pain pain no worse than 4/10 with daily activities.  ROM: improved painfree neck ROM to at least 70 deg left rotation, 25 deg left lat flexion;  left shoulder painfree ROM to at least 150 deg flexion, 130 deg scaption for improved daily function.  Strength: UE strength at least 4+/5 throughout and independent with HEP for ongoing strengthening for improved performance with ADLs, IADLs, and recreational pursuits (quilting/ sewing).

## 2025-02-27 ENCOUNTER — APPOINTMENT (OUTPATIENT)
Dept: PRIMARY CARE | Facility: CLINIC | Age: 80
End: 2025-02-27
Payer: MEDICARE

## 2025-02-27 DIAGNOSIS — E53.8 VITAMIN B12 DEFICIENCY: ICD-10-CM

## 2025-02-27 PROCEDURE — 96372 THER/PROPH/DIAG INJ SC/IM: CPT | Performed by: INTERNAL MEDICINE

## 2025-02-27 RX ORDER — CYANOCOBALAMIN 1000 UG/ML
1000 INJECTION, SOLUTION INTRAMUSCULAR; SUBCUTANEOUS ONCE
Status: COMPLETED | OUTPATIENT
Start: 2025-02-27 | End: 2025-02-27

## 2025-02-27 RX ADMIN — CYANOCOBALAMIN 1000 MCG: 1000 INJECTION, SOLUTION INTRAMUSCULAR; SUBCUTANEOUS at 10:23

## 2025-02-27 NOTE — PROGRESS NOTES
Subjective   Patient ID: Jei Crocker is a 80 y.o. female who presents for Nurse Visit (B12 injection).    HPI     Review of Systems    Objective   There were no vitals taken for this visit.    Physical Exam    Assessment/Plan

## 2025-03-04 ENCOUNTER — TREATMENT (OUTPATIENT)
Dept: PHYSICAL THERAPY | Facility: CLINIC | Age: 80
End: 2025-03-04
Payer: MEDICARE

## 2025-03-04 DIAGNOSIS — R26.89 BALANCE PROBLEM: ICD-10-CM

## 2025-03-04 DIAGNOSIS — R26.81 GAIT INSTABILITY: ICD-10-CM

## 2025-03-04 DIAGNOSIS — G89.29 NECK PAIN, CHRONIC: ICD-10-CM

## 2025-03-04 DIAGNOSIS — M54.2 NECK PAIN, CHRONIC: ICD-10-CM

## 2025-03-04 DIAGNOSIS — R53.1 GENERALIZED WEAKNESS: Primary | ICD-10-CM

## 2025-03-04 PROCEDURE — 97110 THERAPEUTIC EXERCISES: CPT | Mod: GP

## 2025-03-04 ASSESSMENT — PAIN SCALES - GENERAL
PAINLEVEL_OUTOF10: 2
PAINLEVEL_OUTOF10: 2
PAINLEVEL_OUTOF10: 0 - NO PAIN

## 2025-03-04 ASSESSMENT — PAIN - FUNCTIONAL ASSESSMENT: PAIN_FUNCTIONAL_ASSESSMENT: 0-10

## 2025-03-04 NOTE — PROGRESS NOTES
Physical Therapy    Physical Therapy Treatment    Patient Name: Jie Crocker  MRN: [id number]  Today's Date: 3/4/2025    Time Entry:   Time Calculation  Start Time: 1040  Stop Time: 1130  Time Calculation (min): 50 min     PT Therapeutic Procedures Time Entry  Therapeutic Exercise Time Entry: 50                   Assessment:   Through discussion Jie states she is trying to her steps reciprocally even though is really hurts her right knee.  We discussed going back to step to pattern due to her right knee and hip pain/ weakness; and not try reciprocal stairs until she is stronger.   Backed down resistance on shuttle due to right medial knee discomfort. Otherwise, tolerated ex well; challenged by stance on right leg due to hip weakness. Slightly improved gait noted today; possibly attributed to better footwear/ arch support. Added neck stretches to both sides today; no ipsilateral pain/ pinching felt with this.     Plan:    Continue to monitor response to ex for no increased pain.  Monitor and update HEP. Continue to work to strengthen lower quarter/ upper quarter mm; work on neck ROM, flexibility, improved posture.     Current Problem  1. Generalized weakness  Follow Up In Physical Therapy      2. Neck pain, chronic  Follow Up In Physical Therapy      3. Balance problem  Follow Up In Physical Therapy      4. Gait instability  Follow Up In Physical Therapy          General     General  General Comment: Back and neck  aren't too bad but it is early. Tried a knee brace from [facility], but it didn't help.  Is goind to [facility] to see what they can recommend for her left knee. Has started wearing her HOKA sneakers again and that seems to be supporting her feet and helping her knee and hip a bit.    Subjective    Precautions  Precautions  STEADI Fall Risk Score (The score of 4 or more indicates an increased risk of falling): 6  Precautions Comment: Fall risk  Pain  Pain Assessment  Pain Assessment: 0-10  0-10  "(Numeric) Pain Score: 0 - No pain (no pain at rest;  6-7/10 with movement)  Pain Type: Chronic pain  Pain Location: Neck  Pain Orientation: Left  Pain 2  Pain Score 2: 2  Pain Type 2: Chronic pain  Pain Location 2: Shoulder  Pain Orientation 2: Left  Pain 3  Pain Score 3: 2  Pain Type 3: Chronic pain  Pain Location 3: Back  Pain Orientation 3: Lower  Pain Radiating Towards 3: right hip    Objective     Treatments:  EXERCISES Date 2/25/25 Date 3/4/35 Date Date    Visit 3 Visit 4            Nustep L4 10 min L4 x 10 min                   Shuttle  DLP 4B 30x 4B 3 x12     Shuttle SLP 3B 3x10 ea 2B  4 x 5 R/L                   Qhip Flexion 20# 2x10 ea 20# 2 x 10 R/L     Qhip Extension       Qhip Abduction 20# 2x10 ea 20# 2 x 10 R/L                   Q Quad       Q Hamstring               T-band extension Blue 20x Blue 2 x 10     T-band ski  Blue 20x      T-band rows Blue 20x Blue 2 x 10     T-band IR/ER Red 2x10 ea Red 2 x 10 ea                   NBOS on foam EO       Modified tandem balance 30\"x1 R/L             Rockerboard:  Heel/toe taps  A/P balance  Lat balance    X20  X 1 min (FTT)            UT/levator stretches  (right only) 30\"Hx3 ea 3 x 30 sec R/L ea today                 OP EDUCATION:   HEP  Shoulder extension with resistance - 10 reps - 3 sets - 1X daily  Shoulder retractions with resistance - 10 reps - 3 sets - 1X daily  Standing shoulder internal rotation with resistance - 10 reps - 3 sets - 1X daily  Standing shoulder external rotation with resistance - 10 reps - 3 sets - 1X daily  Standing romberg 3/4 tandem - 30 seconds - 2x daily      Goals:  Pain: Will be independent with symptom management strategies and report mid to lower back and right hip pain no worse than 4-5/10 with sleeping, standing, walking.  Strength: Improved lower quarter strength to 4+/5 throughout; and at least 4/5 in gastroc/ soleus complex; improved abdominal/ trunk strength and endurance for improved balance reactions and improved " "activity tolerance.  Balance: Test Morrison Balance and obtain \"low fall risk\" score for improved safety with daily activities/ walking;  4 Stage Balance test improved to at least 3/4; demonstrating improved ability to change directions; navigate obstacles without LOB.  Gait: Will ambulate with SPC as needed for improved steadying when out of the house or walking longer distances.  Will demonstrate improved gait mechanics, minimal trendelenburg pattern, increased stance time on right and safety with changing directions for decreased fall risk when walking.  Function: Improved score on LEFS to at least 45/80; reporting improved sleeping tolerance, able to stand/ walk greater than 15 minutes using upright posture with pain no worse than 4-5/10 for improved daily function. Independent with HEP for carryover of PT and ongoing self management.  Goal established 2/19/25 for neck/shoulder pain:  Pain: Will be independent with symptom management strategies and report neck and left shoulder pain pain no worse than 4/10 with daily activities.  ROM: improved painfree neck ROM to at least 70 deg left rotation, 25 deg left lat flexion;  left shoulder painfree ROM to at least 150 deg flexion, 130 deg scaption for improved daily function.  Strength: UE strength at least 4+/5 throughout and independent with HEP for ongoing strengthening for improved performance with ADLs, IADLs, and recreational pursuits (quilting/ sewing).  "

## 2025-03-11 ENCOUNTER — TREATMENT (OUTPATIENT)
Dept: PHYSICAL THERAPY | Facility: CLINIC | Age: 80
End: 2025-03-11
Payer: MEDICARE

## 2025-03-11 DIAGNOSIS — G89.29 NECK PAIN, CHRONIC: ICD-10-CM

## 2025-03-11 DIAGNOSIS — R53.1 GENERALIZED WEAKNESS: Primary | ICD-10-CM

## 2025-03-11 DIAGNOSIS — R26.81 GAIT INSTABILITY: ICD-10-CM

## 2025-03-11 DIAGNOSIS — R26.89 BALANCE PROBLEM: ICD-10-CM

## 2025-03-11 DIAGNOSIS — M54.2 NECK PAIN, CHRONIC: ICD-10-CM

## 2025-03-11 PROCEDURE — 97140 MANUAL THERAPY 1/> REGIONS: CPT | Mod: GP

## 2025-03-11 PROCEDURE — 97110 THERAPEUTIC EXERCISES: CPT | Mod: GP

## 2025-03-11 ASSESSMENT — PAIN - FUNCTIONAL ASSESSMENT: PAIN_FUNCTIONAL_ASSESSMENT: 0-10

## 2025-03-11 ASSESSMENT — PAIN SCALES - GENERAL
PAINLEVEL_OUTOF10: 2
PAINLEVEL_OUTOF10: 0 - NO PAIN
PAINLEVEL_OUTOF10: 9

## 2025-03-11 NOTE — PROGRESS NOTES
Physical Therapy    Physical Therapy Treatment    Patient Name: Jie Crocker  MRN: [id number]  Today's Date: 3/11/2025    Time Entry:   Time Calculation  Start Time: 1050  Stop Time: 1128  Time Calculation (min): 38 min     PT Therapeutic Procedures Time Entry  Manual Therapy Time Entry: 25  Therapeutic Exercise Time Entry: 10                   Assessment:   Focused on increased neck pain today and addressed soft tissue restrictions with manual techniques, initiated manual cervical distraction. Jie reported decreased pain after today's treatment. Reviewed HEP; advised to continue stretches as long as they do not increase her pain.     Plan:   Recheck next visit. Assess response to today's treatment for her neck.  Consider teaching self SOR and TP release with tennis balls next session.     Current Problem  1. Generalized weakness  Follow Up In Physical Therapy      2. Neck pain, chronic  Follow Up In Physical Therapy      3. Balance problem  Follow Up In Physical Therapy      4. Gait instability  Follow Up In Physical Therapy          General     General  General Comment: Stiff sore all over.  Had a sharp pain in left side of her neck last night and it work her with sharp pain shooting to left side of her head.  Took 3 advil this am; it is still sore. Using walker in the house down [address]. Still has to take breaks when making dinner due to increased mid back pain.    Subjective    Precautions  Precautions  STEADI Fall Risk Score (The score of 4 or more indicates an increased risk of falling): 6  Precautions Comment: Fall risk  Pain  Pain Assessment  Pain Assessment: 0-10  0-10 (Numeric) Pain Score: 9  Pain Type: Chronic pain  Pain Location: Neck  Pain Orientation: Left  Pain Radiating Towards: left eye  Pain 2  Pain Score 2: 2  Pain Type 2: Chronic pain  Pain Location 2: Shoulder  Pain Orientation 2: Left  Pain 3  Pain Score 3: 0 - No pain  Pain Type 3: Chronic pain  Pain Location 3: Back  Pain Orientation 3:  "Lower  Pain Radiating Towards 3: right hip    Objective    Ambulating with trendelenburg pattern.   Tender over left upper trap, lev scap, suboccipital mm and trigger points noted- improved with manual techniques today.     Treatments:  EXERCISES Date 2/25/25 Date 3/4/35 Date 3/11/25 Date    Visit 3 Visit 4 Visit 5           Nustep L4 10 min L4 x 10 min L3 x 10 min           Manual Therapy to neck:  Seated STM to left upper trap, cervical paraspinals, TP release.  Supine cervical paraspinal, SOR, STM to suboccipitals.  Manual Cervical Distraction:     25 min total time    Shuttle  DLP 4B 30x 4B 3 x12     Shuttle SLP 3B 3x10 ea 2B  4 x 5 R/L                   Qhip Flexion 20# 2x10 ea 20# 2 x 10 R/L     Qhip Extension       Qhip Abduction 20# 2x10 ea 20# 2 x 10 R/L                   Q Quad       Q Hamstring               T-band extension Blue 20x Blue 2 x 10     T-band ski  Blue 20x      T-band rows Blue 20x Blue 2 x 10     T-band IR/ER Red 2x10 ea Red 2 x 10 ea                   NBOS on foam EO       Modified tandem balance 30\"x1 R/L             Rockerboard:  Heel/toe taps  A/P balance  Lat balance    X20  X 1 min (FTT)            UT/levator stretches  (right only) 30\"Hx3 ea 3 x 30 sec R/L ea today reviewed                OP EDUCATION:   HEP  Shoulder extension with resistance - 10 reps - 3 sets - 1X daily  Shoulder retractions with resistance - 10 reps - 3 sets - 1X daily  Standing shoulder internal rotation with resistance - 10 reps - 3 sets - 1X daily  Standing shoulder external rotation with resistance - 10 reps - 3 sets - 1X daily  Standing romberg 3/4 tandem - 30 seconds - 2x daily    Patient Goal: To have better balance, have decreased pain in mid back / right hip; neck and shoulders  Physical Therapy Goals:  Pain: Will be independent with symptom management strategies and report mid to lower back and right hip pain no worse than 4-5/10 with sleeping, standing, walking.  Strength: Improved lower quarter " "strength to 4+/5 throughout; and at least 4/5 in gastroc/ soleus complex; improved abdominal/ trunk strength and endurance for improved balance reactions and improved activity tolerance.  Balance: Test Morrison Balance and obtain \"low fall risk\" score for improved safety with daily activities/ walking;  4 Stage Balance test improved to at least 3/4; demonstrating improved ability to change directions; navigate obstacles without LOB.  Gait: Will ambulate with SPC as needed for improved steadying when out of the house or walking longer distances.  Will demonstrate improved gait mechanics, minimal trendelenburg pattern, increased stance time on right and safety with changing directions for decreased fall risk when walking.  Function: Improved score on LEFS to at least 45/80; reporting improved sleeping tolerance, able to stand/ walk greater than 15 minutes using upright posture with pain no worse than 4-5/10 for improved daily function. Independent with HEP for carryover of PT and ongoing self management.  Goal established 2/19/25 for neck/shoulder pain:  Pain: Will be independent with symptom management strategies and report neck and left shoulder pain pain no worse than 4/10 with daily activities.  ROM: improved painfree neck ROM to at least 70 deg left rotation, 25 deg left lat flexion;  left shoulder painfree ROM to at least 150 deg flexion, 130 deg scaption for improved daily function.  Strength: UE strength at least 4+/5 throughout and independent with HEP for ongoing strengthening for improved performance with ADLs, IADLs, and recreational pursuits (quilting/ sewing).  "

## 2025-03-20 ENCOUNTER — TELEPHONE (OUTPATIENT)
Dept: PRIMARY CARE | Facility: CLINIC | Age: 80
End: 2025-03-20
Payer: MEDICARE

## 2025-03-20 ENCOUNTER — TREATMENT (OUTPATIENT)
Dept: PHYSICAL THERAPY | Facility: CLINIC | Age: 80
End: 2025-03-20
Payer: MEDICARE

## 2025-03-20 DIAGNOSIS — R26.81 GAIT INSTABILITY: ICD-10-CM

## 2025-03-20 DIAGNOSIS — E06.3 HYPOTHYROIDISM DUE TO HASHIMOTO'S THYROIDITIS: ICD-10-CM

## 2025-03-20 DIAGNOSIS — G89.29 NECK PAIN, CHRONIC: ICD-10-CM

## 2025-03-20 DIAGNOSIS — R26.89 BALANCE PROBLEM: ICD-10-CM

## 2025-03-20 DIAGNOSIS — M54.2 NECK PAIN, CHRONIC: ICD-10-CM

## 2025-03-20 DIAGNOSIS — R53.1 GENERALIZED WEAKNESS: Primary | ICD-10-CM

## 2025-03-20 PROCEDURE — 97140 MANUAL THERAPY 1/> REGIONS: CPT | Mod: GP

## 2025-03-20 PROCEDURE — 97110 THERAPEUTIC EXERCISES: CPT | Mod: GP

## 2025-03-20 RX ORDER — LEVOTHYROXINE SODIUM 100 UG/1
100 TABLET ORAL DAILY
Qty: 30 TABLET | Refills: 11 | Status: SHIPPED | OUTPATIENT
Start: 2025-03-20 | End: 2026-03-20

## 2025-03-20 ASSESSMENT — PAIN SCALES - GENERAL
PAINLEVEL_OUTOF10: 2
PAINLEVEL_OUTOF10: 0 - NO PAIN
PAINLEVEL_OUTOF10: 10 - WORST POSSIBLE PAIN

## 2025-03-20 ASSESSMENT — PAIN - FUNCTIONAL ASSESSMENT: PAIN_FUNCTIONAL_ASSESSMENT: 0-10

## 2025-03-20 NOTE — TELEPHONE ENCOUNTER
Per ;ast office visit it was changed   Patient with fluctuations in thyroid medication on levothyroxine mildly overtreated we will reduce and change over to d.a.w. Synthroid 100 mcg daily and reevaluate with follow-up blood work         Med Refill  levothyroxine (Synthroid, Levoxyl) 112 mcg tablet [[id number]]  DISCONTINUED         [facility] #[id number] - [facility] [address]  AT [facility] [address]  [address] [address]  Phone: [phone]  Fax: [phone]  VENITA #: [id number]

## 2025-03-20 NOTE — TELEPHONE ENCOUNTER
Med Refill  levothyroxine (Synthroid, Levoxyl) 112 mcg tablet [[id number]]  DISCONTINUED       [facility] #[id number] - [facility] [address] AT [facility] [address]  [address] [address], [facility] [address]  Phone: [phone]  Fax: [phone]  VENIAT #: [id number]

## 2025-03-20 NOTE — PROGRESS NOTES
Physical Therapy    Physical Therapy Treatment/ Recheck    Patient Name: Jie Crocker  MRN: [id number]  Today's Date: 3/20/2025    Time Entry:   Time Calculation  Start Time: 1200  Stop Time: 1245  Time Calculation (min): 45 min     PT Therapeutic Procedures Time Entry  Manual Therapy Time Entry: 15  Therapeutic Exercise Time Entry: 30                   Assessment:   Jie has attended 6 visits to address her back pain/ LE weakness; neck and shoulder pain and limited motion/ strength. She has not made much progress at this time and attendance has been intermittent.  She is seeing her chiropractor as well.  She will benefit from skilled PT to work to address her deficits; address soft tissue restrictions in upper quarter region; work to improve strength of upper and lower quarter mm; trunk mm; and postural mm to see if she can achieve decreased pain and improved tolerance for daily activities and her quilting activities.     Plan:    Continue skilled PT 1-2 times/ week to work to address neck and lower quarter deficits to try to achieve consistent pain relief, improved neck/ shoulder ROM; improved strength and improved function. PT to include manual therapy, there ex, there activity.     Current Problem  1. Generalized weakness  Follow Up In Physical Therapy      2. Neck pain, chronic  Follow Up In Physical Therapy      3. Balance problem  Follow Up In Physical Therapy      4. Gait instability  Follow Up In Physical Therapy          General     General  General Comment: Has had a couple bad nights. Having some left tooth pain that is being addressed. Saw the chiropractor yesterday and he worked on her neck and low back, which helped some.  However, she was getting more muscle cramps in her legs last night. The neck and shoulder pain wake her up. Her low back is in spasms at times.  The soft tissue massage in PT for her neck helped last time. Neck distraction helps the neck pain, too.  Is frustrated because she is  "afraid she will not be able to tolerate the sitting and sewing all weekend at her quilt retreat this weekend.    Subjective    Precautions  Precautions  STEADI Fall Risk Score (The score of 4 or more indicates an increased risk of falling): 6  Precautions Comment: fall risk    Pain  Pain Assessment  Pain Assessment: 0-10  0-10 (Numeric) Pain Score: 10 - Worst possible pain  Pain Type: Chronic pain  Pain Location: Neck  Pain Orientation: Left  Pain Radiating Towards: left eye  Pain Frequency: Constant/continuous  Pain 2  Pain Score 2: 2 (10/10 with use; worse with use and with sleeping at night. (sleeps on left side))  Pain Type 2: Chronic pain  Pain Location 2: Shoulder  Pain Orientation 2: Left  Pain 3  Pain Score 3: 0 - No pain (can get up to 10/10 with prolonged standing)  Pain Type 3: Chronic pain  Pain Location 3: Back  Pain Orientation 3: Lower  Pain Radiating Towards 3: right hip    Objective   Ambulating with significant limp on right leg still- not using SPC or FWW as recommended when out of the house; uses it in the house sometimes.  Feels it is too cumbersome to try and get the walker to her car.     Neck AROM:  Neck retraction:  \"feels good\"  Flexion: 60 deg; sore in left cervical paraspinals and upper trap  Ext: 50 deg; P!  R cervical rotation: 60 deg  L cervical rotation: 50 deg    Denies radicular symptoms, and left UE pain not triggered by neck ROM    Shoulder AROM:  Flexion:  160 deg  left  (sore with lowering)/  150 deg right (prior RTC injury)  Scaption: 85 deg left (painful)/ 120 deg left   (both are painful on return)  IR:  to lower thoracic bilaterally  ER: can reach to base of head bilaterally      Upper Extremity Strength Right Left   Shoulder flexion 4 4   Shoulder abduction 4 3+   Shoulder External Rotators 4- 4-   Shoulder Internal Rotators 4 4-   Biceps 4+ 4+   Triceps 4 4   Middle Trap 4 4   Lower Trap 4 4   Upper Trap 4 4   Lats 4 4                 Lower Extremity Strength Right Left " "  Hip Flexors 4-, P! In back 4   Hip Abductors 4 4   Hip Extensors 4 4   Hip Adductors 4+ 4+   Quads 4+ 4+   Hamstrings 4+ 4+   Gastroc/Soleus 3+  DHR only 3+  DHR only   Ankle Dorsiflexors 5 5        30 sec chair rise  Painful/difficulty to do 1 rep without HHA        Abdominals 3+   Trunk extensors 4-;   fatigues with standing and sitting unsupported      No measured leg length difference (35 in each side); level iliac crest heights noted in standing.  Left knee valgus in standing.    Treatments:  EXERCISES Date 2/25/25 Date 3/4/35 Date 3/11/25 Date 3/20/25    Visit 3 Visit 4 Visit 5 Visit 6   Recheck    20 min   Nustep L4 10 min L4 x 10 min L3 x 10 min           Manual Therapy to neck:  Seated STM to left upper trap, cervical paraspinals, TP release.  Supine cervical paraspinal, SOR, STM to suboccipitals.  Manual Cervical Distraction:     25 min total time 15 min total  Seated STM to cervical paraspinals, left upper trap, TP release   Neck retractions    10 x 5 sec                 Single knee to chest stretches    3 x 20 sec   Shuttle  DLP 4B 30x 4B 3 x12     Shuttle SLP 3B 3x10 ea 2B  4 x 5 R/L                   Qhip Flexion 20# 2x10 ea 20# 2 x 10 R/L     Qhip Extension       Qhip Abduction 20# 2x10 ea 20# 2 x 10 R/L                   Q Quad       Q Hamstring               T-band extension Blue 20x Blue 2 x 10     T-band ski  Blue 20x      T-band rows Blue 20x Blue 2 x 10     T-band IR/ER Red 2x10 ea Red 2 x 10 ea                   NBOS on foam EO       Modified tandem balance 30\"x1 R/L             Rockerboard:  Heel/toe taps  A/P balance  Lat balance    X20  X 1 min (FTT)            UT/levator stretches  (right only) 30\"Hx3 ea 3 x 30 sec R/L ea today reviewed           Posture/ Body Mechanics    Review for sitting/ standing/ quilting set up. Discussed pacing her activities/ modifying to minimize pain.   HEP            OP EDUCATION:   HEP  Shoulder extension with resistance - 10 reps - 3 sets - 1X " "daily  Shoulder retractions with resistance - 10 reps - 3 sets - 1X daily  Standing shoulder internal rotation with resistance - 10 reps - 3 sets - 1X daily  Standing shoulder external rotation with resistance - 10 reps - 3 sets - 1X daily  Standing romberg 3/4 tandem - 30 seconds - 2x daily    Goal status as of 3/20/25:  Patient Goal: To have better balance, have decreased pain in mid back / right hip; neck and shoulders- not yet met  Physical Therapy Goals:  Pain: Will be independent with symptom management strategies and report mid to lower back and right hip pain no worse than 4-5/10 with sleeping, standing, walking.- not met  Strength: Improved lower quarter strength to 4+/5 throughout; and at least 4/5 in gastroc/ soleus complex; improved abdominal/ trunk strength and endurance for improved balance reactions and improved activity tolerance.- not met  Balance: Test Morrison Balance and obtain \"low fall risk\" score for improved safety with daily activities/ walking;  4 Stage Balance test improved to at least 3/4; demonstrating improved ability to change directions; navigate obstacles without LOB.- not met  Gait: Will ambulate with SPC as needed for improved steadying when out of the house or walking longer distances.  Will demonstrate improved gait mechanics, minimal trendelenburg pattern, increased stance time on right and safety with changing directions for decreased fall risk when walking.- not met  Function: Improved score on LEFS to at least 45/80; reporting improved sleeping tolerance, able to stand/ walk greater than 15 minutes using upright posture with pain no worse than 4-5/10 for improved daily function. Independent with [facility] for carryover of PT and ongoing self management.  Goal established 2/19/25 for neck/shoulder pain:  Pain: Will be independent with symptom management strategies and report neck and left shoulder pain pain no worse than 4/10 with daily activities.- not met  ROM: improved painfree neck " ROM to at least 70 deg left rotation, 25 deg left lat flexion;  left shoulder painfree ROM to at least 150 deg flexion, 130 deg scaption for improved daily function.- progressing  Strength: UE strength at least 4+/5 throughout and independent with HEP for ongoing strengthening for improved performance with ADLs, IADLs, and recreational pursuits (quilting/ sewing).- not yet met

## 2025-03-21 ENCOUNTER — TELEPHONE (OUTPATIENT)
Dept: PRIMARY CARE | Facility: CLINIC | Age: 80
End: 2025-03-21
Payer: MEDICARE

## 2025-03-26 ENCOUNTER — APPOINTMENT (OUTPATIENT)
Dept: PHYSICAL THERAPY | Facility: CLINIC | Age: 80
End: 2025-03-26
Payer: MEDICARE

## 2025-03-26 ENCOUNTER — DOCUMENTATION (OUTPATIENT)
Dept: PHYSICAL THERAPY | Facility: CLINIC | Age: 80
End: 2025-03-26
Payer: MEDICARE

## 2025-03-26 DIAGNOSIS — R53.1 GENERALIZED WEAKNESS: Primary | ICD-10-CM

## 2025-03-26 DIAGNOSIS — M54.2 NECK PAIN, CHRONIC: ICD-10-CM

## 2025-03-26 DIAGNOSIS — G89.29 NECK PAIN, CHRONIC: ICD-10-CM

## 2025-03-26 DIAGNOSIS — R26.89 BALANCE PROBLEM: ICD-10-CM

## 2025-03-26 NOTE — PROGRESS NOTES
Physical Therapy                 Therapy Communication Note    Patient Name: Jie Crocker  MRN: [id number]  Department:   Room: Room/bed info not found  Today's Date: 3/26/2025     Discipline: Physical Therapy          Missed Visit Reason:      Missed Time: Cancel    Comment: Pt is unable to walk because her heel is painful.

## 2025-03-27 ENCOUNTER — APPOINTMENT (OUTPATIENT)
Dept: PRIMARY CARE | Facility: CLINIC | Age: 80
End: 2025-03-27
Payer: MEDICARE

## 2025-04-02 ENCOUNTER — TREATMENT (OUTPATIENT)
Dept: PHYSICAL THERAPY | Facility: CLINIC | Age: 80
End: 2025-04-02
Payer: MEDICARE

## 2025-04-02 DIAGNOSIS — R53.1 GENERALIZED WEAKNESS: Primary | ICD-10-CM

## 2025-04-02 DIAGNOSIS — R26.81 GAIT INSTABILITY: ICD-10-CM

## 2025-04-02 DIAGNOSIS — R26.89 BALANCE PROBLEM: ICD-10-CM

## 2025-04-02 DIAGNOSIS — M54.2 NECK PAIN, CHRONIC: ICD-10-CM

## 2025-04-02 DIAGNOSIS — G89.29 NECK PAIN, CHRONIC: ICD-10-CM

## 2025-04-02 PROCEDURE — 97140 MANUAL THERAPY 1/> REGIONS: CPT | Mod: GP,CQ

## 2025-04-02 PROCEDURE — 97110 THERAPEUTIC EXERCISES: CPT | Mod: GP,CQ

## 2025-04-02 ASSESSMENT — PAIN SCALES - GENERAL
PAINLEVEL_OUTOF10: 8
PAINLEVEL_OUTOF10: 0 - NO PAIN
PAINLEVEL_OUTOF10: 2

## 2025-04-02 ASSESSMENT — PAIN SCALES - WONG BAKER: WONGBAKER_NUMERICALRESPONSE: HURTS EVEN MORE

## 2025-04-02 ASSESSMENT — PAIN - FUNCTIONAL ASSESSMENT: PAIN_FUNCTIONAL_ASSESSMENT: 0-10

## 2025-04-02 NOTE — PROGRESS NOTES
"Physical Therapy    Physical Therapy Treatment    Patient Name: Jie Crocker  MRN: [id number]  Today's Date: 4/2/2025    Time Entry:   Time Calculation  Start Time: 1015  Stop Time: 1100  Time Calculation (min): 45 min     PT Therapeutic Procedures Time Entry  Manual Therapy Time Entry: 10  Therapeutic Exercise Time Entry: 35                   Assessment:   Pt had decreased left neck pain and tightness after STM. Pt had intermittent right knee and left heel pain throughout her treatment.   Pt maintained good standing posture and abdominal bracing while doing T-band shoulder and DLS exercises.      Plan:    Continue skilled PT 1-2 times/ week to work to address neck and lower quarter deficits to try to achieve consistent pain relief, improved neck/ shoulder ROM; improved strength and improved function. PT to include manual therapy, there ex, there activity.     Current Problem  1. Generalized weakness  Follow Up In Physical Therapy      2. Neck pain, chronic  Follow Up In Physical Therapy      3. Balance problem  Follow Up In Physical Therapy      4. Gait instability  Follow Up In Physical Therapy              Subjective    Pt reports that she has been having sharp pain in her left heel. She thinks she may have neuropathy. Pt will discuss this with her PCP. Pt has not been doing much at home because she has severe neck, shoulder, hip and foot pain with activity.  Pt was unable to come to her last PT visit because she had a bronchitis and asthma \"flare up\".        Precautions   Precautions  STEADI Fall Risk Score (The score of 4 or more indicates an increased risk of falling): 6  Precautions Comment: fall risk      Pain  Pain Assessment  Pain Assessment: 0-10  0-10 (Numeric) Pain Score: 8  Pain Type: Chronic pain  Pain Location: Neck  Pain Orientation: Left  Pain Radiating Towards: left eye  Pain Frequency: Intermittent  Pain 2  Pain Score 2: 2  Pain Type 2: Chronic pain  Pain Location 2: Shoulder  Pain Orientation 2: " Left  Pain 3  Pain Score 3: 0 - No pain  Kyle-Valle FACES Pain Rating 3: Hurts even more  Pain Type 3: Chronic pain  Pain Location 3: Back  Pain Orientation 3: Lower  Pain Radiating Towards 3: right hip    Objective   R cervical rotation: 60 deg  L cervical rotation: 50 deg         Treatments:  EXERCISES Date 4/2/25     Visit 7    Recheck     Nustep L4 10 min         Manual Therapy to neck:  Seated STM to left upper trap, cervical paraspinals, TP release.  Supine cervical paraspinal, SOR, STM to suboccipitals.  Manual Cervical Distraction:  10 min    Neck retractions               Single knee to chest stretches     Shuttle  DLP 4B 30x    Shuttle SLP 3B 3x10 ea              Qhip Flexion 20# 2x10 ea    Qhip Extension     Qhip Abduction 20# 2x10 ea              Q Quad     Q Hamstring           T-band extension Blue 20x    T-band ski  Blue 20x    T-band rows Blue 20x    T-band IR/ER Red 2x10 ea              NBOS on foam EO     Modified tandem balance          Rockerboard:  Heel/toe taps  A/P balance  Lat balance   X20  X 1 min          UT/levator stretches  (right only)          Posture/ Body Mechanics     HEP          OP EDUCATION:   HEP  Shoulder extension with resistance - 10 reps - 3 sets - 1X daily  Shoulder retractions with resistance - 10 reps - 3 sets - 1X daily  Standing shoulder internal rotation with resistance - 10 reps - 3 sets - 1X daily  Standing shoulder external rotation with resistance - 10 reps - 3 sets - 1X daily  Standing romberg 3/4 tandem - 30 seconds - 2x daily    Goal status as of 3/20/25:  Patient Goal: To have better balance, have decreased pain in mid back / right hip; neck and shoulders- not yet met  Physical Therapy Goals:  Pain: Will be independent with symptom management strategies and report mid to lower back and right hip pain no worse than 4-5/10 with sleeping, standing, walking.- not met  Strength: Improved lower quarter strength to 4+/5 throughout; and at least 4/5 in gastroc/ soleus  "complex; improved abdominal/ trunk strength and endurance for improved balance reactions and improved activity tolerance.- not met  Balance: Test Morrison Balance and obtain \"low fall risk\" score for improved safety with daily activities/ walking;  4 Stage Balance test improved to at least 3/4; demonstrating improved ability to change directions; navigate obstacles without LOB.- not met  Gait: Will ambulate with SPC as needed for improved steadying when out of the house or walking longer distances.  Will demonstrate improved gait mechanics, minimal trendelenburg pattern, increased stance time on right and safety with changing directions for decreased fall risk when walking.- not met  Function: Improved score on LEFS to at least 45/80; reporting improved sleeping tolerance, able to stand/ walk greater than 15 minutes using upright posture with pain no worse than 4-5/10 for improved daily function. Independent with HEP for carryover of PT and ongoing self management.  Goal established 2/19/25 for neck/shoulder pain:  Pain: Will be independent with symptom management strategies and report neck and left shoulder pain pain no worse than 4/10 with daily activities.- not met  ROM: improved painfree neck ROM to at least 70 deg left rotation, 25 deg left lat flexion;  left shoulder painfree ROM to at least 150 deg flexion, 130 deg scaption for improved daily function.- progressing  Strength: UE strength at least 4+/5 throughout and independent with HEP for ongoing strengthening for improved performance with ADLs, IADLs, and recreational pursuits (quilting/ sewing).- not yet met  "

## 2025-04-09 ENCOUNTER — TREATMENT (OUTPATIENT)
Dept: PHYSICAL THERAPY | Facility: CLINIC | Age: 80
End: 2025-04-09
Payer: MEDICARE

## 2025-04-09 DIAGNOSIS — R26.81 GAIT INSTABILITY: ICD-10-CM

## 2025-04-09 DIAGNOSIS — G89.29 NECK PAIN, CHRONIC: ICD-10-CM

## 2025-04-09 DIAGNOSIS — R26.89 BALANCE PROBLEM: ICD-10-CM

## 2025-04-09 DIAGNOSIS — R53.1 GENERALIZED WEAKNESS: Primary | ICD-10-CM

## 2025-04-09 DIAGNOSIS — M54.2 NECK PAIN, CHRONIC: ICD-10-CM

## 2025-04-09 PROCEDURE — 97110 THERAPEUTIC EXERCISES: CPT | Mod: GP

## 2025-04-09 PROCEDURE — 97140 MANUAL THERAPY 1/> REGIONS: CPT | Mod: GP

## 2025-04-09 ASSESSMENT — PAIN SCALES - GENERAL
PAINLEVEL_OUTOF10: 0 - NO PAIN
PAINLEVEL_OUTOF10: 0 - NO PAIN
PAINLEVEL_OUTOF10: 8

## 2025-04-09 ASSESSMENT — PAIN SCALES - WONG BAKER: WONGBAKER_NUMERICALRESPONSE: HURTS EVEN MORE

## 2025-04-09 ASSESSMENT — PAIN - FUNCTIONAL ASSESSMENT: PAIN_FUNCTIONAL_ASSESSMENT: 0-10

## 2025-04-09 NOTE — PROGRESS NOTES
Physical Therapy    Physical Therapy Treatment/ Recheck    Patient Name: Jie Crocker  MRN: [id number]  Today's Date: 4/9/2025    Time Entry:   Time Calculation  Start Time: 1015  Stop Time: 1100  Time Calculation (min): 45 min     PT Therapeutic Procedures Time Entry  Manual Therapy Time Entry: 15  Therapeutic Exercise Time Entry: 30                   Assessment:   Jie has attended 8 PT visits since Feb 2, 2025 and had some issues that caused her to cancel some followups/ limit her attendance. Despite her ongoing complaints of neck pain, back pain, shoulder pain; she exhibits some improved left shoulder ROM, improved left UE strength. She has ongoing left sided neck soft and suboccipital soft tissue restrictions; ongoing lower quarter weakness; gait deficits. See objective data and goal status.  Will benefit from continued PT to work to achieve relief of her symptoms; learn additional self management strategies; develop HEP and encourage regular completion of HEP as well as to encourage consistent use of SPC for gait out of house to improved gait mechanics; decrease symptoms.     Plan:   Continue skilled outpatient PT 1-2 times/ week to address neck pain/ soft tissue restrictions; lower quarter / upper quarter strength; balance activities; core strength and HEP.  PT to include manual therapy, there ex, there activity, gait training, home program instruction.     Current Problem  1. Generalized weakness  Follow Up In Physical Therapy      2. Neck pain, chronic  Follow Up In Physical Therapy      3. Balance problem  Follow Up In Physical Therapy      4. Gait instability  Follow Up In Physical Therapy          General     General  General Comment: Still having bad neck pain with movement; and her left shoulder is still very painful with reaching behind her back.  Low back/ hip pain is about the same. There is so much going on, not feeling that there has been much relief yet. Has continued with her quilting  activities, and going to various trips/ shops.  Is concerned because she is more forgetful.    Subjective    Precautions  Precautions  STEADI Fall Risk Score (The score of 4 or more indicates an increased risk of falling): 6  Precautions Comment: fall risk  Pain  Pain Assessment  Pain Assessment: 0-10  0-10 (Numeric) Pain Score: 8 (with movement;  getting out of bed is hard)  Pain Type: Chronic pain  Pain Location: Neck  Pain Orientation: Left  Pain Radiating Towards: headache  Pain Frequency: Intermittent  Pain 2  Pain Score 2: 0 - No pain (10/10 with movement (trying to reach behind her back))  Pain Type 2: Chronic pain  Pain Location 2: Shoulder  Pain Orientation 2: Left  Pain 3  Pain Score 3: 0 - No pain  Kyle-Valle FACES Pain Rating 3: Hurts even more  Pain Type 3: Chronic pain  Pain Location 3: Back  Pain Orientation 3: Lower  Pain Radiating Towards 3: right hip    Objective      Ambulating with significant limp on right leg still- not using SPC or FWW as recommended when out of the house; uses it in the house sometimes.  Feels like a cane is a hindrance.  Feels it is too cumbersome to try and get the walker to her car.      to touch in left upper trap, suboccip mm.     Neck AROM:  Neck retraction:  5 reps; no change  Flexion: 55 deg; sore in left cervical paraspinals and upper trap  Ext: 50 deg; P! And pulling on left side  R cervical rotation: 60 deg, mild discomfort  L cervical rotation: 65 deg, mild discomfort    Denies radicular symptoms, and left UE pain not triggered by neck ROM    Shoulder AROM:  Flexion:  160 deg  left  (sore with lowering)/  150 deg right (prior RTC injury)  Scaption: 140 deg left / 120 deg left    IR:  to mid back on left; to lower thoracic on right  ER: can reach to base of head bilaterally      Upper Extremity Strength Right- prior RTC injury Left   Shoulder flexion 4 4+   Shoulder abduction 4 4+   Shoulder External Rotators 4- 4   Shoulder Internal Rotators 4+ 4+    Biceps 4+ 4+   Triceps 4+ 4+   Middle Trap 4 4   Lower Trap 4 4   Upper Trap 4 4   Lats 4 4                 Lower Extremity Strength Right Left   Hip Flexors 4+ 4+   Hip Abductors 4 4   Hip Extensors 4 4   Hip Adductors 4+ 4+   Quads 4+ 4+   Hamstrings 4+ 4+   Gastroc/Soleus 3+  DHR only 3+  DHR only   Ankle Dorsiflexors 5 5        30 sec chair rise  Painful/difficulty to do 1 rep without HHA        Abdominals 3+   Trunk extensors 4-;   fatigues with standing and sitting unsupported      No measured leg length difference (35 in each side); level iliac crest heights noted in standing.  Left knee valgus in standing.    Range of Motion:   Trunk AROM is WFL all planes; increased pain into right low back and buttock with flexion.      Palpation:   Tender over sciatic notch and greater troch on right.    Gait:   Trendelenburg gait on right during stance phase; and antalgic. Decreased stance time on right. Decreased step length. Unsteady with change of directions; slows down for caution.    TUG:  Trial 1:  9 sec Trial 2:  8 sec  Gait Speed:  not retested today    Balance:  Balance Comment: 4 stage balance:  2/4 (Unable to do tandem or SLS)    Activity tolerance:  Standing limited to 5-10 min.  Leans over sink and rests on forearms when doing dishes.    Stairs:  Stairs Comment: Step to pattern up/down; 1 rail and 1 hand on wall to steady.     Outcome Measures:  Other Measures  Lower Extremity Funtional Score (LEFS): 22    Treatments:  EXERCISES Date 4/2/25 Date: 4/9/25      Visit 7 Visit 8     Recheck  30 min- neck; back; UE; see obj data and goal status     Nustep L4 10 min             Manual Therapy to neck:  Seated STM to left upper trap, cervical paraspinals, TP release.  Supine cervical paraspinal, SOR, STM to suboccipitals.  Manual Cervical Distraction:  10 min 10 min  Supine STM to cervical paraspinals; suboccip release; manual cervical distraction  (Some relief with manual techniques today)     Neck retractions   "10 x                   Single knee to chest stretches       Shuttle  DLP 4B 30x      Shuttle SLP 3B 3x10 ea                    Qhip Flexion 20# 2x10 ea      Qhip Extension       Qhip Abduction 20# 2x10 ea                    Q Quad       Q Hamstring               T-band extension Blue 20x      T-band ski  Blue 20x      T-band rows Blue 20x      T-band IR/ER Red 2x10 ea                    NBOS on foam EO       Modified tandem balance              Rockerboard:  Heel/toe taps  A/P balance  Lat balance   X20  X 1 min              UT/levator stretches  (right only)              Posture/ Body Mechanics  Reviewed use of cane or FWW for improved gait mechanics     Morrison Balance Test  Next ----->     Gait Training with SPC  Next----->     HEP            OP EDUCATION:     Goal status as of 4/9/25:  Patient Goal: To have better balance, have decreased pain in mid back / right hip; neck and shoulders- not yet met  Physical Therapy Goals:  Pain: Will be independent with symptom management strategies and report mid to lower back and right hip pain no worse than 4-5/10 with sleeping, standing, walking.- not met  Strength: Improved lower quarter strength to 4+/5 throughout; and at least 4/5 in gastroc/ soleus complex; improved abdominal/ trunk strength and endurance for improved balance reactions and improved activity tolerance.- progressing  Balance: Test Morrison Balance and obtain \"low fall risk\" score for improved safety with daily activities/ walking;  4 Stage Balance test improved to at least 3/4; demonstrating improved ability to change directions; navigate obstacles without LOB.- not met  Gait: Will ambulate with SPC as needed for improved steadying when out of the house or walking longer distances.  Will demonstrate improved gait mechanics, minimal trendelenburg pattern, increased stance time on right and safety with changing directions for decreased fall risk when walking.- not met  Function: Improved score on LEFS to at least " 45/80; reporting improved sleeping tolerance, able to stand/ walk greater than 15 minutes using upright posture with pain no worse than 4-5/10 for improved daily function. Independent with HEP for carryover of PT and ongoing self management.- not met  Goal established 2/19/25 for neck/shoulder pain:  Status as of 4/9/25:   Pain: Will be independent with symptom management strategies and report neck and left shoulder pain pain no worse than 4/10 with daily activities.- not met  ROM: improved painfree neck ROM to at least 70 deg left rotation, 25 deg left lat flexion;  left shoulder painfree ROM to at least 150 deg flexion, 130 deg scaption for improved daily function.- progressing  Strength: UE strength at least 4+/5 throughout and independent with HEP for ongoing strengthening for improved performance with ADLs, IADLs, and recreational pursuits (quilting/ sewing).- progressing

## 2025-04-16 ENCOUNTER — TREATMENT (OUTPATIENT)
Dept: PHYSICAL THERAPY | Facility: CLINIC | Age: 80
End: 2025-04-16
Payer: MEDICARE

## 2025-04-16 DIAGNOSIS — R26.89 BALANCE PROBLEM: ICD-10-CM

## 2025-04-16 DIAGNOSIS — G89.29 NECK PAIN, CHRONIC: ICD-10-CM

## 2025-04-16 DIAGNOSIS — R26.81 GAIT INSTABILITY: ICD-10-CM

## 2025-04-16 DIAGNOSIS — R53.1 GENERALIZED WEAKNESS: Primary | ICD-10-CM

## 2025-04-16 DIAGNOSIS — M54.2 NECK PAIN, CHRONIC: ICD-10-CM

## 2025-04-16 PROCEDURE — 97110 THERAPEUTIC EXERCISES: CPT | Mod: GP

## 2025-04-16 PROCEDURE — 97140 MANUAL THERAPY 1/> REGIONS: CPT | Mod: GP

## 2025-04-16 ASSESSMENT — PAIN SCALES - GENERAL
PAINLEVEL_OUTOF10: 7
PAINLEVEL_OUTOF10: 0 - NO PAIN
PAINLEVEL_OUTOF10: 0 - NO PAIN

## 2025-04-16 ASSESSMENT — PAIN - FUNCTIONAL ASSESSMENT: PAIN_FUNCTIONAL_ASSESSMENT: 0-10

## 2025-04-16 NOTE — PROGRESS NOTES
Physical Therapy    Physical Therapy Treatment    Patient Name: Jie Crocker  MRN: [id number]  Today's Date: 4/16/2025    Time Entry:   Time Calculation  Start Time: 1015  Stop Time: 1102  Time Calculation (min): 47 min     PT Therapeutic Procedures Time Entry  Manual Therapy Time Entry: 10  Therapeutic Exercise Time Entry: 37                   Assessment:   No reports of increased pain with today's exercise. Decreased soft tissue restrictions in suboccipital region noted today. We reviewed use of neck roll in pillow for support when sleeping; and also reviewed taking posture breaks when sewing to do chin tucks, shoulder rolls; scap retractions to try to stay ahead of her neck and mid back pain.     Plan:   Continue to work on general LE strength. Postural mm/ trunk strength; balance. Trial SPC for gait for improved mechanics/ decreased glut med neg pattern. Continue to address neck pain/ soft tissue restrictions. Assess response to use of towel roll in pillow.     Current Problem  1. Generalized weakness  Follow Up In Physical Therapy      2. Neck pain, chronic  Follow Up In Physical Therapy      3. Balance problem  Follow Up In Physical Therapy      4. Gait instability  Follow Up In Physical Therapy          General     General  General Comment: Had a sharp pain in her neck during the night that happended when she was trying to change positions.  It lingers a while and goes into her left eye/ ear.  Sewing at her machine can bother her back; but not her neck so much.    Subjective    Precautions  Precautions  STEADI Fall Risk Score (The score of 4 or more indicates an increased risk of falling): 6  Precautions Comment: fall risk    Pain  Pain Assessment  Pain Assessment: 0-10  0-10 (Numeric) Pain Score: 7  Pain Type: Chronic pain  Pain Location: Neck  Pain Orientation: Left  Pain 2  Pain Score 2: 0 - No pain (can be severe with movement)  Pain Type 2: Chronic pain  Pain Location 2: Shoulder  Pain Orientation 2:  "Left  Pain 3  Pain Score 3: 0 - No pain (gets worse with standing/ moving can get up to 10/10)  Pain Type 3: Chronic pain  Pain Location 3: Back  Pain Orientation 3: Lower  Pain Radiating Towards 3: right hip    Sitting down relieves back pain when her pain gets worse (standing 5-10 min at most).    Objective   Good balance reactions on rockerboard A/P direction x 1 min  Maintained upright posture for all standing tband ex without needing seated rest break until the end.  Treatments:  EXERCISES Date 4/2/25 Date: 4/9/25 Date: 4/16/25     Visit 7 Visit 8 Visit 9    Recheck  30 min- neck; back; UE; see obj data and goal status     Nustep L4 10 min  L3 x 10           Manual Therapy to neck:  Seated STM to left upper trap, cervical paraspinals, TP release.  Supine cervical paraspinal, SOR, STM to suboccipitals.  Manual Cervical Distraction:  10 min 10 min  Supine STM to cervical paraspinals; suboccip release; manual cervical distraction  (Some relief with manual techniques today) 10 min  STM to cervical paraspinals; SOR, manual upper trap and lev scap stretching      Neck retractions  10 x 10 x     \"No money\"   Yellow: 2 x 10           Single knee to chest stretches       Shuttle  DLP 4B 30x  4B 3 x 10    Shuttle SLP 3B 3x10 ea  4B 3 x 10  3B 3 x 10                  Qhip Flexion 20# 2x10 ea  20# 2 x 10 R/L    Qhip Extension       Qhip Abduction 20# 2x10 ea  20# 2 x 10 R/L                  Q Quad       Q Hamstring               T-band extension Blue 20x  Blue x 20    T-band ski  Blue 20x  Blue x 20    T-band rows Blue 20x  Blue x 20    T-band IR/ER Red 2x10 ea                    NBOS on foam EO       Modified tandem balance              Rockerboard:  Heel/toe taps  A/P balance  Lat balance   X20  X 1 min     X 20  X 1 min, no HHA           UT/levator stretches  (right only)              Posture/ Body Mechanics  Reviewed use of cane or FWW for improved gait mechanics     Morrison Balance Test  Next -----> Next--->    Gait " "Training with SPC  Next-----> Next--->    HEP            OP EDUCATION:  Reviewed taking \"posture breaks\" when she is sewing, reading, etc.... (chin tucks, shoulder rolls, scap retractions).   No money ex   Educated on use of towel roll in pillow for neck support.      Goal status as of 4/9/25:  Patient Goal: To have better balance, have decreased pain in mid back / right hip; neck and shoulders- not yet met  Physical Therapy Goals:  Pain: Will be independent with symptom management strategies and report mid to lower back and right hip pain no worse than 4-5/10 with sleeping, standing, walking.- not met  Strength: Improved lower quarter strength to 4+/5 throughout; and at least 4/5 in gastroc/ soleus complex; improved abdominal/ trunk strength and endurance for improved balance reactions and improved activity tolerance.- progressing  Balance: Test Morrison Balance and obtain \"low fall risk\" score for improved safety with daily activities/ walking;  4 Stage Balance test improved to at least 3/4; demonstrating improved ability to change directions; navigate obstacles without LOB.- not met  Gait: Will ambulate with SPC as needed for improved steadying when out of the house or walking longer distances.  Will demonstrate improved gait mechanics, minimal trendelenburg pattern, increased stance time on right and safety with changing directions for decreased fall risk when walking.- not met  Function: Improved score on LEFS to at least 45/80; reporting improved sleeping tolerance, able to stand/ walk greater than 15 minutes using upright posture with pain no worse than 4-5/10 for improved daily function. Independent with HEP for carryover of PT and ongoing self management.- not met  Goal established 2/19/25 for neck/shoulder pain:  Status as of 4/9/25:   Pain: Will be independent with symptom management strategies and report neck and left shoulder pain pain no worse than 4/10 with daily activities.- not met  ROM: improved " painfree neck ROM to at least 70 deg left rotation, 25 deg left lat flexion;  left shoulder painfree ROM to at least 150 deg flexion, 130 deg scaption for improved daily function.- progressing  Strength: UE strength at least 4+/5 throughout and independent with HEP for ongoing strengthening for improved performance with ADLs, IADLs, and recreational pursuits (quilting/ sewing).- progressing

## 2025-04-21 ENCOUNTER — TREATMENT (OUTPATIENT)
Dept: PHYSICAL THERAPY | Facility: CLINIC | Age: 80
End: 2025-04-21
Payer: MEDICARE

## 2025-04-21 DIAGNOSIS — G89.29 NECK PAIN, CHRONIC: ICD-10-CM

## 2025-04-21 DIAGNOSIS — M54.2 NECK PAIN, CHRONIC: ICD-10-CM

## 2025-04-21 DIAGNOSIS — R53.1 GENERALIZED WEAKNESS: Primary | ICD-10-CM

## 2025-04-21 DIAGNOSIS — R26.89 BALANCE PROBLEM: ICD-10-CM

## 2025-04-21 DIAGNOSIS — R26.81 GAIT INSTABILITY: ICD-10-CM

## 2025-04-21 PROCEDURE — 97110 THERAPEUTIC EXERCISES: CPT | Mod: GP

## 2025-04-21 ASSESSMENT — PAIN SCALES - GENERAL
PAINLEVEL_OUTOF10: 6
PAINLEVEL_OUTOF10: 0 - NO PAIN

## 2025-04-21 ASSESSMENT — PAIN - FUNCTIONAL ASSESSMENT: PAIN_FUNCTIONAL_ASSESSMENT: 0-10

## 2025-04-21 NOTE — PROGRESS NOTES
Physical Therapy    Physical Therapy Treatment    Patient Name: Jie Crocker  MRN: [id number]  Today's Date: 4/21/2025    Time Entry:   Time Calculation  Start Time: 1402  Stop Time: 1445  Time Calculation (min): 43 min     PT Therapeutic Procedures Time Entry  Therapeutic Exercise Time Entry: 43                   Assessment:   Jie still reports knee, hip and bilateral feet pain with the feet pain becoming more limited according to Jie. She will discuss this with her Dr next week. We trialed a cane to see if she can improve her gait mechanics and decrease additional stresses on her knee and hip. She did not feel comfortable using a cane/ feels like it will trip her. We discussed use of FWW when out of the house/ having one to keep handy in her car in order to have less pain/ better gait mechanics to minimize joint stresses with walking.   Neck pain was a little less the past few days, but she can still get pain that refers to the left side of her head. She feels relief with manual techniques.     Plan:   We will continue to work to address neck pain/ soft tissue restrictions; postural mm strength and reinforce better body mechanics with her sewing/ recreational pursuits.  Continue with general LE strengthening and encourage use of appropriate assistive device to decrease knee/ hip pain and improved gait mechanics for additional pain control with daily activities. Try tennis ball suboccipital release.     Current Problem  1. Generalized weakness  Follow Up In Physical Therapy      2. Neck pain, chronic  Follow Up In Physical Therapy      3. Balance problem  Follow Up In Physical Therapy      4. Gait instability  Follow Up In Physical Therapy          General     General  General Comment: Both feet are hurting and really limiting her walking. Feet/ankles have been hurting a lot for a couple months.  Her right knee is sore.  Her neck is still sore, but her neck pain didn't wake her the past couple nights; her leg  "cramps woke her.  foot pain has been getting worse. Sees her Dr next week and will ask about any other testing.  She is taking Advil daily to help with pain. Has been using her FWW at home and walks better when using it; and has less pain. Is fearful of using cane. Also, doesn't have a walker to use out of the house.    Subjective    Precautions  Precautions  STEADI Fall Risk Score (The score of 4 or more indicates an increased risk of falling): 6  Precautions Comment: fall risk    Pain  Pain Assessment  Pain Assessment: 0-10  0-10 (Numeric) Pain Score: 6  Pain Type: Chronic pain  Pain Location: Neck  Pain Orientation: Left  Pain 3  Pain Score 3: 0 - No pain  Pain Type 3: Chronic pain  Pain Location 3: Back  Pain Orientation 3: Lower  Pain Radiating Towards 3: right hip    Objective   Can stand  upright for at least 5  minutes to complete standing resisted postural exercises.   Ambulating with limp, left hip trendelenberg more exaggerated today (due to bilateral foot/ ankle pain per patient).     Treatments:  EXERCISES Date 4/2/25 Date: 4/9/25 Date: 4/16/25 Date:     Visit 7 Visit 8 Visit 9 Visit 10    Recheck  30 min- neck; back; UE; see obj data and goal status     Nustep L4 10 min  L3 x 10 L3 x 7 min  L2 x 3 min          Manual Therapy to neck:  Seated STM to left upper trap, cervical paraspinals, TP release.  Supine cervical paraspinal, SOR, STM to suboccipitals.  Manual Cervical Distraction:  10 min 10 min  Supine STM to cervical paraspinals; suboccip release; manual cervical distraction  (Some relief with manual techniques today) 10 min  STM to cervical paraspinals; SOR, manual upper trap and lev scap stretching    10 min  STM to cervical paraspinals; SOR, manual cervical distraction   Neck retractions  10 x 10 x  10x   \"No money\"   Yellow: 2 x 10 Yellow 2 x 10          Single knee to chest stretches       Shuttle  DLP 4B 30x  4B 3 x 10 4B 3 x 15   Shuttle SLP 3B 3x10 ea  4B 3 x 10  3B 3 x 10 2B 3 x 10        " "         Qhip Flexion 20# 2x10 ea  20# 2 x 10 R/L 20# 2 x 10 R/L   Qhip Extension       Qhip Abduction 20# 2x10 ea  20# 2 x 10 R/L 20# 2 x 10 R/L                 Q Quad       Q Hamstring               T-band extension Blue 20x  Blue x 20 Blue x 20   T-band ski  Blue 20x  Blue x 20 Blue x 20   T-band rows Blue 20x  Blue x 20    T-band IR/ER Red 2x10 ea                    NBOS on foam EO       Modified tandem balance              Rockerboard:  Heel/toe taps  A/P balance     X20  X 1 min     X 20  X 1 min, no HHA   X20  X 1 min (2 x 20 sec without LOB     Calf stretches on incline board    3 x 20 sec   UT/levator stretches  (right only)              Posture/ Body Mechanics  Reviewed use of cane or FWW for improved gait mechanics  Reviewed use of FWW outside of home for long distances   Morrison Balance Test  Next -----> Next--->    Gait Training with SPC  Next-----> Next---> 2 x 20 ft, use on left and right- cueing, ed   HEP            OP EDUCATION:  Reviewed taking \"posture breaks\" when she is sewing, reading, etc.... (chin tucks, shoulder rolls, scap retractions).   No money ex   Educated on use of towel roll in pillow for neck support.      Goal status as of 4/9/25:  Patient Goal: To have better balance, have decreased pain in mid back / right hip; neck and shoulders- not yet met  Physical Therapy Goals:  Pain: Will be independent with symptom management strategies and report mid to lower back and right hip pain no worse than 4-5/10 with sleeping, standing, walking.- not met  Strength: Improved lower quarter strength to 4+/5 throughout; and at least 4/5 in gastroc/ soleus complex; improved abdominal/ trunk strength and endurance for improved balance reactions and improved activity tolerance.- progressing  Balance: Test Morrison Balance and obtain \"low fall risk\" score for improved safety with daily activities/ walking;  4 Stage Balance test improved to at least 3/4; demonstrating improved ability to change directions; " navigate obstacles without LOB.- not met  Gait: Will ambulate with SPC as needed for improved steadying when out of the house or walking longer distances.  Will demonstrate improved gait mechanics, minimal trendelenburg pattern, increased stance time on right and safety with changing directions for decreased fall risk when walking.- not met  Function: Improved score on LEFS to at least 45/80; reporting improved sleeping tolerance, able to stand/ walk greater than 15 minutes using upright posture with pain no worse than 4-5/10 for improved daily function. Independent with HEP for carryover of PT and ongoing self management.- not met  Goal established 2/19/25 for neck/shoulder pain:  Status as of 4/9/25:   Pain: Will be independent with symptom management strategies and report neck and left shoulder pain pain no worse than 4/10 with daily activities.- not met  ROM: improved painfree neck ROM to at least 70 deg left rotation, 25 deg left lat flexion;  left shoulder painfree ROM to at least 150 deg flexion, 130 deg scaption for improved daily function.- progressing  Strength: UE strength at least 4+/5 throughout and independent with HEP for ongoing strengthening for improved performance with ADLs, IADLs, and recreational pursuits (quilting/ sewing).- progressing

## 2025-04-21 NOTE — TELEPHONE ENCOUNTER
Noted and closing encounter   Patient has a mole that developed on her right ankle above her shin that is throbbing with swelling and causing balance issues and hard to walk. She has a medicare wellness scheduled 7/31 but wanted to let you know and if you feel she needs to be seen for this or if it can wait till her next appt.

## 2025-04-28 ENCOUNTER — APPOINTMENT (OUTPATIENT)
Dept: PHYSICAL THERAPY | Facility: CLINIC | Age: 80
End: 2025-04-28
Payer: MEDICARE

## 2025-04-28 ENCOUNTER — DOCUMENTATION (OUTPATIENT)
Dept: PHYSICAL THERAPY | Facility: CLINIC | Age: 80
End: 2025-04-28
Payer: MEDICARE

## 2025-04-28 DIAGNOSIS — R26.89 BALANCE PROBLEM: ICD-10-CM

## 2025-04-28 DIAGNOSIS — M54.2 NECK PAIN, CHRONIC: ICD-10-CM

## 2025-04-28 DIAGNOSIS — G89.29 NECK PAIN, CHRONIC: ICD-10-CM

## 2025-04-28 DIAGNOSIS — R53.1 GENERALIZED WEAKNESS: Primary | ICD-10-CM

## 2025-04-28 RX ORDER — CYANOCOBALAMIN 1000 UG/ML
1000 INJECTION, SOLUTION INTRAMUSCULAR; SUBCUTANEOUS ONCE
Status: COMPLETED | OUTPATIENT
Start: 2025-04-28 | End: 2025-04-30

## 2025-04-28 NOTE — PROGRESS NOTES
Physical Therapy                 Therapy Communication Note    Patient Name: Jie Crocker  MRN: [id number]  Department:   Room: Room/bed info not found  Today's Date: 4/28/2025     Discipline: Physical Therapy          Missed Time: Cancel    Comment: Patient called and cancelled due to not feeling well.

## 2025-04-30 ENCOUNTER — APPOINTMENT (OUTPATIENT)
Dept: PRIMARY CARE | Facility: CLINIC | Age: 80
End: 2025-04-30
Payer: MEDICARE

## 2025-04-30 VITALS
HEIGHT: 68 IN | WEIGHT: 200 LBS | HEART RATE: 66 BPM | SYSTOLIC BLOOD PRESSURE: 104 MMHG | BODY MASS INDEX: 30.31 KG/M2 | DIASTOLIC BLOOD PRESSURE: 60 MMHG

## 2025-04-30 DIAGNOSIS — M15.9 GENERALIZED OSTEOARTHRITIS OF MULTIPLE SITES: ICD-10-CM

## 2025-04-30 DIAGNOSIS — D89.89 CFIDS (CHRONIC FATIGUE AND IMMUNE DYSFUNCTION SYNDROME) (MULTI): Primary | ICD-10-CM

## 2025-04-30 DIAGNOSIS — E53.8 VITAMIN B12 DEFICIENCY: ICD-10-CM

## 2025-04-30 DIAGNOSIS — Z00.00 ROUTINE GENERAL MEDICAL EXAMINATION AT HEALTH CARE FACILITY: ICD-10-CM

## 2025-04-30 DIAGNOSIS — E55.9 VITAMIN D DEFICIENCY: ICD-10-CM

## 2025-04-30 DIAGNOSIS — J44.89 COPD (CHRONIC OBSTRUCTIVE PULMONARY DISEASE) WITH CHRONIC BRONCHITIS (MULTI): ICD-10-CM

## 2025-04-30 DIAGNOSIS — G93.32 CFIDS (CHRONIC FATIGUE AND IMMUNE DYSFUNCTION SYNDROME) (MULTI): Primary | ICD-10-CM

## 2025-04-30 DIAGNOSIS — E06.3 HYPOTHYROIDISM DUE TO HASHIMOTO'S THYROIDITIS: ICD-10-CM

## 2025-04-30 PROBLEM — R53.1 GENERALIZED WEAKNESS: Status: RESOLVED | Noted: 2025-02-05 | Resolved: 2025-04-30

## 2025-04-30 PROBLEM — M54.2 NECK PAIN, CHRONIC: Status: RESOLVED | Noted: 2025-02-05 | Resolved: 2025-04-30

## 2025-04-30 PROBLEM — G89.29 NECK PAIN, CHRONIC: Status: RESOLVED | Noted: 2025-02-05 | Resolved: 2025-04-30

## 2025-04-30 PROCEDURE — 1160F RVW MEDS BY RX/DR IN RCRD: CPT | Performed by: INTERNAL MEDICINE

## 2025-04-30 PROCEDURE — 1036F TOBACCO NON-USER: CPT | Performed by: INTERNAL MEDICINE

## 2025-04-30 PROCEDURE — 1159F MED LIST DOCD IN RCRD: CPT | Performed by: INTERNAL MEDICINE

## 2025-04-30 PROCEDURE — 1123F ACP DISCUSS/DSCN MKR DOCD: CPT | Performed by: INTERNAL MEDICINE

## 2025-04-30 PROCEDURE — 96372 THER/PROPH/DIAG INJ SC/IM: CPT | Performed by: INTERNAL MEDICINE

## 2025-04-30 PROCEDURE — 99213 OFFICE O/P EST LOW 20 MIN: CPT | Performed by: INTERNAL MEDICINE

## 2025-04-30 PROCEDURE — 1158F ADVNC CARE PLAN TLK DOCD: CPT | Performed by: INTERNAL MEDICINE

## 2025-04-30 RX ADMIN — CYANOCOBALAMIN 1000 MCG: 1000 INJECTION, SOLUTION INTRAMUSCULAR; SUBCUTANEOUS at 09:08

## 2025-04-30 ASSESSMENT — ANXIETY QUESTIONNAIRES
1. FEELING NERVOUS, ANXIOUS, OR ON EDGE: NOT AT ALL
5. BEING SO RESTLESS THAT IT IS HARD TO SIT STILL: NOT AT ALL
7. FEELING AFRAID AS IF SOMETHING AWFUL MIGHT HAPPEN: NOT AT ALL
GAD7 TOTAL SCORE: 0
6. BECOMING EASILY ANNOYED OR IRRITABLE: NOT AT ALL
IF YOU CHECKED OFF ANY PROBLEMS ON THIS QUESTIONNAIRE, HOW DIFFICULT HAVE THESE PROBLEMS MADE IT FOR YOU TO DO YOUR WORK, TAKE CARE OF THINGS AT HOME, OR GET ALONG WITH OTHER PEOPLE: NOT DIFFICULT AT ALL
3. WORRYING TOO MUCH ABOUT DIFFERENT THINGS: NOT AT ALL
4. TROUBLE RELAXING: NOT AT ALL
2. NOT BEING ABLE TO STOP OR CONTROL WORRYING: NOT AT ALL

## 2025-04-30 ASSESSMENT — ENCOUNTER SYMPTOMS
OCCASIONAL FEELINGS OF UNSTEADINESS: 1
LOSS OF SENSATION IN FEET: 0
DEPRESSION: 0

## 2025-04-30 NOTE — ASSESSMENT & PLAN NOTE
Clinically euthyroid on Synthroid d.a.w. 100 mcg daily reevaluate 6 months  Orders:    Follow Up In [facility] - PCP - Established    Referral to [facility]; Future    Tsh With Reflex To Free T4 If Abnormal; Future    CBC and Auto Differential; Future    Comprehensive metabolic panel; Future    Follow Up In [facility] - PCP - Medicare Annual; Future

## 2025-04-30 NOTE — ASSESSMENT & PLAN NOTE
Continue with use of Tylenol avoid NSAIDs if possible due to risk of bleeding and worsening renal insufficiency  Orders:    Follow Up In [facility] - PCP - Established

## 2025-04-30 NOTE — PROGRESS NOTES
"Subjective   Reason for Visit: Jie Crocker is an 80 y.o. female here for a Medicare Wellness visit.     Past Medical, Surgical, and Family History reviewed and updated in chart.    Reviewed all medications by prescribing practitioner or clinical pharmacist (such as prescriptions, OTCs, herbal therapies and supplements) and documented in the medical record.    HPI    Patient Care Team:  Magdaleno Nielson DO as PCP - General  Magdaleno Nielson DO as PCP - [facility] Attributed Provider     Review of Systems   All other systems reviewed and are negative.      Objective   Vitals:  /60   Pulse 66   Ht 1.727 m (5' 8\")   Wt 90.7 kg (200 lb)   BMI 30.41 kg/m²       Physical Exam  Vitals and nursing note reviewed.   Constitutional:       General: She is not in acute distress.     Appearance: Normal appearance. She is well-developed. She is obese. She is not toxic-appearing.   HENT:      Head: Normocephalic and atraumatic.      Right Ear: Tympanic membrane and external ear normal.      Left Ear: Tympanic membrane and external ear normal.      Nose: Nose normal.      Mouth/Throat:      Mouth: Mucous membranes are moist.      Pharynx: Oropharynx is clear. No oropharyngeal exudate or posterior oropharyngeal erythema.      Tonsils: No tonsillar exudate. 2+ on the right. 2+ on the left.   Eyes:      Extraocular Movements: Extraocular movements intact.      Conjunctiva/sclera: Conjunctivae normal.   Cardiovascular:      Rate and Rhythm: Normal rate and regular rhythm.      Pulses: Normal pulses.      Heart sounds: Normal heart sounds. No murmur heard.  Pulmonary:      Effort: Pulmonary effort is normal.      Breath sounds: Normal breath sounds.   Abdominal:      General: Abdomen is flat. Bowel sounds are normal.      Palpations: Abdomen is soft.   Musculoskeletal:      Cervical back: Neck supple.   Lymphadenopathy:      Cervical: No cervical adenopathy.   Skin:     General: Skin is warm and dry.      Findings: No " rash.   Neurological:      Mental Status: She is alert. Mental status is at baseline.   Psychiatric:         Mood and Affect: Mood normal.         Behavior: Behavior normal.         Thought Content: Thought content normal.         Judgment: Judgment normal.     Lifestyle Recommendations  I recommend a whole-food plant-based diet, an eating pattern that encourages the consumption of unrefined plant foods (such as fruits, vegetables, tubers, whole grains, legumes, nuts and seeds) and discourages meats, dairy products, eggs and processed foods.     The AHA/ACC recommends that the patient consume a dietary pattern that emphasizes intake of vegetables, fruits, and whole grains; includes low-fat dairy products, poultry, fish, legumes, non-tropical vegetable oils, and nuts; and limits intake of sodium, sweets, sugar-sweetened beverages, and red meats.  Adapt this dietary pattern to appropriate calorie requirements (a 500-750 kcal/day deficit to loose weight), personal and cultural food preferences, and nutrition therapy for other medical conditions (including diabetes).  Achieve this pattern by following plans such as the [place] Mediterranean, DASH dietary pattern, or AHA diet.     Engage in 2 hours and 30 minutes per week of moderate-intensity physical activity, or 1 hour and 15 minutes (75 minutes) per week of vigorous-intensity aerobic physical activity, or an equivalent combination of moderate and vigorous-intensity aerobic physical activity. Aerobic activity should be performed in episodes of at least 10 minutes preferably spread throughout the week.     Adhering to a heart healthy diet, regular exercise habits, avoidance of tobacco products, and maintenance of a healthy weight are crucial components of their heart disease risk reduction.    Patient Health Questionnaire-2 Score: 0 (4/30/2025  8:56 AM).  This indicates a positive screen but may not meet the criteria for a clinical depression diagnosis. Symptoms were  reviewed with Jie.  Follow-up within the next 3 months is recommended to re-assess symptoms and monitor mental health status.    Assessment & Plan  Vitamin B12 deficiency  Continue to receive IM injections on a monthly basis stable  Orders:    Referral to Clinical Pharmacy; Future    cyanocobalamin (Vitamin B-12) injection 1,000 mcg    Follow Up In [facility] - PCP - Established    COPD (chronic obstructive pulmonary disease) with chronic bronchitis (Multi)  Symptoms controlled on Symbicort 2 puffs twice a day and as needed use of albuterol continue montelukast  Orders:    Follow Up In [facility] - PCP - Established    Hypothyroidism due to Hashimoto's thyroiditis  Clinically euthyroid on Synthroid d.a.w. 100 mcg daily reevaluate 6 months  Orders:    Follow Up In [facility] - PCP - Established    Referral to [facility]; Future    Tsh With Reflex To Free T4 If Abnormal; Future    CBC and Auto Differential; Future    Comprehensive metabolic panel; Future    Follow Up In [facility] - PCP - Medicare Annual; Future    CFIDS (chronic fatigue and immune dysfunction syndrome) (Multi)  Continue to encourage regular physical therapy for gait and balance referral to [facility] for adjunctive medicine as she is a poor responder to medications continues to have difficulty with falls and balance and chronic pain related to severe osteoarthritis and fibromyalgia has not tolerated trials of duloxetine and gabapentin in the past look into other modalities that may be effective  Orders:    Follow Up In [facility] - PCP - Established    Referral to [facility]; Future    Tsh With Reflex To Free T4 If Abnormal; Future    CBC and Auto Differential; Future    Comprehensive metabolic panel; Future    Follow Up In [facility] - PCP - Medicare Annual; Future    Routine general medical examination at health care facility  Stable continue to  follow  Orders:    Follow Up In [facility] - PCP - Established    Generalized osteoarthritis of multiple sites  Continue with use of Tylenol avoid NSAIDs if possible due to risk of bleeding and worsening renal insufficiency  Orders:    Follow Up In [facility] - PCP - Established    Vitamin D deficiency  Continue with vitamin D supplementation to reduce risk of fracture and fall  Orders:    Follow Up In [facility] - PCP - Established    Vitamin D 25-Hydroxy,Total (for eval of Vitamin D levels); Future            Patient was identified as a fall risk. Risk prevention instructions provided.

## 2025-04-30 NOTE — ASSESSMENT & PLAN NOTE
Continue with vitamin D supplementation to reduce risk of fracture and fall  Orders:    Follow Up In [facility] - PCP - Established    Vitamin D 25-Hydroxy,Total (for eval of Vitamin D levels); Future

## 2025-04-30 NOTE — ASSESSMENT & PLAN NOTE
Continue to receive IM injections on a monthly basis stable  Orders:    Referral to Clinical Pharmacy; Future    cyanocobalamin (Vitamin B-12) injection 1,000 mcg    Follow Up In [facility] - PCP - Established

## 2025-04-30 NOTE — ASSESSMENT & PLAN NOTE
Symptoms controlled on Symbicort 2 puffs twice a day and as needed use of albuterol continue montelukast  Orders:    Follow Up In [facility] - PCP - Established

## 2025-04-30 NOTE — PROGRESS NOTES
"Subjective   Patient ID: Jie Crocker is a 80 y.o. female who presents for Follow-up.    HPI     Review of Systems    Objective   /60   Pulse 66   Ht 1.727 m (5' 8\")   Wt 90.7 kg (200 lb)   BMI 30.41 kg/m²     Physical Exam    Assessment/Plan          "

## 2025-04-30 NOTE — ASSESSMENT & PLAN NOTE
Continue to encourage regular physical therapy for gait and balance referral to [facility] for adjunctive medicine as she is a poor responder to medications continues to have difficulty with falls and balance and chronic pain related to severe osteoarthritis and fibromyalgia has not tolerated trials of duloxetine and gabapentin in the past look into other modalities that may be effective  Orders:    Follow Up In [facility] - PCP - Established    Referral to [facility]; Future    Tsh With Reflex To Free T4 If Abnormal; Future    CBC and Auto Differential; Future    Comprehensive metabolic panel; Future    Follow Up In [facility] - PCP - Medicare Annual; Future

## 2025-05-08 ENCOUNTER — TREATMENT (OUTPATIENT)
Dept: PHYSICAL THERAPY | Facility: CLINIC | Age: 80
End: 2025-05-08
Payer: MEDICARE

## 2025-05-08 DIAGNOSIS — M54.2 NECK PAIN, CHRONIC: ICD-10-CM

## 2025-05-08 DIAGNOSIS — R26.89 BALANCE PROBLEM: ICD-10-CM

## 2025-05-08 DIAGNOSIS — G89.29 NECK PAIN, CHRONIC: ICD-10-CM

## 2025-05-08 DIAGNOSIS — R26.81 GAIT INSTABILITY: ICD-10-CM

## 2025-05-08 DIAGNOSIS — R53.1 GENERALIZED WEAKNESS: Primary | ICD-10-CM

## 2025-05-08 PROCEDURE — 97110 THERAPEUTIC EXERCISES: CPT | Mod: GP

## 2025-05-08 ASSESSMENT — PAIN SCALES - GENERAL
PAINLEVEL_OUTOF10: 0 - NO PAIN
PAINLEVEL_OUTOF10: 0 - NO PAIN
PAINLEVEL_OUTOF10: 8

## 2025-05-08 ASSESSMENT — PAIN - FUNCTIONAL ASSESSMENT: PAIN_FUNCTIONAL_ASSESSMENT: 0-10

## 2025-05-08 NOTE — PROGRESS NOTES
Physical Therapy    Physical Therapy Treatment/ recheck    Patient Name: Jie Crocker  MRN: [id number]  Today's Date: 5/8/2025    Time Entry:   Time Calculation  Start Time: 1402  Stop Time: 1447  Time Calculation (min): 45 min     PT Therapeutic Procedures Time Entry  Therapeutic Exercise Time Entry: 45                   Assessment:    Jie is reporting continued neck, left shoulder, right knee, back pain which are all still easily exacerbated with her daily activities after 11 PT visits.  She reports limited compliance with her HEP; has not been using her walker when out of the house and this feeds into her poor gait mechanics and additional stresses on her right hip; low back; right knee.  She still exhibits radicular symptoms with neck ROM, soft tissue restrictions of cervical mm; weakness of hip and trunk mm.  She reports numerous other commitments during the next 3 weeks but would like to resume PT in June when she has more time to commit to coming.   If she can be more consistent with attending PT and compiance with use of a walker to reduce joint stresses / and normalize her gait mechanics and become more compliant with her HEP she may achieve more results in terms of consistent pain control, improved activity tolerance.  See objective data and goal status.     Plan:   We will hold PT x 3 weeks while Jie has multiple other commitments. We will resume PT 1-2 times/ week x 4 weeks in June and work to resume upper and lower quarter strengthening; focus on improved gait mechanics with /without assistive device; and continue to reinforce proper posture/ body mechanics and learn self management strategies. PT to include there ex, there act, manual therapy.     Current Problem  1. Generalized weakness  Follow Up In Physical Therapy      2. Neck pain, chronic  Follow Up In Physical Therapy      3. Balance problem  Follow Up In Physical Therapy      4. Gait instability  Follow Up In Physical Therapy           General     General  General Comment: Has been in such pain. Cancelled last week because of it.  Went to her ilt retreat and had such trouble sleeping due to her neck.  She saw the chiropractor today to work on her neck.  She wrenched her knee earlier today when getting out of bed and is now limping more.  She still has cracking in her neck as well.  Overall, she has not had much improvement in any of her pain.  Using her walker at home, but not when out of the house. Activity, movement aggravates all of her pain.    Subjective    Precautions  Precautions  STEADI Fall Risk Score (The score of 4 or more indicates an increased risk of falling): 6  Precautions Comment: fall risk     Pain  Pain Assessment  Pain Assessment: 0-10  0-10 (Numeric) Pain Score: 8 (was a 10 before going to chiropractor)  Pain Type: Chronic pain  Pain Location: Neck  Pain Orientation: Left  Pain 2  Pain Score 2: 0 - No pain (with use/ at night 10/10 with trying to turn over in bed)  Pain Type 2: Chronic pain  Pain Location 2: Shoulder  Pain Orientation 2: Left  Pain 3  Pain Score 3: 0 - No pain (worse with standing; pain across to hips 10/10 at worst)  Pain Type 3: Chronic pain (no pain at rest; worst with standing)  Pain Location 3: Back  Pain Orientation 3: Lower  Pain Radiating Towards 3: right    Objective   Ambulating with significant limp on right leg still- not using SPC or FWW as recommended when out of the house; uses it in the house sometimes.  Feels like a cane is a hindrance.  Feels it is too cumbersome to try and get the walker to her car.      to touch in left upper trap, suboccip mm.     Neck AROM:  Neck retraction:  5 reps; no change  Flexion: 65 deg; sore in left cervical paraspinals and upper trap  Ext: 55 deg; P! And pulling on left side, sends pain down left arm  R cervical rotation: 60 deg, mild discomfort  L cervical rotation: 60 deg, mild discomfort  R lat flexion 30 deg  L lat flexion  20 deg; pain on  left with return  Cervical extension triggers left upper arm pain today    Shoulder AROM:  Flexion:  160 deg  left  (sore with lowering)/  150 deg right (prior RTC injury)  Scaption: 150 deg left  IR:  to back pocket; and increases her left upper arm pain  ER: can reach to base of head bilaterally      Upper Extremity Strength Right- prior RTC injury Left   Shoulder flexion 4+ 4+   Shoulder abduction 4+ 4+   Shoulder External Rotators 4- 4   Shoulder Internal Rotators 4+ 4+   Biceps 4+ 4+   Triceps 4+ 4+   Middle Trap 4 4   Lower Trap 4 4   Upper Trap 4 4   Lats 4 4                 Lower Extremity Strength Right Left   Hip Flexors 4 4+   Hip Abductors 4 4   Hip Extensors 4 4   Hip Adductors 4+ 4+   Quads 4+ 4+   Hamstrings 4+ 4+   Gastroc/Soleus 3+  DHR only 3+  DHR only   Ankle Dorsiflexors 5 5        30 sec chair rise  Painful/difficulty to do 1 rep without HHA        Abdominals 3+   Trunk extensors 4-;   fatigues with standing and sitting unsupported      No measured leg length difference (35 in each side); level iliac crest heights noted in standing.  Left knee valgus in standing.    Palpation:   Tender over sciatic notch and greater troch on right.    Gait:   Trendelenburg gait on right during stance phase; and antalgic. Decreased stance time on right. Decreased step length. Unsteady with change of directions; slows down for caution.  Much improved gait with use of FWW; essentially no trendelenburg on right or limp.  Encouraged to use more consistently tiffany out of the house to reduce additional stresses on hip/ knee/ lumbar spine.     Limping more today due to her knee pain so TUG not retested.  Score below from last recheck.  TUG:  Trial 1:  9 sec Trial 2:  8 sec  Gait Speed:  not tested today due to increased knee pain.     Balance:  Balance Comment: 4 stage balance:  2/4 (Unable to do tandem or SLS)    Activity tolerance:  Standing limited to 5-10 min.  Leans over sink and rests on forearms when doing  "dishes.    Stairs:  Stairs Comment: Step to pattern up/down; 1 rail and 1 hand on wall to steady.        Outcome Measures:  Other Measures  Lower Extremity Funtional Score (LEFS): 22  Treatments:  EXERCISES Date 4/2/25 Date: 4/9/25 Date: 4/16/25 Date:  Date: 5/8/25    Visit 7 Visit 8 Visit 9 Visit 10  Visit: 11   Recheck  30 min- neck; back; UE; see obj data and goal status   35 min see obj data and goal status   Nustep L4 10 min  L3 x 10 L3 x 7 min  L2 x 3 min            Manual Therapy to neck:  Seated STM to left upper trap, cervical paraspinals, TP release.  Supine cervical paraspinal, SOR, STM to suboccipitals.  Manual Cervical Distraction:  10 min 10 min  Supine STM to cervical paraspinals; suboccip release; manual cervical distraction  (Some relief with manual techniques today) 10 min  STM to cervical paraspinals; SOR, manual upper trap and lev scap stretching    10 min  STM to cervical paraspinals; SOR, manual cervical distraction    Neck retractions  10 x 10 x  10x    \"No money\"   Yellow: 2 x 10 Yellow 2 x 10            Single knee to chest stretches        Shuttle  DLP 4B 30x  4B 3 x 10 4B 3 x 15    Shuttle SLP 3B 3x10 ea  4B 3 x 10  3B 3 x 10 2B 3 x 10                    Qhip Flexion 20# 2x10 ea  20# 2 x 10 R/L 20# 2 x 10 R/L    Qhip Extension        Qhip Abduction 20# 2x10 ea  20# 2 x 10 R/L 20# 2 x 10 R/L                    Q Quad        Q Hamstring                 T-band extension Blue 20x  Blue x 20 Blue x 20    T-band ski  Blue 20x  Blue x 20 Blue x 20    T-band rows Blue 20x  Blue x 20     T-band IR/ER Red 2x10 ea                       NBOS on foam EO        Modified tandem balance                Rockerboard:  Heel/toe taps  A/P balance     X20  X 1 min     X 20  X 1 min, no HHA   X20  X 1 min (2 x 20 sec without LOB      Calf stretches on incline board    3 x 20 sec    UT/levator stretches  (right only)                Posture/ Body Mechanics  Reviewed use of cane or FWW for improved gait mechanics  " "Reviewed use of FWW outside of home for long distances    Morrison Balance Test  Next -----> Next--->  Unable to perform due to increased knee pain today   Gait Training with SPC  Next-----> Next---> 2 x 20 ft, use on left and right- cueing, ed Gait training with FWW x 5 min   HEP             OP EDUCATION:  Reviewed taking \"posture breaks\" when she is sewing, reading, etc.... (chin tucks, shoulder rolls, scap retractions).   No money ex   Educated on use of towel roll in pillow for neck support.      Goal status as of 5/8/25:  Patient Goal: To have better balance, have decreased pain in mid back / right hip; neck and shoulders- not yet met  Physical Therapy Goals:  Pain: Will be independent with symptom management strategies and report mid to lower back and right hip pain no worse than 4-5/10 with sleeping, standing, walking.- not met  Strength: Improved lower quarter strength to 4+/5 throughout; and at least 4/5 in gastroc/ soleus complex; improved abdominal/ trunk strength and endurance for improved balance reactions and improved activity tolerance.- progressing  Balance: Test Morrison Balance and obtain \"low fall risk\" score for improved safety with daily activities/ walking;  4 Stage Balance test improved to at least 3/4; demonstrating improved ability to change directions; navigate obstacles without LOB.- not met  Gait: Will ambulate with SPC as needed for improved steadying when out of the house or walking longer distances.  Will demonstrate improved gait mechanics, minimal trendelenburg pattern, increased stance time on right and safety with changing directions for decreased fall risk when walking.- not met  Function: Improved score on LEFS to at least 45/80; reporting improved sleeping tolerance, able to stand/ walk greater than 15 minutes using upright posture with pain no worse than 4-5/10 for improved daily function. Independent with HEP for carryover of PT and ongoing self management.- not met  Goal established " 2/19/25 for neck/shoulder pain:  Status as of 5/8/25:   Pain: Will be independent with symptom management strategies and report neck and left shoulder pain pain no worse than 4/10 with daily activities.- not met  ROM: improved painfree neck ROM to at least 70 deg left rotation, 25 deg left lat flexion;  left shoulder painfree ROM to at least 150 deg flexion, 130 deg scaption for improved daily function.- progressing  Strength: UE strength at least 4+/5 throughout and independent with HEP for ongoing strengthening for improved performance with ADLs, IADLs, and recreational pursuits (quilting/ sewing).- progressing

## 2025-05-20 DIAGNOSIS — E06.3 HYPOTHYROIDISM DUE TO HASHIMOTO'S THYROIDITIS: ICD-10-CM

## 2025-05-20 RX ORDER — LEVOTHYROXINE SODIUM 100 UG/1
100 TABLET ORAL DAILY
Qty: 30 TABLET | Refills: 11 | Status: SHIPPED | OUTPATIENT
Start: 2025-05-20 | End: 2026-05-20

## 2025-05-30 ENCOUNTER — APPOINTMENT (OUTPATIENT)
Dept: PHARMACY | Facility: HOSPITAL | Age: 80
End: 2025-05-30
Payer: MEDICARE

## 2025-05-30 DIAGNOSIS — E53.8 VITAMIN B12 DEFICIENCY: ICD-10-CM

## 2025-05-30 DIAGNOSIS — J44.89 COPD (CHRONIC OBSTRUCTIVE PULMONARY DISEASE) WITH CHRONIC BRONCHITIS (MULTI): Primary | ICD-10-CM

## 2025-05-30 NOTE — PROGRESS NOTES
Clinical Pharmacy Appointment    Patient ID: Jie Crocker is a 80 y.o. female who presents for No chief complaint on file..    Pt is here for First appointment.     Referring Provider: Magdaleno Nielson DO  PCP: Magdaleno Nielson DO  Last visit with PCP: 4/30/2025   Next visit with PCP: 10/30/2025    Subjective   Medication Reconciliation reviewed and completed    Drug Interactions  No relevant drug interactions were noted.    Medication System Management  Patient's preferred pharmacy:     [id number] [address] [address]  [address] [address]  Phone: [phone] Fax: [phone]      Adherence/Organization: unable to stay on inhaler maintenance dose, only use as needed  Affordability/Accessibility: cost concern with inhaler      Interval History      HPI  PULMONARY ASSESSMENT  Patient has been diagnosed with: COPD, chronic bronchitis  Does patient see pulmonology: No  has not had PFT's completed in last 2 years    Current Regimen  Symbicort - not used every day   Ventolin HFA - Albuterol prn   Has not filled for 2 years    Clarifications to above regimen: patient is not using inhalers due to cost concerns   Adverse Effects: n/a   Appropriate technique? Will assess at future visit    Historical Treatment  Symbicort     Symptom Management  Current symptoms: dyspnea, cough, and wheezing. Experienced falls, due to balance issues. Problems with steroid and muscle cramps  Triggers: mold, and moisture   Alleviating factors: inhaler  Exercise capacity: not much, falls risk, limited d/t balance concerns  How often do you use your rescue inhaler? Only when needed, within the last month  mMRC score: 2    Exacerbation Hx  When was your last hospitalization for an exacerbation? A few years ago  When was the last time you were treated with antibiotics and/or steroids? A few years ago  Total number of hospitalizations d/t exacerbation: n/a    Immunization  "History:  Influenza: recommended  PCV13: Date [2009]  PPSV23: Date [2009]  COVID: Date [2022]  RSV: recommended    Smoking History  She has never smoked.    Objective   Allergies[1]  Social History     Social History Narrative    Not on file      Medication Review  Current Outpatient Medications   Medication Instructions    albuterol 90 mcg/actuation inhaler 2 puffs, Every 6 hours PRN    budesonide-formoteroL (Symbicort) 160-4.5 mcg/actuation inhaler 2 puffs, inhalation, 2 times daily    cholecalciferol (VITAMIN D-3) 50 mcg, Daily    cyanocobalamin (VITAMIN B-12) 1,000 mcg, Every 30 days    cyanocobalamin-cobamamide (B-12 Plus) 5,000-100 mcg tablet, sublingual 1 tablet, Daily    estradiol (ESTRACE) 2 g, Once Weekly    Lactobacillus acidophilus (Acidophilus) capsule 1 capsule, Daily    levothyroxine (SYNTHROID) 100 mcg, oral, Daily, Take on an empty stomach at the same time each day, either 30 to 60 minutes prior to breakfast    magnesium chloride 71.5 mg tablet,delayed release (DR/EC) 2 tablets, Daily    montelukast (Singulair) 10 mg tablet TAKE 1 TABLET(10 MG) BY MOUTH DAILY AT BEDTIME      Vitals  BP Readings from Last 2 Encounters:   04/30/25 104/60   12/09/24 145/90     BMI Readings from Last 1 Encounters:   04/30/25 30.41 kg/m²      Labs  A1C  No results found for: \"HGBA1C\"  BMP  Lab Results   Component Value Date    CALCIUM 9.7 10/21/2024     10/21/2024    K 4.6 10/21/2024    CO2 27 10/21/2024     10/21/2024    BUN 12 10/21/2024    CREATININE 0.81 10/21/2024    EGFR 75 10/21/2024     LFTs  Lab Results   Component Value Date    ALT 20 10/21/2024    AST 21 10/21/2024    ALKPHOS 93 10/21/2024    BILITOT 0.4 10/21/2024     FLP  Lab Results   Component Value Date    TRIG 252 (H) 10/21/2024    CHOL 198 10/21/2024    LDLCALC 112 (H) 10/21/2024    HDL 35.2 10/21/2024     Urine Microalbumin  No results found for: \"MICROALBCREA\"  Weight Management  Wt Readings from Last 3 Encounters:   04/30/25 90.7 kg (200 " lb)   02/03/25 86.2 kg (190 lb)   12/09/24 87.1 kg (192 lb)      There is no height or weight on file to calculate BMI.     Assessment/Plan   Problem List Items Addressed This Visit       COPD (chronic obstructive pulmonary disease) with chronic bronchitis (Multi) - Primary    Relevant Orders    Referral to Clinical Pharmacy    Vitamin B12 deficiency      Patient Assistance Program (PAP)    Application for program to be submitted for the following medications: Anoro, Ventolin    Prescription Insurance:  Yes   Paid Test Claim:  Yes   County  Permanent Address:  [place]   Members of Household:  1   Files Taxes:  Yes     Patient will be dropping off income paperwork at PCP office     Patient verbally reports monthly or yearly income which is less than 400% federal poverty level    Patient aware this process may take up to 6 weeks.     If approved medication must be filled through [facility] and MEDICATION WILL BE MAILED TO PATIENT.          Clinical Pharmacist follow-up: 6/27/2025 4:00 PM , Telehealth visit  Patient is not followed in CCM. (If yes, track minutes under compass grant at each visit)    Continue all meds under the continuation of care with the referring provider and clinical pharmacy team.    Sindhu Hernandez, PharmD   Meds Clinical Pharmacist  Phone: [phone]     Verbal consent to manage patient's drug therapy was obtained from the patient. They were informed they may decline to participate or withdraw from participation in pharmacy services at any time.         [1]   Allergies  Allergen Reactions    Mold Shortness of breath    Corticosteroids (Glucocorticoids) Other     Leg cramps    Darvocet A500 [Propoxyphene N-Acetaminophen] Blurred vision    Ketamine Hallucinations    Pollen Extracts Unknown    Propoxyphene-Acetaminophen Other    Adhesive Tape-Silicones Rash    Piroxicam Other     burning to eyes and swelling

## 2025-06-16 ENCOUNTER — OFFICE VISIT (OUTPATIENT)
Dept: PRIMARY CARE | Facility: CLINIC | Age: 80
End: 2025-06-16
Payer: MEDICARE

## 2025-06-16 ENCOUNTER — PATIENT MESSAGE (OUTPATIENT)
Dept: PRIMARY CARE | Facility: CLINIC | Age: 80
End: 2025-06-16

## 2025-06-16 VITALS
WEIGHT: 197 LBS | BODY MASS INDEX: 29.86 KG/M2 | OXYGEN SATURATION: 97 % | SYSTOLIC BLOOD PRESSURE: 112 MMHG | DIASTOLIC BLOOD PRESSURE: 70 MMHG | HEIGHT: 68 IN | HEART RATE: 73 BPM

## 2025-06-16 DIAGNOSIS — J30.89 PERENNIAL ALLERGIC RHINITIS WITH SEASONAL VARIATION: ICD-10-CM

## 2025-06-16 DIAGNOSIS — J30.2 PERENNIAL ALLERGIC RHINITIS WITH SEASONAL VARIATION: ICD-10-CM

## 2025-06-16 DIAGNOSIS — E06.3 HYPOTHYROIDISM DUE TO HASHIMOTO'S THYROIDITIS: ICD-10-CM

## 2025-06-16 DIAGNOSIS — J44.1 COPD WITH ACUTE EXACERBATION (MULTI): Primary | ICD-10-CM

## 2025-06-16 DIAGNOSIS — E53.8 VITAMIN B12 DEFICIENCY: ICD-10-CM

## 2025-06-16 RX ORDER — UMECLIDINIUM BROMIDE AND VILANTEROL TRIFENATATE 62.5; 25 UG/1; UG/1
1 POWDER RESPIRATORY (INHALATION) DAILY
Qty: 180 EACH | Refills: 3 | Status: SHIPPED | OUTPATIENT
Start: 2025-06-16 | End: 2025-06-16 | Stop reason: SDUPTHER

## 2025-06-16 RX ORDER — IPRATROPIUM BROMIDE AND ALBUTEROL SULFATE 2.5; .5 MG/3ML; MG/3ML
3 SOLUTION RESPIRATORY (INHALATION) ONCE
Status: COMPLETED | OUTPATIENT
Start: 2025-06-16 | End: 2025-06-16

## 2025-06-16 RX ORDER — CYANOCOBALAMIN 1000 UG/ML
1000 INJECTION, SOLUTION INTRAMUSCULAR; SUBCUTANEOUS ONCE
Status: COMPLETED | OUTPATIENT
Start: 2025-06-16 | End: 2025-06-16

## 2025-06-16 RX ORDER — LEVOTHYROXINE SODIUM 100 UG/1
100 TABLET ORAL DAILY
Qty: 90 TABLET | Refills: 3 | Status: SHIPPED | OUTPATIENT
Start: 2025-06-16 | End: 2026-06-16

## 2025-06-16 RX ORDER — MONTELUKAST SODIUM 10 MG/1
10 TABLET ORAL NIGHTLY
Qty: 90 TABLET | Refills: 3 | Status: SHIPPED | OUTPATIENT
Start: 2025-06-16 | End: 2026-06-16

## 2025-06-16 RX ADMIN — CYANOCOBALAMIN 1000 MCG: 1000 INJECTION, SOLUTION INTRAMUSCULAR; SUBCUTANEOUS at 11:39

## 2025-06-16 RX ADMIN — IPRATROPIUM BROMIDE AND ALBUTEROL SULFATE 3 ML: 2.5; .5 SOLUTION RESPIRATORY (INHALATION) at 11:39

## 2025-06-16 ASSESSMENT — ENCOUNTER SYMPTOMS
SHORTNESS OF BREATH: 1
RHINORRHEA: 1
COUGH: 1
WHEEZING: 1
CHEST TIGHTNESS: 1
DYSPNEA ON EXERTION: 1

## 2025-06-16 ASSESSMENT — COPD QUESTIONNAIRES: COPD: 1

## 2025-06-16 NOTE — PROGRESS NOTES
"Subjective   Patient ID: Jie Crocker is a 80 y.o. female who presents for Asthma (Wheezing, SOB, coughing. ).    HPI     Review of Systems    Objective   /70   Pulse 73   Ht 1.727 m (5' 8\")   Wt 89.4 kg (197 lb)   SpO2 97%   BMI 29.95 kg/m²     Physical Exam    Assessment/Plan          "

## 2025-06-16 NOTE — ASSESSMENT & PLAN NOTE
Patient using old or  medication for acute exacerbation also had sensitivity to corticosteroids not being able to use for acute exacerbations pharmacy will switch patient to Anoro Ellipta 1 elation daily will give duo nebulizer treatment in office today no indication for acute antibiotic therapy given no evidence of productive sputum we will continue to follow closely however optimize allergy  Orders:    umeclidinium-vilanteroL (Anoro Ellipta) 62.5-25 mcg/actuation blister with inhaler device; Inhale 1 puff once daily.

## 2025-06-16 NOTE — ASSESSMENT & PLAN NOTE
Patient with combined upper airway cough syndrome optimize therapy of allergies with use of Singulair and antihistamines could probably benefit from immunotherapy but expensive for patient at this time and time-consuming would continue to encourage use of nasal preparations such as Astelin and fluticasone as well as reduced areas with exposure to dust pollen and smoke in the atmosphere patient does not have air conditioning at home relies on use of fans making it difficult reevaluate with follow-up appointment  Orders:    montelukast (Singulair) 10 mg tablet; Take 1 tablet (10 mg) by mouth once daily at bedtime.

## 2025-06-16 NOTE — PROGRESS NOTES
"Subjective   Reason for Visit: Jie Crocker is an 80 y.o. female here for a acute COPD exacerbation visit.          Reviewed all medications by prescribing practitioner or clinical pharmacist (such as prescriptions, OTCs, herbal therapies and supplements) and documented in the medical record.    Asthma  She complains of cough, shortness of breath and wheezing. The current episode started in the past 7 days. The problem occurs constantly. The problem has been gradually worsening. The cough is non-productive and harsh. Associated symptoms include dyspnea on exertion, postnasal drip, rhinorrhea and sneezing. Her symptoms are aggravated by pollen, change in weather and exposure to smoke. Her symptoms are alleviated by leukotriene antagonist, beta-agonist, steroid inhaler and rest. She reports minimal improvement on treatment. Her symptoms are not alleviated by oral steroids. Risk factors for lung disease include occupational exposure. Her past medical history is significant for asthma, bronchitis, COPD and pneumonia.       Patient Care Team:  Magdaleno Nielson DO as PCP - General  Magdaleno Nielson DO as PCP - [facility] Attributed Provider     Review of Systems   HENT:  Positive for postnasal drip, rhinorrhea and sneezing.    Respiratory:  Positive for cough, chest tightness, shortness of breath and wheezing.    Cardiovascular:  Positive for dyspnea on exertion.       Objective   Vitals:  /70   Pulse 73   Ht 1.727 m (5' 8\")   Wt 89.4 kg (197 lb)   SpO2 97%   BMI 29.95 kg/m²       Physical Exam  Vitals and nursing note reviewed.   Constitutional:       General: She is not in acute distress.     Appearance: Normal appearance. She is well-developed. She is not toxic-appearing.   HENT:      Head: Normocephalic and atraumatic.      Right Ear: Tympanic membrane and external ear normal.      Left Ear: Tympanic membrane and external ear normal.      Nose: Nose normal.      Mouth/Throat:      Mouth: Mucous " membranes are moist.      Pharynx: Oropharynx is clear. No oropharyngeal exudate or posterior oropharyngeal erythema.      Tonsils: No tonsillar exudate. 2+ on the right. 2+ on the left.   Eyes:      Extraocular Movements: Extraocular movements intact.      Conjunctiva/sclera: Conjunctivae normal.   Cardiovascular:      Rate and Rhythm: Normal rate and regular rhythm.      Pulses: Normal pulses.      Heart sounds: Normal heart sounds. No murmur heard.  Pulmonary:      Effort: Pulmonary effort is normal.      Breath sounds: Wheezing present.   Abdominal:      General: Abdomen is flat. Bowel sounds are normal.      Palpations: Abdomen is soft.   Musculoskeletal:      Cervical back: Neck supple.   Lymphadenopathy:      Cervical: No cervical adenopathy.   Skin:     General: Skin is warm and dry.      Findings: No rash.   Neurological:      Mental Status: She is alert. Mental status is at baseline.   Psychiatric:         Mood and Affect: Mood normal.         Behavior: Behavior normal.         Thought Content: Thought content normal.         Judgment: Judgment normal.         Assessment & Plan  COPD with acute exacerbation (Multi)  Patient using old or  medication for acute exacerbation also had sensitivity to corticosteroids not being able to use for acute exacerbations pharmacy will switch patient to Anoro Ellipta 1 elation daily will give duo nebulizer treatment in office today no indication for acute antibiotic therapy given no evidence of productive sputum we will continue to follow closely however optimize allergy  Orders:    umeclidinium-vilanteroL (Anoro Ellipta) 62.5-25 mcg/actuation blister with inhaler device; Inhale 1 puff once daily.    Perennial allergic rhinitis with seasonal variation  Patient with combined upper airway cough syndrome optimize therapy of allergies with use of Singulair and antihistamines could probably benefit from immunotherapy but expensive for patient at this time and  time-consuming would continue to encourage use of nasal preparations such as Astelin and fluticasone as well as reduced areas with exposure to dust pollen and smoke in the atmosphere patient does not have air conditioning at home relies on use of fans making it difficult reevaluate with follow-up appointment  Orders:    montelukast (Singulair) 10 mg tablet; Take 1 tablet (10 mg) by mouth once daily at bedtime.

## 2025-06-19 PROCEDURE — RXMED WILLOW AMBULATORY MEDICATION CHARGE

## 2025-06-23 ENCOUNTER — PHARMACY VISIT (OUTPATIENT)
Dept: PHARMACY | Facility: CLINIC | Age: 80
End: 2025-06-23
Payer: COMMERCIAL

## 2025-06-24 NOTE — PROGRESS NOTES
Clinical Pharmacy Appointment    Patient ID: Jie Crocker is a 80 y.o. female who presents for COPD    Pt is here for Follow Up appointment.     Referring Provider: Magdaleno Nielson DO  PCP: Magdaleno Nielson DO  Last visit with PCP: 4/30/2025   Next visit with PCP: 10/30/2025     Patient Assistance for Anoro approved through 6/19/2025. Will have to be renewed prior to that date to prevent lapse in coverage. Medication(s) will be received at no cost to patient from [facility].      Subjective   Medication Reconciliation reviewed and completed    Drug Interactions  No relevant drug interactions were noted.    Medication System Management  Patient's preferred pharmacy:     [id number] - [place], [place] - [address] [address]  [address] [address]  Phone: [phone] Fax: [phone]    [facility]  [address], [address]  [facility] [address]  Phone: [phone] Fax: [phone]      Adherence/Organization: unable to stay on inhaler maintenance dose, only use as needed  Affordability/Accessibility: cost concern with inhaler      Interval History      HPI  PULMONARY ASSESSMENT  Patient has been diagnosed with: COPD, chronic bronchitis  Does patient see pulmonology: No  has not had PFT's completed in last 2 years    Current Regimen  Symbicort - not used every day   Ventolin HFA - Albuterol prn   Has not filled for 2 years    Clarifications to above regimen: patient is not using inhalers due to cost concerns   Adverse Effects: n/a   Appropriate technique? Will assess at future visit    Historical Treatment  Symbicort     Symptom Management  Current symptoms: dyspnea, cough, and wheezing. Experienced falls, due to balance issues. Problems with steroid and muscle cramps  Triggers: mold, and moisture   Alleviating factors: inhaler  Exercise capacity: not much, falls risk, limited d/t balance concerns  How often do you use your rescue inhaler? Only  "when needed, within the last month  mMRC score: 2    Exacerbation Hx  When was your last hospitalization for an exacerbation? A few years ago  When was the last time you were treated with antibiotics and/or steroids? A few years ago  Total number of hospitalizations d/t exacerbation: n/a    Immunization History:  Influenza: recommended  PCV13: Date [2009]  PPSV23: Date [2009]  COVID: Date [2022]  RSV: recommended    Smoking History  She has never smoked.    Objective   Allergies[1]  Social History     Social History Narrative    Not on file      Medication Review  Current Outpatient Medications   Medication Instructions    albuterol 90 mcg/actuation inhaler 2 puffs, inhalation, Every 6 hours PRN    cholecalciferol (VITAMIN D-3) 50 mcg, Daily    cyanocobalamin (VITAMIN B-12) 1,000 mcg, Every 30 days    cyanocobalamin-cobamamide (B-12 Plus) 5,000-100 mcg tablet, sublingual 1 tablet, Daily    estradiol (ESTRACE) 2 g, Once Weekly    Lactobacillus acidophilus (Acidophilus) capsule 1 capsule, Daily    levothyroxine (SYNTHROID) 100 mcg, oral, Daily, Take on an empty stomach at the same time each day, either 30 to 60 minutes prior to breakfast    magnesium chloride 71.5 mg tablet,delayed release (DR/EC) 2 tablets, Daily    montelukast (SINGULAIR) 10 mg, oral, Nightly    umeclidinium-vilanteroL (Anoro Ellipta) 62.5-25 mcg/actuation blister with inhaler device 1 puff, inhalation, Daily      Vitals  BP Readings from Last 2 Encounters:   06/16/25 112/70   04/30/25 104/60     BMI Readings from Last 1 Encounters:   06/16/25 29.95 kg/m²      Labs  A1C  No results found for: \"HGBA1C\"  BMP  Lab Results   Component Value Date    CALCIUM 9.7 10/21/2024     10/21/2024    K 4.6 10/21/2024    CO2 27 10/21/2024     10/21/2024    BUN 12 10/21/2024    CREATININE 0.81 10/21/2024    EGFR 75 10/21/2024     LFTs  Lab Results   Component Value Date    ALT 20 10/21/2024    AST 21 10/21/2024    ALKPHOS 93 10/21/2024    BILITOT 0.4 " "10/21/2024     FLP  Lab Results   Component Value Date    TRIG 252 (H) 10/21/2024    CHOL 198 10/21/2024    LDLCALC 112 (H) 10/21/2024    HDL 35.2 10/21/2024     Urine Microalbumin  No results found for: \"MICROALBCREA\"  Weight Management  Wt Readings from Last 3 Encounters:   06/16/25 89.4 kg (197 lb)   04/30/25 90.7 kg (200 lb)   02/03/25 86.2 kg (190 lb)      There is no height or weight on file to calculate BMI.     PATIENT EDUCATION/DISCUSSION:  - Counseled patient on MOA, expectations, side effects, duration of therapy, contraindications, administration, and monitoring parameters  - Answered all patient questions and concerns  - Patient has not yet started Anoro, received 90 ds from  pharmacy      Assessment/Plan   Problem List Items Addressed This Visit       COPD (chronic obstructive pulmonary disease) with chronic bronchitis (Multi) - Primary    Relevant Orders    Referral to Clinical Pharmacy    COPD with acute exacerbation (Multi)    Patient with COPD that is needs further observation. Based on CAT score, exacerbation history, and eosinophil count, patient is GOLD Group B and LABA+LAMA therapy is recommended.     START  Anoro 62.5-25 mcg - 1 puff daily         Relevant Orders    Referral to Clinical Pharmacy           Clinical Pharmacist follow-up: 6/27/2025 4:00 PM , Telehealth visit  Patient is not followed in [facility].     Continue all meds under the continuation of care with the referring provider and clinical pharmacy team.    Sindhu Hernandez PharmD   Meds Clinical Pharmacist  Phone: [phone]     Verbal consent to manage patient's drug therapy was obtained from the patient. They were informed they may decline to participate or withdraw from participation in pharmacy services at any time.         [1]   Allergies  Allergen Reactions    Mold Shortness of breath    Corticosteroids (Glucocorticoids) Other     Leg cramps    Darvocet A500 [Propoxyphene N-Acetaminophen] Blurred vision    Ketamine " Hallucinations    Pollen Extracts Unknown    Propoxyphene-Acetaminophen Other    Adhesive Tape-Silicones Rash    Piroxicam Other     burning to eyes and swelling

## 2025-06-27 ENCOUNTER — APPOINTMENT (OUTPATIENT)
Dept: PHARMACY | Facility: HOSPITAL | Age: 80
End: 2025-06-27
Payer: MEDICARE

## 2025-06-27 DIAGNOSIS — J44.1 COPD WITH ACUTE EXACERBATION (MULTI): ICD-10-CM

## 2025-06-27 DIAGNOSIS — J44.89 COPD (CHRONIC OBSTRUCTIVE PULMONARY DISEASE) WITH CHRONIC BRONCHITIS (MULTI): Primary | ICD-10-CM

## 2025-06-27 NOTE — ASSESSMENT & PLAN NOTE
Patient with COPD that is needs further observation. Based on CAT score, exacerbation history, and eosinophil count, patient is GOLD Group B and LABA+LAMA therapy is recommended.     START  Anoro 62.5-25 mcg - 1 puff daily

## 2025-06-30 PROCEDURE — RXMED WILLOW AMBULATORY MEDICATION CHARGE

## 2025-07-02 ENCOUNTER — PHARMACY VISIT (OUTPATIENT)
Dept: PHARMACY | Facility: CLINIC | Age: 80
End: 2025-07-02
Payer: COMMERCIAL

## 2025-08-19 ENCOUNTER — APPOINTMENT (OUTPATIENT)
Dept: PHARMACY | Facility: HOSPITAL | Age: 80
End: 2025-08-19
Payer: MEDICARE

## 2025-08-19 DIAGNOSIS — J44.1 COPD WITH ACUTE EXACERBATION (MULTI): ICD-10-CM

## 2025-08-19 DIAGNOSIS — J44.89 COPD (CHRONIC OBSTRUCTIVE PULMONARY DISEASE) WITH CHRONIC BRONCHITIS (MULTI): ICD-10-CM

## 2025-08-21 ENCOUNTER — HOSPITAL ENCOUNTER (OUTPATIENT)
Dept: RADIOLOGY | Facility: HOSPITAL | Age: 80
Discharge: HOME | End: 2025-08-21
Payer: MEDICARE

## 2025-08-21 ENCOUNTER — CONSULT (OUTPATIENT)
Facility: HOSPITAL | Age: 80
End: 2025-08-21
Payer: MEDICARE

## 2025-08-21 VITALS — HEART RATE: 100 BPM | DIASTOLIC BLOOD PRESSURE: 95 MMHG | RESPIRATION RATE: 20 BRPM | SYSTOLIC BLOOD PRESSURE: 178 MMHG

## 2025-08-21 DIAGNOSIS — M47.814 SPONDYLOSIS OF THORACIC SPINE: ICD-10-CM

## 2025-08-21 DIAGNOSIS — Z74.09 IMPAIRED FUNCTIONAL MOBILITY, BALANCE, GAIT, AND ENDURANCE: Primary | ICD-10-CM

## 2025-08-21 DIAGNOSIS — M47.812 CERVICAL SPONDYLOSIS: ICD-10-CM

## 2025-08-21 DIAGNOSIS — Z74.09 IMPAIRED FUNCTIONAL MOBILITY, BALANCE, GAIT, AND ENDURANCE: ICD-10-CM

## 2025-08-21 PROCEDURE — 72072 X-RAY EXAM THORAC SPINE 3VWS: CPT | Performed by: RADIOLOGY

## 2025-08-21 PROCEDURE — 99204 OFFICE O/P NEW MOD 45 MIN: CPT | Performed by: PHYSICAL MEDICINE & REHABILITATION

## 2025-08-21 PROCEDURE — G2211 COMPLEX E/M VISIT ADD ON: HCPCS | Performed by: PHYSICAL MEDICINE & REHABILITATION

## 2025-08-21 PROCEDURE — 72072 X-RAY EXAM THORAC SPINE 3VWS: CPT

## 2025-08-21 PROCEDURE — 99214 OFFICE O/P EST MOD 30 MIN: CPT | Performed by: PHYSICAL MEDICINE & REHABILITATION

## 2025-08-21 ASSESSMENT — PAIN SCALES - GENERAL: PAINLEVEL_OUTOF10: 8

## 2025-10-30 ENCOUNTER — APPOINTMENT (OUTPATIENT)
Dept: PRIMARY CARE | Facility: CLINIC | Age: 80
End: 2025-10-30
Payer: MEDICARE

## 2025-12-09 ENCOUNTER — APPOINTMENT (OUTPATIENT)
Dept: PHARMACY | Facility: HOSPITAL | Age: 80
End: 2025-12-09
Payer: MEDICARE

## (undated) DEVICE — GLOVE, PROTEXIS PI CLASSIC, SZ-7.5, PF, LF

## (undated) DEVICE — INSTRUMENT, DISSECTING, ENDOSCOPIC, PEANUT, 5 MM, STERILE

## (undated) DEVICE — PAD, SANITARY, OBSTETRICAL, W/ADHSV STRIP,11 IN,LF

## (undated) DEVICE — IRRIGATION SET, CYSTOSCOPY, REGULATING CLAMP, STRAIGHT, 81 IN

## (undated) DEVICE — TOWEL PACK, STERILE, 4/PACK, BLUE

## (undated) DEVICE — ADHESIVE, SKIN, LIQUIBAND EXCEED

## (undated) DEVICE — LIGASURE, SEALER/DIVIDER MARYLAND JAW, 5MM

## (undated) DEVICE — PREP, SCRUB, SKIN, FOAM, HIBICLENS, 4 OZ

## (undated) DEVICE — DRAPE PACK, LAVH, W/ATTACHED LEGGINGS, W/POUCH, 100 X 114 IN, LF, STERILE

## (undated) DEVICE — HEMOSTAT, ARISTA, ABSORBABLE, 3 GRAMS

## (undated) DEVICE — TROCAR, OPTICAL BLADELESS 5MM X 100 W/ADVANCED FIXATION

## (undated) DEVICE — LUBRICANT, JELLY, STERILE, 4OZ, FLIP TOP

## (undated) DEVICE — SUTURE, CTD, VICRYL 0, CT-2, 27 IN, VIOLET BRAIDED

## (undated) DEVICE — SUTURE, PDS II, 0, 27 IN, CT-2, VIOLET

## (undated) DEVICE — SYRINGE, 60 CC, IRRIGATION, BULB, CONTRO-BULB, PAPER POUCH

## (undated) DEVICE — DRAPE PACK, LITHOTOMY, CUSTOM, UHC

## (undated) DEVICE — SOLUTION, IRRIGATION, SODIUM CHLORIDE 0.9%, 1000 ML, POUR BOTTLE

## (undated) DEVICE — SYRINGE, 50 CC, IRRIGATION, CATHETER TIP, DISPOSABLE, STERILE, LF

## (undated) DEVICE — SOLUTION, IRRIGATION, USP, STERILE WATER, UROLOGICAL, 3000 ML, BAG

## (undated) DEVICE — SCOPE WARMER, LAPAROSCOPE, BAG ONLY, LF

## (undated) DEVICE — SYRINGE, 50 CC, LUER LOCK

## (undated) DEVICE — SCISSOR TIP, ENDOCUT, LAPAROSCOPIC

## (undated) DEVICE — SOLUTION, IRRIGATION, USP, SODIUM CHLORIDE 0.9%, 3000 ML

## (undated) DEVICE — APPLICATOR, ARISTA, FLEXITIP, XL, LF

## (undated) DEVICE — ASSEMBLY, STRYKER FLOW 2, SUCTION IRRIGATOR, WITH TIP

## (undated) DEVICE — ACCESS SYS, KII SHIELDED BLADED, Z-THREAD, 12X100CM

## (undated) DEVICE — COVER HANDLE LIGHT, STERIS, BLUE, STERILE

## (undated) DEVICE — TROCAR SYSTEM, BALLOON, KII GELPORT, 12 X 100MM

## (undated) DEVICE — SOLUTION, IRRIGATION, USP, SODIUM CHLORIDE 0.9%, .9 NACL, 1000 ML, BAG

## (undated) DEVICE — SYRINGE, 10 CC, LUER LOCK

## (undated) DEVICE — SUTURE, VICRYL, 0, 36 IN, CT-1, UNDYED

## (undated) DEVICE — SPONGE, GAUZE, XRAY DECT, 16 PLY, 4 X 4, W/MASTER WDMT,STERILE

## (undated) DEVICE — ELECTRODE, OPTI2 LAPAROSCOPIC SPATULA, CURVED

## (undated) DEVICE — BAG, DECANTER

## (undated) DEVICE — TIP, SUCTION, YANKAUER, BULB, OPEN TIP, W/O CONTROL VENT, LF, CLEAR

## (undated) DEVICE — DRAPE, INSTRUMENT, W/POUCH, STERI DRAPE, 9 5/8 X 18 LONG

## (undated) DEVICE — APPLICATOR, CHLORAPREP, W/ORANGE TINT, 26ML